# Patient Record
Sex: FEMALE | Race: WHITE | NOT HISPANIC OR LATINO | Employment: OTHER | RURAL
[De-identification: names, ages, dates, MRNs, and addresses within clinical notes are randomized per-mention and may not be internally consistent; named-entity substitution may affect disease eponyms.]

---

## 2017-03-21 ENCOUNTER — OFFICE VISIT (OUTPATIENT)
Dept: FAMILY MEDICINE CLINIC | Facility: CLINIC | Age: 77
End: 2017-03-21

## 2017-03-21 VITALS
TEMPERATURE: 97.9 F | WEIGHT: 185.2 LBS | DIASTOLIC BLOOD PRESSURE: 78 MMHG | BODY MASS INDEX: 31.62 KG/M2 | SYSTOLIC BLOOD PRESSURE: 130 MMHG | HEIGHT: 64 IN

## 2017-03-21 DIAGNOSIS — Z12.12 SCREENING FOR MALIGNANT NEOPLASM OF THE RECTUM: ICD-10-CM

## 2017-03-21 DIAGNOSIS — Z00.00 ANNUAL PHYSICAL EXAM: ICD-10-CM

## 2017-03-21 DIAGNOSIS — Z12.31 ENCOUNTER FOR SCREENING MAMMOGRAM FOR MALIGNANT NEOPLASM OF BREAST: ICD-10-CM

## 2017-03-21 DIAGNOSIS — Z12.4 SCREENING FOR MALIGNANT NEOPLASM OF CERVIX: ICD-10-CM

## 2017-03-21 DIAGNOSIS — R53.83 FATIGUE, UNSPECIFIED TYPE: ICD-10-CM

## 2017-03-21 DIAGNOSIS — E55.9 UNSPECIFIED VITAMIN D DEFICIENCY: ICD-10-CM

## 2017-03-21 DIAGNOSIS — E78.2 MIXED HYPERLIPIDEMIA: Primary | ICD-10-CM

## 2017-03-21 PROBLEM — K21.9 GERD (GASTROESOPHAGEAL REFLUX DISEASE): Status: ACTIVE | Noted: 2017-03-21

## 2017-03-21 PROBLEM — F41.9 ANXIETY: Status: ACTIVE | Noted: 2017-03-21

## 2017-03-21 PROBLEM — I10 ESSENTIAL HYPERTENSION: Status: ACTIVE | Noted: 2017-03-21

## 2017-03-21 LAB
BILIRUB BLD-MCNC: NEGATIVE MG/DL
CLARITY, POC: CLEAR
COLOR UR: YELLOW
EXPIRATION DATE 2: NORMAL
EXPIRATION DATE 3: NORMAL
EXPIRATION DATE: NORMAL
GASTROCULT GAST QL: NORMAL
GLUCOSE UR STRIP-MCNC: NEGATIVE MG/DL
HEMOCCULT SP2 STL QL: NORMAL
HEMOCCULT SP3 STL QL: NORMAL
KETONES UR QL: NEGATIVE
LEUKOCYTE EST, POC: ABNORMAL
Lab: NORMAL
NITRITE UR-MCNC: NEGATIVE MG/ML
PH UR: 7 [PH] (ref 5–8)
PROT UR STRIP-MCNC: NEGATIVE MG/DL
RBC # UR STRIP: NEGATIVE /UL
SP GR UR: 1.02 (ref 1–1.03)
UROBILINOGEN UR QL: NORMAL

## 2017-03-21 PROCEDURE — 81003 URINALYSIS AUTO W/O SCOPE: CPT | Performed by: FAMILY MEDICINE

## 2017-03-21 PROCEDURE — 82270 OCCULT BLOOD FECES: CPT | Performed by: FAMILY MEDICINE

## 2017-03-21 PROCEDURE — G0439 PPPS, SUBSEQ VISIT: HCPCS | Performed by: FAMILY MEDICINE

## 2017-03-21 RX ORDER — MULTIPLE VITAMINS W/ MINERALS TAB 9MG-400MCG
1 TAB ORAL DAILY
COMMUNITY

## 2017-03-21 RX ORDER — CITALOPRAM 20 MG/1
20 TABLET ORAL DAILY
COMMUNITY
Start: 2017-01-10 | End: 2017-03-22 | Stop reason: SDUPTHER

## 2017-03-21 RX ORDER — INDAPAMIDE 2.5 MG/1
2.5 TABLET, FILM COATED ORAL
COMMUNITY
Start: 2016-12-29 | End: 2017-03-22 | Stop reason: SDUPTHER

## 2017-03-21 RX ORDER — LISINOPRIL 10 MG/1
TABLET ORAL
COMMUNITY
Start: 2017-01-10 | End: 2017-03-22 | Stop reason: SDUPTHER

## 2017-03-21 RX ORDER — SIMVASTATIN 20 MG
20 TABLET ORAL NIGHTLY
COMMUNITY
End: 2017-03-22 | Stop reason: SDUPTHER

## 2017-03-21 RX ORDER — RANITIDINE 300 MG/1
TABLET ORAL
COMMUNITY
Start: 2016-12-29 | End: 2017-03-22 | Stop reason: SDUPTHER

## 2017-03-21 RX ORDER — ASPIRIN 81 MG/1
81 TABLET ORAL DAILY
COMMUNITY
End: 2018-09-25

## 2017-03-21 NOTE — PATIENT INSTRUCTIONS
Exercising to Stay Healthy  Exercising regularly is important. It has many health benefits, such as:  · Improving your overall fitness, flexibility, and endurance.  · Increasing your bone density.  · Helping with weight control.  · Decreasing your body fat.  · Increasing your muscle strength.  · Reducing stress and tension.  · Improving your overall health.  In order to become healthy and stay healthy, it is recommended that you do moderate-intensity and vigorous-intensity exercise. You can tell that you are exercising at a moderate intensity if you have a higher heart rate and faster breathing, but you are still able to hold a conversation. You can tell that you are exercising at a vigorous intensity if you are breathing much harder and faster and cannot hold a conversation while exercising.  HOW OFTEN SHOULD I EXERCISE?  Choose an activity that you enjoy and set realistic goals. Your health care provider can help you to make an activity plan that works for you. Exercise regularly as directed by your health care provider. This may include:   · Doing resistance training twice each week, such as:    Push-ups.    Sit-ups.    Lifting weights.    Using resistance bands.  · Doing a given intensity of exercise for a given amount of time. Choose from these options:    150 minutes of moderate-intensity exercise every week.    75 minutes of vigorous-intensity exercise every week.    A mix of moderate-intensity and vigorous-intensity exercise every week.  Children, pregnant women, people who are out of shape, people who are overweight, and older adults may need to consult a health care provider for individual recommendations. If you have any sort of medical condition, be sure to consult your health care provider before starting a new exercise program.   WHAT ARE SOME EXERCISE IDEAS?  Some moderate-intensity exercise ideas include:   · Walking at a rate of 1 mile in 15  minutes.  · Biking.  · Hiking.  · Golfing.  · Dancing.  Some vigorous-intensity exercise ideas include:   · Walking at a rate of at least 4.5 miles per hour.  · Jogging or running at a rate of 5 miles per hour.  · Biking at a rate of at least 10 miles per hour.  · Lap swimming.  · Roller-skating or in-line skating.  · Cross-country skiing.  · Vigorous competitive sports, such as football, basketball, and soccer.  · Jumping rope.  · Aerobic dancing.  WHAT ARE SOME EVERYDAY ACTIVITIES THAT CAN HELP ME TO GET EXERCISE?  · Yard work, such as:    Pushing a .    Raking and bagging leaves.  · Washing and waxing your car.  · Pushing a stroller.  · Shoveling snow.  · Gardening.  · Washing windows or floors.  HOW CAN I BE MORE ACTIVE IN MY DAY-TO-DAY ACTIVITIES?  · Use the stairs instead of the elevator.  · Take a walk during your lunch break.  · If you drive, park your car farther away from work or school.  · If you take public transportation, get off one stop early and walk the rest of the way.  · Make all of your phone calls while standing up and walking around.  · Get up, stretch, and walk around every 30 minutes throughout the day.  WHAT GUIDELINES SHOULD I FOLLOW WHILE EXERCISING?  · Do not exercise so much that you hurt yourself, feel dizzy, or get very short of breath.  · Consult your health care provider before starting a new exercise program.  · Wear comfortable clothes and shoes with good support.  · Drink plenty of water while you exercise to prevent dehydration or heat stroke. Body water is lost during exercise and must be replaced.  · Work out until you breathe faster and your heart beats faster.     This information is not intended to replace advice given to you by your health care provider. Make sure you discuss any questions you have with your health care provider.     Document Released: 01/20/2012 Document Revised: 01/08/2016 Document Reviewed: 05/21/2015  [employer] Patient Education  © [employer]    Medicare Wellness  Personal Prevention Plan of Service     Date of Office Visit:  2017  Encounter Provider:  Titi Cotton MD  Place of Service:  [facility]  Patient Name: Hailey Dickerson  :  1940    As part of the Medicare Wellness portion of your visit today, we are providing you with this personalized preventive plan of services (PPPS). This plan is based upon recommendations of the [place] Preventive Services Task Force (USPSTF) and the Advisory Committee on Immunization Practices (ACIP).    This lists the preventive care services that should be considered, and provides dates of when you are due. Items listed as completed are up-to-date and do not require any further intervention.    Health Maintenance   Topic Date Due   • TDAP/TD VACCINES (1 - Tdap) 1958   • PNEUMOCOCCAL VACCINES (65+ LOW/MEDIUM RISK) (1 of 2 - PCV13) 2004   • LIPID PANEL  2017   • ZOSTER VACCINE  2017   • MEDICARE ANNUAL WELLNESS  2017   • INFLUENZA VACCINE  Completed

## 2017-03-21 NOTE — PROGRESS NOTES
QUICK REFERENCE INFORMATION:  The ABCs of the Annual Wellness Visit    Subsequent Medicare Wellness Visit    HEALTH RISK ASSESSMENT    1940    Recent Hospitalizations:  No recent hospitalization(s)..        Current Medical Providers:  Patient Care Team:  Titi Cotton MD as PCP - General (Family Medicine)        Smoking Status:  History   Smoking Status   • Never Smoker   Smokeless Tobacco   • Never Used       Alcohol Consumption:  History   Alcohol Use No       Depression Screen:   PHQ-9 Depression Screening 3/21/2017   Little interest or pleasure in doing things 0   Feeling down, depressed, or hopeless 0   Trouble falling or staying asleep, or sleeping too much 0   Feeling tired or having little energy 0   Poor appetite or overeating 0   Feeling bad about yourself - or that you are a failure or have let yourself or your family down 0   Trouble concentrating on things, such as reading the newspaper or watching television 0   Moving or speaking so slowly that other people could have noticed. Or the opposite - being so fidgety or restless that you have been moving around a lot more than usual 0   Thoughts that you would be better off dead, or of hurting yourself in some way 0   PHQ-9 Total Score 0   If you checked off any problems, how difficult have these problems made it for you to do your work, take care of things at home, or get along with other people? Not difficult at all       Health Habits and Functional and Cognitive Screening:  Functional & Cognitive Status 3/21/2017   Do you have difficulty preparing food and eating? No   Do you have difficulty bathing yourself? No   Do you have difficulty getting dressed? No   Do you have difficulty using the toilet? No   Do you have difficulty moving around from place to place? No   In the past year have you fallen or experienced a near fall? No   Do you need help using the phone?  No   Are you deaf or do you have serious difficulty hearing?  No   Do you  "need help with transportation? No   Do you need help shopping? No   Do you need help preparing meals?  No   Do you need help with housework?  No   Do you need help with laundry? No   Do you need help taking your medications? No   Do you need help managing money? No   Do you have difficulty concentrating, remembering or making decisions? No   -- The Timed Up and Go (TUG) Test (CDC Version)                  - Testing directions adhered to:                                  1) Patients wears regular footwear and may use walking aid(s) if    needed.                                  2) Patient to sit back in standard arm chair and identify a line 10 feet    away from patient on floor.                                  3) Test time begins at \"Go\" and stops when patient reseated in arm    chair.                    - Instructions read aloud (and demonstrated as applicable) to patient:                                      When I say \"Go,\" I want you to:                                    1) Stand up from the chair.                                  2) Walk to the line on the floor (10 feet away) at your normal pace.                                  3) Turn.                                  4) Walk back to the chair at your normal pace.                                  5) Sit down again.                    - Result:                     - Observations during test:Slow tentative gait                Does the patient have evidence of cognitive impairment? No    Asprin use counseling:yes      Recent Lab Results:  CMP:     Lipid Panel:     LDL:     HbA1c:     Urine Microalbumin:     Visual Acuity:  No exam data present    Age-appropriate Screening Schedule:  Refer to the list below for future screening recommendations based on patient's age, sex and/or medical conditions. Orders for these recommended tests are listed in the plan section. The patient has been provided with a written plan.    Health Maintenance   Topic Date Due   • " TDAP/TD VACCINES (1 - Tdap) 06/17/1958   • PNEUMOCOCCAL VACCINES (65+ LOW/MEDIUM RISK) (1 of 2 - PCV13) 06/17/2004   • LIPID PANEL  03/21/2017   • ZOSTER VACCINE  03/21/2017   • INFLUENZA VACCINE  Completed        Subjective   History of Present Illness    Hailey Dickerson is a 76 y.o. female who presents for an Subsequent Wellness Visit.    The following portions of the patient's history were reviewed and updated as appropriate: allergies, current medications, past family history, past medical history, past surgical history and problem list.    No outpatient prescriptions prior to visit.     No facility-administered medications prior to visit.        Patient Active Problem List   Diagnosis   • Essential hypertension   • Anxiety   • GERD (gastroesophageal reflux disease)       Advanced Care Planning:  has an advanced directive - a copy HAS NOT been provided    Identification of Risk Factors:  Risk factors include: weight , unhealthy diet and cardiovascular risk.    Review of Systems   Constitutional: Negative.    HENT: Negative.    Eyes: Negative.    Respiratory: Negative.    Cardiovascular:        [facility] cardiologist regularly   Gastrointestinal:        Up-to-date on colonoscopy   Endocrine: Negative.    Genitourinary:        [facility] urologist regularly   Musculoskeletal: Negative.    Allergic/Immunologic: Negative.    Neurological: Negative.    Hematological: Negative.    Psychiatric/Behavioral: Negative.        Compared to one year ago, the patient feels her physical health is the same.  Compared to one year ago, the patient feels her mental health is the same.    Objective     Physical Exam   Constitutional: She is oriented to person, place, and time. She appears well-developed and well-nourished.   HENT:   Head: Normocephalic.   Right Ear: External ear normal.   Left Ear: External ear normal.   Mouth/Throat: Oropharynx is clear and moist.   Eyes: Conjunctivae and EOM are normal. Pupils are equal, round, and reactive to  "light.   Neck: Normal range of motion. No thyromegaly present.   Cardiovascular: Normal rate and regular rhythm.    No murmur heard.  Pulmonary/Chest: Effort normal and breath sounds normal. She has no wheezes. She has no rales.   Abdominal: Soft. Bowel sounds are normal.   No pulsatile masses   Genitourinary: Vagina normal and uterus normal.   Genitourinary Comments: Breasts no masses noted-pelvic atrophic vaginitis Pap smear taken bimanual normal with no adnexal masses   Musculoskeletal: Normal range of motion.   Lymphadenopathy:     She has no cervical adenopathy.   Neurological: She is alert and oriented to person, place, and time.   Skin: Skin is warm and dry.   Psychiatric: She has a normal mood and affect. Her behavior is normal. Judgment and thought content normal.   Vitals reviewed.      Vitals:    03/21/17 1020   BP: 130/78   Temp: 97.9 °F (36.6 °C)   Weight: 185 lb 3.2 oz (84 kg)   Height: 64\" (162.6 cm)   PainSc: 0-No pain       Body mass index is 31.79 kg/(m^2).  Discussed the patient's BMI with her. The BMI is above average; no BMI management plan is appropriate..    Assessment/Plan   Patient Self-Management and Personalized Health Advice  The patient has been provided with information about: diet, exercise, weight management, prevention of cardiac or vascular disease and the relationship between weight and GERD and preventive services including:   · Colorectal cancer screening, fecal occult blood test, Diabetes screening, see lab orders, Exercise counseling provided, Screening Pap smear and pelvic exam .    Visit Diagnoses:    ICD-10-CM ICD-9-CM   1. Mixed hyperlipidemia E78.2 272.2   2. Fatigue, unspecified type R53.83 780.79   3. Unspecified vitamin D deficiency E55.9 268.9   4. Screening for malignant neoplasm of the rectum Z12.12 V76.41   5. Encounter for screening mammogram for malignant neoplasm of breast Z12.31 V76.12       Orders Placed This Encounter   Procedures   • Mammo Screening Bilateral " With CAD     Standing Status:   Future     Standing Expiration Date:   3/21/2018     Order Specific Question:   Reason for Exam:     Answer:   screening   • TSH   • T4, free   • Comprehensive Metabolic Panel   • Lipid Panel   • Vitamin D 25 Hydroxy   • POC Occult Blood X 3, Stool       Outpatient Encounter Prescriptions as of 3/21/2017   Medication Sig Dispense Refill   • aspirin 81 MG EC tablet Take 81 mg by mouth Daily.     • citalopram (CeleXA) 20 MG tablet 20 mg Daily.     • Cranberry 125 MG tablet Take  by mouth Daily.     • indapamide (LOZOL) 2.5 MG tablet 2.5 mg. Take by mouth on Monday, Wednesday, and Friday     • lisinopril (PRINIVIL,ZESTRIL) 10 MG tablet      • Multiple Vitamins-Minerals (MULTIVITAMIN WITH MINERALS) tablet tablet Take 1 tablet by mouth Daily.     • raNITIdine (ZANTAC) 300 MG tablet      • simvastatin (ZOCOR) 20 MG tablet Take 20 mg by mouth Every Night.       No facility-administered encounter medications on file as of 3/21/2017.        Reviewed use of high risk medication in the elderly: yes  Reviewed for potential of harmful drug interactions in the elderly: yes    Follow Up:  Return in 6 months (on 9/21/2017).     An After Visit Summary and PPPS with all of these plans were given to the patient.       plan continue blood pressure cholesterol control and exercise pattern-brain health discussed

## 2017-03-22 LAB
25(OH)D3+25(OH)D2 SERPL-MCNC: 42.3 NG/ML (ref 30–100)
ALBUMIN SERPL-MCNC: 4.4 G/DL (ref 3.5–5)
ALBUMIN/GLOB SERPL: 1.6 G/DL (ref 1.1–2.5)
ALP SERPL-CCNC: 67 U/L (ref 24–120)
ALT SERPL-CCNC: 24 U/L (ref 0–54)
AST SERPL-CCNC: 24 U/L (ref 7–45)
BASOPHILS # BLD AUTO: 0.04 10*3/MM3 (ref 0–0.2)
BASOPHILS NFR BLD AUTO: 0.6 % (ref 0–2)
BILIRUB SERPL-MCNC: 0.5 MG/DL (ref 0.1–1)
BUN SERPL-MCNC: 19 MG/DL (ref 5–21)
BUN/CREAT SERPL: 20.4 (ref 7–25)
CALCIUM SERPL-MCNC: 9.8 MG/DL (ref 8.4–10.4)
CHLORIDE SERPL-SCNC: 101 MMOL/L (ref 98–110)
CHOLEST SERPL-MCNC: 189 MG/DL (ref 130–200)
CO2 SERPL-SCNC: 30 MMOL/L (ref 24–31)
CREAT SERPL-MCNC: 0.93 MG/DL (ref 0.5–1.4)
CYTOLOGIST CVX/VAG CYTO: NORMAL
CYTOLOGY CVX/VAG DOC THIN PREP: NORMAL
DX ICD CODE: NORMAL
EOSINOPHIL # BLD AUTO: 0.04 10*3/MM3 (ref 0–0.7)
EOSINOPHIL NFR BLD AUTO: 0.6 % (ref 0–4)
ERYTHROCYTE [DISTWIDTH] IN BLOOD BY AUTOMATED COUNT: 12.8 % (ref 12–15)
GLOBULIN SER CALC-MCNC: 2.8 GM/DL
GLUCOSE SERPL-MCNC: 91 MG/DL (ref 70–100)
HCT VFR BLD AUTO: 43.1 % (ref 37–47)
HDLC SERPL-MCNC: 56 MG/DL
HGB BLD-MCNC: 14.5 G/DL (ref 12–16)
HIV 1 & 2 AB SER-IMP: NORMAL
IMM GRANULOCYTES # BLD: 0.01 10*3/MM3 (ref 0–0.03)
IMM GRANULOCYTES NFR BLD: 0.1 % (ref 0–5)
LDLC SERPL CALC-MCNC: 103 MG/DL (ref 0–99)
LYMPHOCYTES # BLD AUTO: 2.14 10*3/MM3 (ref 0.72–4.86)
LYMPHOCYTES NFR BLD AUTO: 31.1 % (ref 15–45)
MCH RBC QN AUTO: 33 PG (ref 28–32)
MCHC RBC AUTO-ENTMCNC: 33.6 G/DL (ref 33–36)
MCV RBC AUTO: 98 FL (ref 82–98)
MONOCYTES # BLD AUTO: 0.65 10*3/MM3 (ref 0.19–1.3)
MONOCYTES NFR BLD AUTO: 9.4 % (ref 4–12)
NEUTROPHILS # BLD AUTO: 4.01 10*3/MM3 (ref 1.87–8.4)
NEUTROPHILS NFR BLD AUTO: 58.2 % (ref 39–78)
OTHER STN SPEC: NORMAL
PATH REPORT.FINAL DX SPEC: NORMAL
PLATELET # BLD AUTO: 304 10*3/MM3 (ref 130–400)
POTASSIUM SERPL-SCNC: 4.5 MMOL/L (ref 3.5–5.3)
PROT SERPL-MCNC: 7.2 G/DL (ref 6.3–8.7)
RBC # BLD AUTO: 4.4 10*6/MM3 (ref 4.2–5.4)
SODIUM SERPL-SCNC: 140 MMOL/L (ref 135–145)
STAT OF ADQ CVX/VAG CYTO-IMP: NORMAL
T4 FREE SERPL-MCNC: 1.12 NG/DL (ref 0.78–2.19)
TRIGL SERPL-MCNC: 152 MG/DL (ref 0–149)
TSH SERPL DL<=0.005 MIU/L-ACNC: 1.6 MIU/ML (ref 0.47–4.68)
VLDLC SERPL CALC-MCNC: 30.4 MG/DL
WBC # BLD AUTO: 6.89 10*3/MM3 (ref 4.8–10.8)

## 2017-03-22 RX ORDER — CITALOPRAM 20 MG/1
20 TABLET ORAL DAILY
Qty: 90 TABLET | Refills: 1 | Status: SHIPPED | OUTPATIENT
Start: 2017-03-22 | End: 2018-01-29 | Stop reason: SDUPTHER

## 2017-03-22 RX ORDER — LISINOPRIL 10 MG/1
10 TABLET ORAL DAILY
Qty: 90 TABLET | Refills: 1 | Status: SHIPPED | OUTPATIENT
Start: 2017-03-22 | End: 2018-03-14 | Stop reason: SDUPTHER

## 2017-03-22 RX ORDER — INDAPAMIDE 2.5 MG/1
2.5 TABLET, FILM COATED ORAL DAILY
Qty: 90 TABLET | Refills: 1 | Status: SHIPPED | OUTPATIENT
Start: 2017-03-22 | End: 2018-03-22 | Stop reason: SDUPTHER

## 2017-03-22 RX ORDER — SIMVASTATIN 20 MG
20 TABLET ORAL NIGHTLY
Qty: 90 TABLET | Refills: 1 | Status: SHIPPED | OUTPATIENT
Start: 2017-03-22 | End: 2017-10-06 | Stop reason: SDUPTHER

## 2017-03-22 RX ORDER — RANITIDINE 300 MG/1
300 TABLET ORAL NIGHTLY
Qty: 90 TABLET | Refills: 1 | Status: SHIPPED | OUTPATIENT
Start: 2017-03-22 | End: 2018-03-22 | Stop reason: SDUPTHER

## 2017-03-23 LAB
BACTERIA UR CULT: ABNORMAL
BACTERIA UR CULT: ABNORMAL
OTHER ANTIBIOTIC SUSC ISLT: ABNORMAL

## 2017-03-24 RX ORDER — CEFDINIR 300 MG/1
300 CAPSULE ORAL 2 TIMES DAILY
Qty: 14 CAPSULE | Refills: 0 | Status: SHIPPED | OUTPATIENT
Start: 2017-03-24 | End: 2017-07-25

## 2017-03-24 NOTE — TELEPHONE ENCOUNTER
----- Message from Titi Cotton MD sent at 3/24/2017  6:57 AM CDT -----  Lab good except urinary tract infection–Omnicef 300 twice a day 7 days–Tell her we are sending in a copy Dr. Wellington

## 2017-05-19 ENCOUNTER — OFFICE VISIT (OUTPATIENT)
Dept: FAMILY MEDICINE CLINIC | Facility: CLINIC | Age: 77
End: 2017-05-19

## 2017-05-19 VITALS
HEIGHT: 64 IN | HEART RATE: 68 BPM | DIASTOLIC BLOOD PRESSURE: 68 MMHG | BODY MASS INDEX: 32.61 KG/M2 | TEMPERATURE: 98.3 F | OXYGEN SATURATION: 98 % | WEIGHT: 191 LBS | SYSTOLIC BLOOD PRESSURE: 128 MMHG

## 2017-05-19 DIAGNOSIS — M54.2 NECK PAIN: Primary | ICD-10-CM

## 2017-05-19 PROCEDURE — 99213 OFFICE O/P EST LOW 20 MIN: CPT | Performed by: FAMILY MEDICINE

## 2017-05-19 PROCEDURE — 96372 THER/PROPH/DIAG INJ SC/IM: CPT | Performed by: FAMILY MEDICINE

## 2017-05-19 RX ORDER — PREDNISONE 20 MG/1
TABLET ORAL
Qty: 12 TABLET | Refills: 0 | Status: SHIPPED | OUTPATIENT
Start: 2017-05-19 | End: 2017-07-25

## 2017-05-19 RX ORDER — METHYLPREDNISOLONE ACETATE 40 MG/ML
40 INJECTION, SUSPENSION INTRA-ARTICULAR; INTRALESIONAL; INTRAMUSCULAR; SOFT TISSUE ONCE
Status: COMPLETED | OUTPATIENT
Start: 2017-05-19 | End: 2017-05-19

## 2017-05-19 RX ADMIN — METHYLPREDNISOLONE ACETATE 40 MG: 40 INJECTION, SUSPENSION INTRA-ARTICULAR; INTRALESIONAL; INTRAMUSCULAR; SOFT TISSUE at 12:53

## 2017-07-25 ENCOUNTER — OFFICE VISIT (OUTPATIENT)
Dept: FAMILY MEDICINE CLINIC | Facility: CLINIC | Age: 77
End: 2017-07-25

## 2017-07-25 VITALS
DIASTOLIC BLOOD PRESSURE: 70 MMHG | HEIGHT: 64 IN | TEMPERATURE: 98 F | SYSTOLIC BLOOD PRESSURE: 122 MMHG | OXYGEN SATURATION: 98 % | WEIGHT: 193 LBS | BODY MASS INDEX: 32.95 KG/M2 | HEART RATE: 79 BPM

## 2017-07-25 DIAGNOSIS — R52 PAIN: Primary | ICD-10-CM

## 2017-07-25 DIAGNOSIS — M25.562 LEFT KNEE PAIN, UNSPECIFIED CHRONICITY: ICD-10-CM

## 2017-07-25 DIAGNOSIS — M25.562 ACUTE PAIN OF LEFT KNEE: Primary | ICD-10-CM

## 2017-07-25 PROCEDURE — 99213 OFFICE O/P EST LOW 20 MIN: CPT | Performed by: FAMILY MEDICINE

## 2017-07-25 PROCEDURE — 96372 THER/PROPH/DIAG INJ SC/IM: CPT | Performed by: FAMILY MEDICINE

## 2017-07-25 RX ORDER — PREDNISONE 20 MG/1
TABLET ORAL
Qty: 15 TABLET | Refills: 0 | Status: SHIPPED | OUTPATIENT
Start: 2017-07-25 | End: 2017-12-12

## 2017-07-25 RX ORDER — METHYLPREDNISOLONE ACETATE 40 MG/ML
40 INJECTION, SUSPENSION INTRA-ARTICULAR; INTRALESIONAL; INTRAMUSCULAR; SOFT TISSUE ONCE
Status: COMPLETED | OUTPATIENT
Start: 2017-07-25 | End: 2017-07-25

## 2017-07-25 RX ADMIN — METHYLPREDNISOLONE ACETATE 40 MG: 40 INJECTION, SUSPENSION INTRA-ARTICULAR; INTRALESIONAL; INTRAMUSCULAR; SOFT TISSUE at 14:53

## 2017-07-25 NOTE — PROGRESS NOTES
Subjective   Hailey Dickerson is a 77 y.o. female.     Knee Pain    The incident occurred 3 to 5 days ago. The incident occurred at home. There was no injury mechanism. The pain is present in the left knee. The quality of the pain is described as aching. The pain is at a severity of 3/10. The pain is mild. The pain has been constant since onset. Associated symptoms include an inability to bear weight and a loss of motion. She reports no foreign bodies present. The symptoms are aggravated by weight bearing. She has tried nothing for the symptoms.       The following portions of the patient's history were reviewed and updated as appropriate: allergies, current medications, past family history, past medical history, past social history, past surgical history and problem list.    Review of Systems   Musculoskeletal:        Left knee pain-on ibuprofen-probable effusion       Objective   Physical Exam   Constitutional: She is oriented to person, place, and time.   Morbidly obese   Musculoskeletal:   Left knee-warm swollen probable effusion   Neurological: She is alert and oriented to person, place, and time.   Skin: Skin is warm.   Psychiatric: She has a normal mood and affect. Her behavior is normal.   Vitals reviewed.      Assessment/Plan   Hailey was seen today for knee pain.    Diagnoses and all orders for this visit:    Pain  -     methylPREDNISolone acetate (DEPO-medrol) injection 40 mg; Inject 1 mL into the shoulder, thigh, or buttocks 1 (One) Time.    Other orders  -     predniSONE (DELTASONE) 20 MG tablet; Take 2 pills on Wednesday and Thursday morning then 1 pill till medicine gone       Plan failed attempt at aspiration after alcohol prep-above and sent her for x-ray

## 2017-10-06 RX ORDER — SIMVASTATIN 20 MG
TABLET ORAL
Qty: 90 TABLET | Refills: 1 | Status: SHIPPED | OUTPATIENT
Start: 2017-10-06 | End: 2018-03-22 | Stop reason: SDUPTHER

## 2017-12-12 ENCOUNTER — OFFICE VISIT (OUTPATIENT)
Dept: FAMILY MEDICINE CLINIC | Facility: CLINIC | Age: 77
End: 2017-12-12

## 2017-12-12 VITALS
DIASTOLIC BLOOD PRESSURE: 70 MMHG | SYSTOLIC BLOOD PRESSURE: 118 MMHG | HEART RATE: 72 BPM | BODY MASS INDEX: 32.54 KG/M2 | OXYGEN SATURATION: 99 % | WEIGHT: 190.6 LBS | TEMPERATURE: 98 F | HEIGHT: 64 IN

## 2017-12-12 DIAGNOSIS — J06.9 ACUTE URI: Primary | ICD-10-CM

## 2017-12-12 PROCEDURE — 99213 OFFICE O/P EST LOW 20 MIN: CPT | Performed by: FAMILY MEDICINE

## 2017-12-12 PROCEDURE — 96372 THER/PROPH/DIAG INJ SC/IM: CPT | Performed by: FAMILY MEDICINE

## 2017-12-12 RX ORDER — MECLIZINE HYDROCHLORIDE 25 MG/1
25 TABLET ORAL 3 TIMES DAILY
COMMUNITY
Start: 2017-12-07 | End: 2018-03-22

## 2017-12-12 RX ORDER — IBANDRONATE SODIUM 150 MG/1
TABLET, FILM COATED ORAL
COMMUNITY
Start: 2017-09-15 | End: 2018-03-22

## 2017-12-12 RX ORDER — METHYLPREDNISOLONE ACETATE 40 MG/ML
20 INJECTION, SUSPENSION INTRA-ARTICULAR; INTRALESIONAL; INTRAMUSCULAR; SOFT TISSUE ONCE
Status: COMPLETED | OUTPATIENT
Start: 2017-12-12 | End: 2017-12-12

## 2017-12-12 RX ADMIN — METHYLPREDNISOLONE ACETATE 20 MG: 40 INJECTION, SUSPENSION INTRA-ARTICULAR; INTRALESIONAL; INTRAMUSCULAR; SOFT TISSUE at 13:49

## 2017-12-12 NOTE — PROGRESS NOTES
Subjective   Hailey Dickerson is a 77 y.o. female.     URI    This is a new problem. The current episode started in the past 7 days. The problem has been gradually improving. There has been no fever. Associated symptoms include rhinorrhea, sinus pain and sneezing. Pertinent negatives include no chest pain or headaches. Associated symptoms comments: Dizziness. She has tried antihistamine for the symptoms. The treatment provided mild relief.       The following portions of the patient's history were reviewed and updated as appropriate: allergies, current medications, past family history, past medical history, past social history, past surgical history and problem list.    Review of Systems   HENT: Positive for facial swelling, rhinorrhea, sinus pain and sneezing.    Cardiovascular: Negative for chest pain.   Neurological: Negative for headaches.       Objective   Physical Exam   Constitutional: She is oriented to person, place, and time. She appears well-developed and well-nourished.   HENT:   No serous otitis seen-nasopharynx benign-swollen turbinates-no significant cervical adenopathy   Eyes: Conjunctivae are normal.   Cardiovascular: Normal rate and regular rhythm.    Pulmonary/Chest: Effort normal and breath sounds normal.   Lymphadenopathy:     She has no cervical adenopathy.   Neurological: She is alert and oriented to person, place, and time.   Psychiatric: She has a normal mood and affect. Her behavior is normal.   Vitals reviewed.      Assessment/Plan   Hailey was seen today for ear drainage.    Diagnoses and all orders for this visit:    Acute URI  -     methylPREDNISolone acetate (DEPO-medrol) injection 20 mg; Inject 0.5 mL into the shoulder, thigh, or buttocks 1 (One) Time.           Plan-above plus continue meclizine

## 2018-01-29 RX ORDER — CITALOPRAM 20 MG/1
20 TABLET ORAL DAILY
Qty: 90 TABLET | Refills: 0 | Status: SHIPPED | OUTPATIENT
Start: 2018-01-29 | End: 2018-03-22 | Stop reason: SDUPTHER

## 2018-03-14 RX ORDER — LISINOPRIL 10 MG/1
10 TABLET ORAL DAILY
Qty: 90 TABLET | Refills: 0 | Status: SHIPPED | OUTPATIENT
Start: 2018-03-14 | End: 2018-05-14 | Stop reason: SDUPTHER

## 2018-03-22 ENCOUNTER — OFFICE VISIT (OUTPATIENT)
Dept: FAMILY MEDICINE CLINIC | Facility: CLINIC | Age: 78
End: 2018-03-22

## 2018-03-22 VITALS
TEMPERATURE: 98.6 F | SYSTOLIC BLOOD PRESSURE: 112 MMHG | DIASTOLIC BLOOD PRESSURE: 64 MMHG | HEIGHT: 64 IN | OXYGEN SATURATION: 98 % | HEART RATE: 72 BPM | WEIGHT: 196 LBS | BODY MASS INDEX: 33.46 KG/M2

## 2018-03-22 DIAGNOSIS — E78.2 MIXED HYPERLIPIDEMIA: ICD-10-CM

## 2018-03-22 DIAGNOSIS — Z12.12 SCREENING FOR MALIGNANT NEOPLASM OF THE RECTUM: ICD-10-CM

## 2018-03-22 DIAGNOSIS — E55.9 VITAMIN D DEFICIENCY: ICD-10-CM

## 2018-03-22 DIAGNOSIS — N39.0 URINARY TRACT INFECTION WITHOUT HEMATURIA, SITE UNSPECIFIED: ICD-10-CM

## 2018-03-22 DIAGNOSIS — Z12.31 ENCOUNTER FOR SCREENING MAMMOGRAM FOR MALIGNANT NEOPLASM OF BREAST: ICD-10-CM

## 2018-03-22 DIAGNOSIS — R53.83 FATIGUE, UNSPECIFIED TYPE: ICD-10-CM

## 2018-03-22 DIAGNOSIS — Z00.00 PHYSICAL EXAM, ANNUAL: Primary | ICD-10-CM

## 2018-03-22 LAB
25(OH)D3+25(OH)D2 SERPL-MCNC: 37.2 NG/ML (ref 30–100)
ALBUMIN SERPL-MCNC: 4.4 G/DL (ref 3.5–5)
ALBUMIN/GLOB SERPL: 1.5 G/DL (ref 1.1–2.5)
ALP SERPL-CCNC: 55 U/L (ref 24–120)
ALT SERPL-CCNC: 29 U/L (ref 0–54)
AST SERPL-CCNC: 22 U/L (ref 7–45)
BASOPHILS # BLD AUTO: 0.05 10*3/MM3 (ref 0–0.2)
BASOPHILS NFR BLD AUTO: 0.8 % (ref 0–2)
BILIRUB BLD-MCNC: NEGATIVE MG/DL
BILIRUB SERPL-MCNC: 0.4 MG/DL (ref 0.1–1)
BUN SERPL-MCNC: 21 MG/DL (ref 5–21)
BUN/CREAT SERPL: 23.1 (ref 7–25)
CALCIUM SERPL-MCNC: 10 MG/DL (ref 8.4–10.4)
CHLORIDE SERPL-SCNC: 103 MMOL/L (ref 98–110)
CHOLEST SERPL-MCNC: 179 MG/DL (ref 130–200)
CLARITY, POC: ABNORMAL
CO2 SERPL-SCNC: 26 MMOL/L (ref 24–31)
COLOR UR: YELLOW
CREAT SERPL-MCNC: 0.91 MG/DL (ref 0.5–1.4)
EOSINOPHIL # BLD AUTO: 0.09 10*3/MM3 (ref 0–0.7)
EOSINOPHIL NFR BLD AUTO: 1.5 % (ref 0–4)
ERYTHROCYTE [DISTWIDTH] IN BLOOD BY AUTOMATED COUNT: 11.7 % (ref 12–15)
GFR SERPLBLD CREATININE-BSD FMLA CKD-EPI: 60 ML/MIN/1.73
GFR SERPLBLD CREATININE-BSD FMLA CKD-EPI: 72 ML/MIN/1.73
GLOBULIN SER CALC-MCNC: 2.9 GM/DL
GLUCOSE SERPL-MCNC: 94 MG/DL (ref 70–100)
GLUCOSE UR STRIP-MCNC: NEGATIVE MG/DL
HCT VFR BLD AUTO: 43.1 % (ref 37–47)
HDLC SERPL-MCNC: 51 MG/DL
HGB BLD-MCNC: 14.1 G/DL (ref 12–16)
IMM GRANULOCYTES # BLD: 0.02 10*3/MM3 (ref 0–0.03)
IMM GRANULOCYTES NFR BLD: 0.3 % (ref 0–5)
KETONES UR QL: NEGATIVE
LDLC SERPL CALC-MCNC: 96 MG/DL (ref 0–99)
LEUKOCYTE EST, POC: ABNORMAL
LYMPHOCYTES # BLD AUTO: 2.01 10*3/MM3 (ref 0.72–4.86)
LYMPHOCYTES NFR BLD AUTO: 33.6 % (ref 15–45)
MCH RBC QN AUTO: 32.3 PG (ref 28–32)
MCHC RBC AUTO-ENTMCNC: 32.7 G/DL (ref 33–36)
MCV RBC AUTO: 98.9 FL (ref 82–98)
MONOCYTES # BLD AUTO: 0.62 10*3/MM3 (ref 0.19–1.3)
MONOCYTES NFR BLD AUTO: 10.4 % (ref 4–12)
NEUTROPHILS # BLD AUTO: 3.19 10*3/MM3 (ref 1.87–8.4)
NEUTROPHILS NFR BLD AUTO: 53.4 % (ref 39–78)
NITRITE UR-MCNC: POSITIVE MG/ML
NRBC BLD AUTO-RTO: 0 /100 WBC (ref 0–0)
PH UR: 5.5 [PH] (ref 5–8)
PLATELET # BLD AUTO: 272 10*3/MM3 (ref 130–400)
POTASSIUM SERPL-SCNC: 4.8 MMOL/L (ref 3.5–5.3)
PROT SERPL-MCNC: 7.3 G/DL (ref 6.3–8.7)
PROT UR STRIP-MCNC: NEGATIVE MG/DL
RBC # BLD AUTO: 4.36 10*6/MM3 (ref 4.2–5.4)
RBC # UR STRIP: NEGATIVE /UL
SODIUM SERPL-SCNC: 141 MMOL/L (ref 135–145)
SP GR UR: 1.01 (ref 1–1.03)
T4 FREE SERPL-MCNC: 1.28 NG/DL (ref 0.78–2.19)
TRIGL SERPL-MCNC: 160 MG/DL (ref 0–149)
TSH SERPL DL<=0.005 MIU/L-ACNC: 2.33 MIU/ML (ref 0.47–4.68)
UROBILINOGEN UR QL: NORMAL
VLDLC SERPL CALC-MCNC: 32 MG/DL
WBC # BLD AUTO: 5.98 10*3/MM3 (ref 4.8–10.8)

## 2018-03-22 PROCEDURE — 99213 OFFICE O/P EST LOW 20 MIN: CPT | Performed by: FAMILY MEDICINE

## 2018-03-22 PROCEDURE — 81003 URINALYSIS AUTO W/O SCOPE: CPT | Performed by: FAMILY MEDICINE

## 2018-03-22 PROCEDURE — G0439 PPPS, SUBSEQ VISIT: HCPCS | Performed by: FAMILY MEDICINE

## 2018-03-22 RX ORDER — ALENDRONATE SODIUM 70 MG/1
70 TABLET ORAL
Qty: 12 TABLET | Refills: 3 | Status: SHIPPED | OUTPATIENT
Start: 2018-03-22 | End: 2022-08-19 | Stop reason: SDUPTHER

## 2018-03-22 RX ORDER — ALENDRONATE SODIUM 70 MG/1
70 TABLET ORAL
COMMUNITY
End: 2018-03-22 | Stop reason: SDUPTHER

## 2018-03-22 RX ORDER — CITALOPRAM 20 MG/1
20 TABLET ORAL DAILY
Qty: 90 TABLET | Refills: 3 | Status: SHIPPED | OUTPATIENT
Start: 2018-03-22 | End: 2019-04-15 | Stop reason: SDUPTHER

## 2018-03-22 RX ORDER — RANITIDINE 300 MG/1
300 TABLET ORAL NIGHTLY
Qty: 90 TABLET | Refills: 3 | Status: SHIPPED | OUTPATIENT
Start: 2018-03-22 | End: 2019-04-29 | Stop reason: SDUPTHER

## 2018-03-22 RX ORDER — SIMVASTATIN 20 MG
20 TABLET ORAL EVERY EVENING
Qty: 90 TABLET | Refills: 3 | Status: SHIPPED | OUTPATIENT
Start: 2018-03-22 | End: 2019-06-23 | Stop reason: SDUPTHER

## 2018-03-22 RX ORDER — INDAPAMIDE 2.5 MG/1
TABLET, FILM COATED ORAL
Qty: 12 TABLET | Refills: 3 | Status: SHIPPED | OUTPATIENT
Start: 2018-03-22 | End: 2019-08-07 | Stop reason: SDUPTHER

## 2018-03-22 NOTE — PATIENT INSTRUCTIONS
Exercising to Stay Healthy  Exercising regularly is important. It has many health benefits, such as:  · Improving your overall fitness, flexibility, and endurance.  · Increasing your bone density.  · Helping with weight control.  · Decreasing your body fat.  · Increasing your muscle strength.  · Reducing stress and tension.  · Improving your overall health.  In order to become healthy and stay healthy, it is recommended that you do moderate-intensity and vigorous-intensity exercise. You can tell that you are exercising at a moderate intensity if you have a higher heart rate and faster breathing, but you are still able to hold a conversation. You can tell that you are exercising at a vigorous intensity if you are breathing much harder and faster and cannot hold a conversation while exercising.  How often should I exercise?  Choose an activity that you enjoy and set realistic goals. Your health care provider can help you to make an activity plan that works for you. Exercise regularly as directed by your health care provider. This may include:  · Doing resistance training twice each week, such as:  ¨ Push-ups.  ¨ Sit-ups.  ¨ Lifting weights.  ¨ Using resistance bands.  · Doing a given intensity of exercise for a given amount of time. Choose from these options:  ¨ 150 minutes of moderate-intensity exercise every week.  ¨ 75 minutes of vigorous-intensity exercise every week.  ¨ A mix of moderate-intensity and vigorous-intensity exercise every week.  Children, pregnant women, people who are out of shape, people who are overweight, and older adults may need to consult a health care provider for individual recommendations. If you have any sort of medical condition, be sure to consult your health care provider before starting a new exercise program.  What are some exercise ideas?  Some moderate-intensity exercise ideas include:  · Walking at a rate of 1 mile in 15  minutes.  · Biking.  · Hiking.  · Golfing.  · Dancing.  Some vigorous-intensity exercise ideas include:  · Walking at a rate of at least 4.5 miles per hour.  · Jogging or running at a rate of 5 miles per hour.  · Biking at a rate of at least 10 miles per hour.  · Lap swimming.  · Roller-skating or in-line skating.  · Cross-country skiing.  · Vigorous competitive sports, such as football, basketball, and soccer.  · Jumping rope.  · Aerobic dancing.  What are some everyday activities that can help me to get exercise?  · Yard work, such as:  ¨ Pushing a .  ¨ Raking and bagging leaves.  · Washing and waxing your car.  · Pushing a stroller.  · Shoveling snow.  · Gardening.  · Washing windows or floors.  How can I be more active in my day-to-day activities?  · Use the stairs instead of the elevator.  · Take a walk during your lunch break.  · If you drive, park your car farther away from work or school.  · If you take public transportation, get off one stop early and walk the rest of the way.  · Make all of your phone calls while standing up and walking around.  · Get up, stretch, and walk around every 30 minutes throughout the day.  What guidelines should I follow while exercising?  · Do not exercise so much that you hurt yourself, feel dizzy, or get very short of breath.  · Consult your health care provider before starting a new exercise program.  · Wear comfortable clothes and shoes with good support.  · Drink plenty of water while you exercise to prevent dehydration or heat stroke. Body water is lost during exercise and must be replaced.  · Work out until you breathe faster and your heart beats faster.  This information is not intended to replace advice given to you by your health care provider. Make sure you discuss any questions you have with your health care provider.  Document Released: 01/20/2012 Document Revised: 05/25/2017 Document Reviewed: 05/21/2015  [employer] Interactive Patient Education © 2017  [employer]    Fall Prevention in the Home  Falls can cause injuries and can affect people from all age groups. There are many simple things that you can do to make your home safe and to help prevent falls.  What can I do on the outside of my home?  · Regularly repair the edges of walkways and driveways and fix any cracks.  · Remove high doorway thresholds.  · Trim any shrubbery on the main path into your home.  · Use bright outdoor lighting.  · Clear walkways of debris and clutter, including tools and rocks.  · Regularly check that handrails are securely fastened and in good repair. Both sides of any steps should have handrails.  · Install guardrails along the edges of any raised decks or porches.  · Have leaves, snow, and ice cleared regularly.  · Use sand or salt on walkways during winter months.  · In the garage, clean up any spills right away, including grease or oil spills.  What can I do in the bathroom?  · Use night lights.  · Install grab bars by the toilet and in the tub and shower. Do not use towel bars as grab bars.  · Use non-skid mats or decals on the floor of the tub or shower.  · If you need to sit down while you are in the shower, use a plastic, non-slip stool.  · Keep the floor dry. Immediately clean up any water that spills on the floor.  · Remove soap buildup in the tub or shower on a regular basis.  · Attach bath mats securely with double-sided non-slip rug tape.  · Remove throw rugs and other tripping hazards from the floor.  What can I do in the bedroom?  · Use night lights.  · Make sure that a bedside light is easy to reach.  · Do not use oversized bedding that drapes onto the floor.  · Have a firm chair that has side arms to use for getting dressed.  · Remove throw rugs and other tripping hazards from the floor.  What can I do in the kitchen?  · Clean up any spills right away.  · Avoid walking on wet floors.  · Place frequently used items in easy-to-reach places.  · If you need to reach  for something above you, use a sturdy step stool that has a grab bar.  · Keep electrical cables out of the way.  · Do not use floor polish or wax that makes floors slippery. If you have to use wax, make sure that it is non-skid floor wax.  · Remove throw rugs and other tripping hazards from the floor.  What can I do in the stairways?  · Do not leave any items on the stairs.  · Make sure that there are handrails on both sides of the stairs. Fix handrails that are broken or loose. Make sure that handrails are as long as the stairways.  · Check any carpeting to make sure that it is firmly attached to the stairs. Fix any carpet that is loose or worn.  · Avoid having throw rugs at the top or bottom of stairways, or secure the rugs with carpet tape to prevent them from moving.  · Make sure that you have a light switch at the top of the stairs and the bottom of the stairs. If you do not have them, have them installed.  What are some other fall prevention tips?  · Wear closed-toe shoes that fit well and support your feet. Wear shoes that have rubber soles or low heels.  · When you use a stepladder, make sure that it is completely opened and that the sides are firmly locked. Have someone hold the ladder while you are using it. Do not climb a closed stepladder.  · Add color or contrast paint or tape to grab bars and handrails in your home. Place contrasting color strips on the first and last steps.  · Use mobility aids as needed, such as canes, walkers, scooters, and crutches.  · Turn on lights if it is dark. Replace any light bulbs that burn out.  · Set up furniture so that there are clear paths. Keep the furniture in the same spot.  · Fix any uneven floor surfaces.  · Choose a carpet design that does not hide the edge of steps of a stairway.  · Be aware of any and all pets.  · Review your medicines with your healthcare provider. Some medicines can cause dizziness or changes in blood pressure, which increase your risk of  falling.  Talk with your health care provider about other ways that you can decrease your risk of falls. This may include working with a physical therapist or  to improve your strength, balance, and endurance.  This information is not intended to replace advice given to you by your health care provider. Make sure you discuss any questions you have with your health care provider.  Document Released: 2003 Document Revised: 2017 Document Reviewed: 2016  [employer] Interactive Patient Education © 2017 [employer]    Medicare Wellness  Personal Prevention Plan of Service     Date of Office Visit:  2018  Encounter Provider:  Titi Cotton MD  Place of Service:  [facility]  Patient Name: Hailey Dickerson  :  1940    As part of the Medicare Wellness portion of your visit today, we are providing you with this personalized preventive plan of services (PPPS). This plan is based upon recommendations of the [place] Preventive Services Task Force (USPSTF) and the Advisory Committee on Immunization Practices (ACIP).    This lists the preventive care services that should be considered, and provides dates of when you are due. Items listed as completed are up-to-date and do not require any further intervention.    Health Maintenance   Topic Date Due   • MEDICARE ANNUAL WELLNESS  2018   • LIPID PANEL  2018   • TDAP/TD VACCINES (2 - Td) 2022   • INFLUENZA VACCINE  Completed   • PNEUMOCOCCAL VACCINES (65+ LOW/MEDIUM RISK)  Completed   • ZOSTER VACCINE  Completed       No orders of the defined types were placed in this encounter.      Return in 6 months (on 2018).

## 2018-03-22 NOTE — PROGRESS NOTES
QUICK REFERENCE INFORMATION:  The ABCs of the Annual Wellness Visit    Subsequent Medicare Wellness Visit    HEALTH RISK ASSESSMENT    1940    Recent Hospitalizations:  No hospitalization(s) within the last year..        Current Medical Providers:  Patient Care Team:  Titi Cotton MD as PCP - General (Family Medicine)        Smoking Status:  History   Smoking Status   • Never Smoker   Smokeless Tobacco   • Never Used       Alcohol Consumption:  History   Alcohol Use No       Depression Screen:   PHQ-2/PHQ-9 Depression Screening 3/22/2018   Little interest or pleasure in doing things 0   Feeling down, depressed, or hopeless 0   Trouble falling or staying asleep, or sleeping too much -   Feeling tired or having little energy -   Poor appetite or overeating -   Feeling bad about yourself - or that you are a failure or have let yourself or your family down -   Trouble concentrating on things, such as reading the newspaper or watching television -   Moving or speaking so slowly that other people could have noticed. Or the opposite - being so fidgety or restless that you have been moving around a lot more than usual -   Thoughts that you would be better off dead, or of hurting yourself in some way -   Total Score 0   If you checked off any problems, how difficult have these problems made it for you to do your work, take care of things at home, or get along with other people? -       Health Habits and Functional and Cognitive Screening:  Functional & Cognitive Status 3/22/2018   Do you have difficulty preparing food and eating? No   Do you have difficulty bathing yourself, getting dressed or grooming yourself? No   Do you have difficulty using the toilet? No   Do you have difficulty moving around from place to place? No   Do you have trouble with steps or getting out of a bed or a chair? No   In the past year have you fallen or experienced a near fall? No   Current Diet Well Balanced Diet   Dental Exam Not  "up to date   Eye Exam Not up to date   Exercise (times per week) 0 times per week   Current Exercise Activities Include None   Do you need help using the phone?  No   Are you deaf or do you have serious difficulty hearing?  No   Do you need help with transportation? No   Do you need help shopping? No   Do you need help preparing meals?  No   Do you need help with housework?  No   Do you need help with laundry? No   Do you need help taking your medications? No   Do you need help managing money? No   Do you ever drive or ride in a car without wearing a seat belt? No   Have you felt unusual stress, anger or loneliness in the last month? No   Who do you live with? Spouse   If you need help, do you have trouble finding someone available to you? No   Have you been bothered in the last four weeks by sexual problems? No   Do you have difficulty concentrating, remembering or making decisions? -     -- The Timed Up and Go (TUG) Test (CDC Version)                  - Testing directions adhered to:                                  1) Patients wears regular footwear and may use walking aid(s) if    needed.                                  2) Patient to sit back in standard arm chair and identify a line 10 feet    away from patient on floor.                                  3) Test time begins at \"Go\" and stops when patient reseated in arm    chair.                    - Instructions read aloud (and demonstrated as applicable) to patient:                                      When I say \"Go,\" I want you to:                                    1) Stand up from the chair.                                  2) Walk to the line on the floor (10 feet away) at your normal pace.                                  3) Turn.                                  4) Walk back to the chair at your normal pace.                                  5) Sit down again.                    - Result: 3                    - Observations during test:Normal " gait            Does the patient have evidence of cognitive impairment? No    Aspirin use counseling: Taking ASA appropriately as indicated      Recent Lab Results:  CMP:  Lab Results   Component Value Date    GLU 91 03/21/2017    BUN 19 03/21/2017    CREATININE 0.93 03/21/2017    EGFRIFNONA 58 (L) 03/21/2017    EGFRIFAFRI 71 03/21/2017    BCR 20.4 03/21/2017     03/21/2017    K 4.5 03/21/2017    CO2 30.0 03/21/2017    CALCIUM 9.8 03/21/2017    PROTENTOTREF 7.2 03/21/2017    ALBUMIN 4.40 03/21/2017    LABGLOBREF 2.8 03/21/2017    LABIL2 1.6 03/21/2017    BILITOT 0.5 03/21/2017    ALKPHOS 67 03/21/2017    AST 24 03/21/2017    ALT 24 03/21/2017     Lipid Panel:  Lab Results   Component Value Date    TRIG 152 (H) 03/21/2017    HDL 56 03/21/2017    VLDL 30.4 03/21/2017     HbA1c:       Visual Acuity:  Right eye 20/20 and Left eye 20/30 w/o glasses.    Age-appropriate Screening Schedule:  Refer to the list below for future screening recommendations based on patient's age, sex and/or medical conditions. Orders for these recommended tests are listed in the plan section. The patient has been provided with a written plan.    Health Maintenance   Topic Date Due   • LIPID PANEL  03/21/2018   • TDAP/TD VACCINES (2 - Td) 12/18/2022   • COLONOSCOPY  12/04/2023   • INFLUENZA VACCINE  Completed   • PNEUMOCOCCAL VACCINES (65+ LOW/MEDIUM RISK)  Completed   • ZOSTER VACCINE  Completed        Subjective   History of Present Illness    Hailey Dickerson is a 77 y.o. female who presents for an Subsequent Wellness Visit.    The following portions of the patient's history were reviewed and updated as appropriate: allergies, current medications, past family history, past medical history, past social history, past surgical history and problem list.    Outpatient Medications Prior to Visit   Medication Sig Dispense Refill   • aspirin 81 MG EC tablet Take 81 mg by mouth Daily.     • citalopram (CeleXA) 20 MG tablet Take 1 tablet by mouth  Daily. 90 tablet 0   • Cranberry 125 MG tablet Take  by mouth Daily.     • indapamide (LOZOL) 2.5 MG tablet Take 1 tablet by mouth Daily. Take by mouth on Monday, Wednesday, and Friday 90 tablet 1   • lisinopril (PRINIVIL,ZESTRIL) 10 MG tablet Take 1 tablet by mouth Daily. 90 tablet 0   • Multiple Vitamins-Minerals (MULTIVITAMIN WITH MINERALS) tablet tablet Take 1 tablet by mouth Daily.     • raNITIdine (ZANTAC) 300 MG tablet Take 1 tablet by mouth Every Night. 90 tablet 1   • simvastatin (ZOCOR) 20 MG tablet TAKE ONE TABLET BY MOUTH IN THE EVENING 90 tablet 1   • ibandronate (BONIVA) 150 MG tablet      • meclizine (ANTIVERT) 25 MG tablet Take 25 mg by mouth 3 (Three) Times a Day.       No facility-administered medications prior to visit.        Patient Active Problem List   Diagnosis   • Essential hypertension   • Anxiety   • GERD (gastroesophageal reflux disease)       Advance Care Planning:  has NO advance directive - information provided to the patient today    Identification of Risk Factors:  Risk factors include: weight , unhealthy diet, cardiovascular risk and increased fall risk.    Review of Systems   Cardiovascular:        Sees cardiologist regularly   Genitourinary: Negative for dysuria.   Musculoskeletal:        List disease going out-seeing [facility]   All other systems reviewed and are negative.      Compared to one year ago, the patient feels her physical health is the same.  Compared to one year ago, the patient feels her mental health is the same.    Objective     Physical Exam   Constitutional: She is oriented to person, place, and time.   Obesity   HENT:   Right Ear: External ear normal.   Left Ear: External ear normal.   Mouth/Throat: Oropharynx is clear and moist.   Eyes: EOM are normal. Pupils are equal, round, and reactive to light.   Neck: No thyromegaly present.   Good carotid pulses-no bruits   Cardiovascular: Normal rate and regular rhythm.    Pulmonary/Chest: Effort normal and  "breath sounds normal.   Abdominal: Soft. Bowel sounds are normal.   Genitourinary: Vagina normal and uterus normal.   Genitourinary Comments: Pelvic-  Pap smear not taken --uterus normal size  , no adnexal masses   Musculoskeletal: She exhibits no edema.   Lymphadenopathy:     She has no cervical adenopathy.   Neurological: She is alert and oriented to person, place, and time.   Skin: Skin is warm and dry. Capillary refill takes less than 2 seconds.   Psychiatric: She has a normal mood and affect. Her behavior is normal. Judgment and thought content normal.   Nursing note and vitals reviewed.      Vitals:    03/22/18 0849   BP: 112/64   BP Location: Left arm   Patient Position: Sitting   Cuff Size: Adult   Pulse: 72   Temp: 98.6 °F (37 °C)   TempSrc: Oral   SpO2: 98%   Weight: 88.9 kg (196 lb)   Height: 162.6 cm (64.02\")   PainSc: 0-No pain       Body mass index is 33.63 kg/m².  Discussed the patient's BMI with her. BMI is above normal parameters. Follow-up plan includes:  educational material.    Assessment/Plan   Patient Self-Management and Personalized Health Advice  The patient has been provided with information about: diet, exercise, weight management and fall prevention and preventive services including:   · Advance directive, Colorectal cancer screening, fecal occult blood test, Fall Risk plan of care done, Screening mammography, referral placed.    Visit Diagnoses:    ICD-10-CM ICD-9-CM   1. Physical exam, annual Z00.00 V70.0   2. Mixed hyperlipidemia E78.2 272.2   3. Fatigue, unspecified type R53.83 780.79   4. Vitamin D deficiency E55.9 268.9       No orders of the defined types were placed in this encounter.      Outpatient Encounter Prescriptions as of 3/22/2018   Medication Sig Dispense Refill   • alendronate (FOSAMAX) 70 MG tablet Take 70 mg by mouth Every 7 (Seven) Days.     • aspirin 81 MG EC tablet Take 81 mg by mouth Daily.     • citalopram (CeleXA) 20 MG tablet Take 1 tablet by mouth Daily. 90 " tablet 0   • Cranberry 125 MG tablet Take  by mouth Daily.     • indapamide (LOZOL) 2.5 MG tablet Take 1 tablet by mouth Daily. Take by mouth on Monday, Wednesday, and Friday 90 tablet 1   • lisinopril (PRINIVIL,ZESTRIL) 10 MG tablet Take 1 tablet by mouth Daily. 90 tablet 0   • Multiple Vitamins-Minerals (MULTIVITAMIN WITH MINERALS) tablet tablet Take 1 tablet by mouth Daily.     • raNITIdine (ZANTAC) 300 MG tablet Take 1 tablet by mouth Every Night. 90 tablet 1   • simvastatin (ZOCOR) 20 MG tablet TAKE ONE TABLET BY MOUTH IN THE EVENING 90 tablet 1   • [DISCONTINUED] ibandronate (BONIVA) 150 MG tablet      • [DISCONTINUED] meclizine (ANTIVERT) 25 MG tablet Take 25 mg by mouth 3 (Three) Times a Day.       No facility-administered encounter medications on file as of 3/22/2018.        Reviewed use of high risk medication in the elderly: yes  Reviewed for potential of harmful drug interactions in the elderly: yes    Follow Up:  Return in 6 months (on 9/22/2018).     An After Visit Summary and PPPS with all of these plans were given to the patient.       plan above-keep seeing specialist

## 2018-03-27 ENCOUNTER — PROCEDURE VISIT (OUTPATIENT)
Dept: FAMILY MEDICINE CLINIC | Facility: CLINIC | Age: 78
End: 2018-03-27

## 2018-03-27 VITALS
OXYGEN SATURATION: 98 % | HEART RATE: 73 BPM | HEIGHT: 64 IN | BODY MASS INDEX: 33.46 KG/M2 | TEMPERATURE: 98.1 F | SYSTOLIC BLOOD PRESSURE: 118 MMHG | DIASTOLIC BLOOD PRESSURE: 70 MMHG | WEIGHT: 196 LBS

## 2018-03-27 DIAGNOSIS — L98.9 SKIN LESION: Primary | ICD-10-CM

## 2018-03-27 PROCEDURE — 17000 DESTRUCT PREMALG LESION: CPT | Performed by: FAMILY MEDICINE

## 2018-03-27 NOTE — PROGRESS NOTES
Procedure   Destruction of Lesion  Date/Time: 3/27/2018 10:56 AM  Performed by: KURTIS DOZIER  Authorized by: KURTIS DOZIER   Preparation: Patient was prepped and draped in the usual sterile fashion.  Local anesthesia used: yes    Anesthesia:  Local anesthesia used: yes  Local Anesthetic: lidocaine 1% without epinephrine  Anesthetic total: 2 mL    Sedation:  Patient sedated: no  Patient tolerance: Patient tolerated the procedure well with no immediate complications  Comments: Patient had a 1and 1/2 Cm skin lesion left forehead with recent change.  After local anesthesia it was removed by sharp dissection and curettement.  It was submitted for pathologic examination.  Hot cautery was used to cauterize the base and wound care instructions were given    Lot number         [id number] and NDC number [id number].

## 2018-03-28 LAB
BACTERIA UR CULT: ABNORMAL
BACTERIA UR CULT: ABNORMAL
OTHER ANTIBIOTIC SUSC ISLT: ABNORMAL

## 2018-04-02 LAB
DX ICD CODE: NORMAL
DX ICD CODE: NORMAL
PATH REPORT.FINAL DX SPEC: NORMAL
PATH REPORT.GROSS SPEC: NORMAL
PATH REPORT.SITE OF ORIGIN SPEC: NORMAL
PATHOLOGIST NAME: NORMAL
PAYMENT PROCEDURE: NORMAL

## 2018-04-03 DIAGNOSIS — Z12.31 ENCOUNTER FOR SCREENING MAMMOGRAM FOR MALIGNANT NEOPLASM OF BREAST: ICD-10-CM

## 2018-04-22 ENCOUNTER — RESULTS ENCOUNTER (OUTPATIENT)
Dept: FAMILY MEDICINE CLINIC | Facility: CLINIC | Age: 78
End: 2018-04-22

## 2018-04-22 DIAGNOSIS — Z12.12 SCREENING FOR MALIGNANT NEOPLASM OF THE RECTUM: ICD-10-CM

## 2018-05-14 RX ORDER — LISINOPRIL 10 MG/1
TABLET ORAL
Qty: 90 TABLET | Refills: 3 | Status: SHIPPED | OUTPATIENT
Start: 2018-05-14 | End: 2019-07-30 | Stop reason: SDUPTHER

## 2018-05-29 RX ORDER — INDAPAMIDE 2.5 MG/1
TABLET, FILM COATED ORAL
Qty: 30 TABLET | Refills: 5 | Status: SHIPPED | OUTPATIENT
Start: 2018-05-29 | End: 2018-09-25

## 2018-09-25 ENCOUNTER — OFFICE VISIT (OUTPATIENT)
Dept: FAMILY MEDICINE CLINIC | Facility: CLINIC | Age: 78
End: 2018-09-25

## 2018-09-25 VITALS
OXYGEN SATURATION: 98 % | DIASTOLIC BLOOD PRESSURE: 74 MMHG | HEIGHT: 64 IN | TEMPERATURE: 97.8 F | WEIGHT: 196.6 LBS | BODY MASS INDEX: 33.57 KG/M2 | SYSTOLIC BLOOD PRESSURE: 138 MMHG | HEART RATE: 86 BPM

## 2018-09-25 DIAGNOSIS — N39.0 URINARY TRACT INFECTION WITH HEMATURIA, SITE UNSPECIFIED: ICD-10-CM

## 2018-09-25 DIAGNOSIS — R31.9 URINARY TRACT INFECTION WITH HEMATURIA, SITE UNSPECIFIED: ICD-10-CM

## 2018-09-25 DIAGNOSIS — E78.2 MIXED HYPERLIPIDEMIA: Primary | ICD-10-CM

## 2018-09-25 DIAGNOSIS — Z23 NEED FOR INFLUENZA VACCINATION: ICD-10-CM

## 2018-09-25 LAB
ALBUMIN SERPL-MCNC: 4.3 G/DL (ref 3.5–5)
ALBUMIN/GLOB SERPL: 1.7 G/DL (ref 1.1–2.5)
ALP SERPL-CCNC: 49 U/L (ref 24–120)
ALT SERPL-CCNC: 22 U/L (ref 0–54)
AST SERPL-CCNC: 25 U/L (ref 7–45)
BILIRUB BLD-MCNC: NEGATIVE MG/DL
BILIRUB SERPL-MCNC: 0.5 MG/DL (ref 0.1–1)
BUN SERPL-MCNC: 17 MG/DL (ref 5–21)
BUN/CREAT SERPL: 17.2 (ref 7–25)
CALCIUM SERPL-MCNC: 9.5 MG/DL (ref 8.4–10.4)
CHLORIDE SERPL-SCNC: 100 MMOL/L (ref 98–110)
CHOLEST SERPL-MCNC: 173 MG/DL (ref 130–200)
CLARITY, POC: ABNORMAL
CO2 SERPL-SCNC: 25 MMOL/L (ref 24–31)
COLOR UR: YELLOW
CREAT SERPL-MCNC: 0.99 MG/DL (ref 0.5–1.4)
GLOBULIN SER CALC-MCNC: 2.6 GM/DL
GLUCOSE SERPL-MCNC: 95 MG/DL (ref 70–100)
GLUCOSE UR STRIP-MCNC: NEGATIVE MG/DL
HDLC SERPL-MCNC: 41 MG/DL
KETONES UR QL: NEGATIVE
LDLC SERPL CALC-MCNC: 75 MG/DL (ref 0–99)
LEUKOCYTE EST, POC: ABNORMAL
NITRITE UR-MCNC: POSITIVE MG/ML
PH UR: 6 [PH] (ref 5–8)
POTASSIUM SERPL-SCNC: 4.3 MMOL/L (ref 3.5–5.3)
PROT SERPL-MCNC: 6.9 G/DL (ref 6.3–8.7)
PROT UR STRIP-MCNC: ABNORMAL MG/DL
RBC # UR STRIP: ABNORMAL /UL
SODIUM SERPL-SCNC: 137 MMOL/L (ref 135–145)
SP GR UR: 1.01 (ref 1–1.03)
TRIGL SERPL-MCNC: 286 MG/DL (ref 0–149)
UROBILINOGEN UR QL: NORMAL
VLDLC SERPL CALC-MCNC: 57.2 MG/DL

## 2018-09-25 PROCEDURE — 81003 URINALYSIS AUTO W/O SCOPE: CPT | Performed by: FAMILY MEDICINE

## 2018-09-25 PROCEDURE — 90662 IIV NO PRSV INCREASED AG IM: CPT | Performed by: FAMILY MEDICINE

## 2018-09-25 PROCEDURE — 99214 OFFICE O/P EST MOD 30 MIN: CPT | Performed by: FAMILY MEDICINE

## 2018-09-25 PROCEDURE — G0008 ADMIN INFLUENZA VIRUS VAC: HCPCS | Performed by: FAMILY MEDICINE

## 2018-09-25 RX ORDER — CIPROFLOXACIN 500 MG/1
500 TABLET, FILM COATED ORAL EVERY 12 HOURS SCHEDULED
Qty: 14 TABLET | Refills: 0 | Status: SHIPPED | OUTPATIENT
Start: 2018-09-25 | End: 2019-03-26

## 2018-09-25 NOTE — PROGRESS NOTES
Subjective   Hailey Dickerson is a 78 y.o. female.     Hyperlipidemia   This is a chronic problem. The current episode started more than 1 year ago. The problem is controlled. There are no known factors aggravating her hyperlipidemia. Pertinent negatives include no chest pain. Current antihyperlipidemic treatment includes diet change and statins. The current treatment provides moderate improvement of lipids. Compliance problems include adherence to diet and adherence to exercise.  Risk factors for coronary artery disease include hypertension, dyslipidemia, obesity, a sedentary lifestyle and post-menopausal.       The following portions of the patient's history were reviewed and updated as appropriate: allergies, current medications, past family history, past medical history, past social history, past surgical history and problem list.    Review of Systems   Cardiovascular: Negative for chest pain and leg swelling.   Genitourinary: Positive for dysuria.   Musculoskeletal: Positive for arthralgias.       Objective   Physical Exam   Constitutional: She is oriented to person, place, and time.   Obesity   HENT:   Mouth/Throat: Oropharynx is clear and moist.   Cardiovascular: Normal rate and regular rhythm.    Pulmonary/Chest: Effort normal and breath sounds normal.   Abdominal: Soft. There is no tenderness.   Musculoskeletal: She exhibits no edema.   Neurological: She is alert and oriented to person, place, and time.   Skin: Skin is warm and dry.   Psychiatric: She has a normal mood and affect. Her behavior is normal. Judgment and thought content normal.   Nursing note and vitals reviewed.      Assessment/Plan   Hailey was seen today for follow-up.    Diagnoses and all orders for this visit:    Mixed hyperlipidemia  -     Comprehensive Metabolic Panel  -     Lipid Panel    Need for influenza vaccination  -     Fluzone High Dose =>65Years       Plan above

## 2018-09-29 LAB
BACTERIA UR CULT: ABNORMAL
BACTERIA UR CULT: ABNORMAL
OTHER ANTIBIOTIC SUSC ISLT: ABNORMAL

## 2018-10-01 DIAGNOSIS — N39.0 URINARY TRACT INFECTION WITHOUT HEMATURIA, SITE UNSPECIFIED: Primary | ICD-10-CM

## 2018-10-15 ENCOUNTER — OFFICE VISIT (OUTPATIENT)
Dept: FAMILY MEDICINE CLINIC | Facility: CLINIC | Age: 78
End: 2018-10-15

## 2018-10-15 VITALS
WEIGHT: 193.4 LBS | DIASTOLIC BLOOD PRESSURE: 82 MMHG | TEMPERATURE: 98 F | HEIGHT: 64 IN | HEART RATE: 75 BPM | OXYGEN SATURATION: 98 % | BODY MASS INDEX: 33.02 KG/M2 | SYSTOLIC BLOOD PRESSURE: 145 MMHG

## 2018-10-15 DIAGNOSIS — R07.9 CHEST PAIN, UNSPECIFIED TYPE: Primary | ICD-10-CM

## 2018-10-15 PROCEDURE — 93000 ELECTROCARDIOGRAM COMPLETE: CPT | Performed by: FAMILY MEDICINE

## 2018-10-15 PROCEDURE — 99214 OFFICE O/P EST MOD 30 MIN: CPT | Performed by: FAMILY MEDICINE

## 2018-10-15 RX ORDER — BUTALBITAL, ACETAMINOPHEN AND CAFFEINE 50; 325; 40 MG/1; MG/1; MG/1
TABLET ORAL
Qty: 10 TABLET | Refills: 2 | Status: SHIPPED | OUTPATIENT
Start: 2018-10-15 | End: 2020-05-05 | Stop reason: SDUPTHER

## 2018-10-15 NOTE — PROGRESS NOTES
Subjective   Hailey Dickerson is a 78 y.o. female.     Heartburn   She complains of chest pain and nausea. This is a chronic problem. The current episode started more than 1 year ago. The problem occurs frequently. The symptoms are aggravated by certain foods. There are no known risk factors. She has tried an antacid and a histamine-2 antagonist for the symptoms.       The following portions of the patient's history were reviewed and updated as appropriate: allergies, current medications, past family history, past medical history, past social history, past surgical history and problem list.    Review of Systems   HENT: Positive for sinus pain and sinus pressure.    Respiratory: Negative for shortness of breath.    Cardiovascular: Positive for chest pain.   Gastrointestinal: Positive for nausea.   Neurological: Positive for headaches.        Classic muscle contraction headache       Objective   Physical Exam   Constitutional: She is oriented to person, place, and time.   HENT:   Right Ear: External ear normal.   Left Ear: External ear normal.   Mouth/Throat: Oropharynx is clear and moist.   Cardiovascular: Normal rate and regular rhythm.    Pulmonary/Chest: Effort normal and breath sounds normal.   Abdominal: Soft. There is no tenderness.   Neurological: She is alert and oriented to person, place, and time.   Psychiatric: She has a normal mood and affect. Her behavior is normal. Judgment and thought content normal.   Nursing note and vitals reviewed.      Assessment/Plan   Hailey was seen today for headache.    Diagnoses and all orders for this visit:    Chest pain, unspecified type  -     ECG 12 Lead  -     CBC & Differential  -     Comprehensive Metabolic Panel  -     Troponin I    Other orders  -     butalbital-acetaminophen-caffeine (FIORICET, ESGIC) -40 MG per tablet; One every 6 hours when necessary headache       Plan-above

## 2018-10-16 LAB
ALBUMIN SERPL-MCNC: 4.6 G/DL (ref 3.5–5)
ALBUMIN/GLOB SERPL: 1.5 G/DL (ref 1.1–2.5)
ALP SERPL-CCNC: 50 U/L (ref 24–120)
ALT SERPL-CCNC: 18 U/L (ref 0–54)
AST SERPL-CCNC: 20 U/L (ref 7–45)
BASOPHILS # BLD AUTO: 0.08 10*3/MM3 (ref 0–0.2)
BASOPHILS NFR BLD AUTO: 1 % (ref 0–2)
BILIRUB SERPL-MCNC: 0.3 MG/DL (ref 0.1–1)
BUN SERPL-MCNC: 29 MG/DL (ref 5–21)
BUN/CREAT SERPL: 27.1 (ref 7–25)
CALCIUM SERPL-MCNC: 9.9 MG/DL (ref 8.4–10.4)
CHLORIDE SERPL-SCNC: 98 MMOL/L (ref 98–110)
CO2 SERPL-SCNC: 28 MMOL/L (ref 24–31)
CREAT SERPL-MCNC: 1.07 MG/DL (ref 0.5–1.4)
EOSINOPHIL # BLD AUTO: 0.06 10*3/MM3 (ref 0–0.7)
EOSINOPHIL NFR BLD AUTO: 0.7 % (ref 0–4)
ERYTHROCYTE [DISTWIDTH] IN BLOOD BY AUTOMATED COUNT: 11.8 % (ref 12–15)
GLOBULIN SER CALC-MCNC: 3.1 GM/DL
GLUCOSE SERPL-MCNC: 97 MG/DL (ref 70–100)
HCT VFR BLD AUTO: 43.8 % (ref 37–47)
HGB BLD-MCNC: 14.5 G/DL (ref 12–16)
IMM GRANULOCYTES # BLD: 0.03 10*3/MM3 (ref 0–0.03)
IMM GRANULOCYTES NFR BLD: 0.4 % (ref 0–5)
LYMPHOCYTES # BLD AUTO: 3.39 10*3/MM3 (ref 0.72–4.86)
LYMPHOCYTES NFR BLD AUTO: 41 % (ref 15–45)
MCH RBC QN AUTO: 32.1 PG (ref 28–32)
MCHC RBC AUTO-ENTMCNC: 33.1 G/DL (ref 33–36)
MCV RBC AUTO: 96.9 FL (ref 82–98)
MONOCYTES # BLD AUTO: 0.68 10*3/MM3 (ref 0.19–1.3)
MONOCYTES NFR BLD AUTO: 8.2 % (ref 4–12)
NEUTROPHILS # BLD AUTO: 4.03 10*3/MM3 (ref 1.87–8.4)
NEUTROPHILS NFR BLD AUTO: 48.7 % (ref 39–78)
NRBC BLD AUTO-RTO: 0 /100 WBC (ref 0–0)
PLATELET # BLD AUTO: 298 10*3/MM3 (ref 130–400)
POTASSIUM SERPL-SCNC: 4.3 MMOL/L (ref 3.5–5.3)
PROT SERPL-MCNC: 7.7 G/DL (ref 6.3–8.7)
RBC # BLD AUTO: 4.52 10*6/MM3 (ref 4.2–5.4)
SODIUM SERPL-SCNC: 138 MMOL/L (ref 135–145)
TROPONIN I SERPL-MCNC: <0.01 NG/ML (ref 0–0.04)
WBC # BLD AUTO: 8.27 10*3/MM3 (ref 4.8–10.8)

## 2019-03-26 ENCOUNTER — OFFICE VISIT (OUTPATIENT)
Dept: FAMILY MEDICINE CLINIC | Facility: CLINIC | Age: 79
End: 2019-03-26

## 2019-03-26 VITALS
BODY MASS INDEX: 33.32 KG/M2 | HEART RATE: 77 BPM | OXYGEN SATURATION: 97 % | HEIGHT: 64 IN | SYSTOLIC BLOOD PRESSURE: 122 MMHG | WEIGHT: 195.2 LBS | TEMPERATURE: 98 F | DIASTOLIC BLOOD PRESSURE: 88 MMHG

## 2019-03-26 DIAGNOSIS — Z12.12 SCREENING FOR COLORECTAL CANCER: ICD-10-CM

## 2019-03-26 DIAGNOSIS — I10 HYPERTENSION, UNSPECIFIED TYPE: ICD-10-CM

## 2019-03-26 DIAGNOSIS — Z00.00 ANNUAL PHYSICAL EXAM: ICD-10-CM

## 2019-03-26 DIAGNOSIS — E55.9 VITAMIN D DEFICIENCY: ICD-10-CM

## 2019-03-26 DIAGNOSIS — Z12.39 SCREENING FOR MALIGNANT NEOPLASM OF BREAST: ICD-10-CM

## 2019-03-26 DIAGNOSIS — Z12.11 SCREENING FOR COLORECTAL CANCER: ICD-10-CM

## 2019-03-26 DIAGNOSIS — E78.2 MIXED HYPERLIPIDEMIA: ICD-10-CM

## 2019-03-26 DIAGNOSIS — R53.83 FATIGUE, UNSPECIFIED TYPE: Primary | ICD-10-CM

## 2019-03-26 DIAGNOSIS — Z12.4 ROUTINE CERVICAL SMEAR: ICD-10-CM

## 2019-03-26 LAB
BILIRUB BLD-MCNC: NEGATIVE MG/DL
CLARITY, POC: CLEAR
COLOR UR: YELLOW
GLUCOSE UR STRIP-MCNC: NEGATIVE MG/DL
KETONES UR QL: NEGATIVE
LEUKOCYTE EST, POC: ABNORMAL
NITRITE UR-MCNC: NEGATIVE MG/ML
PH UR: 6 [PH] (ref 5–8)
PROT UR STRIP-MCNC: ABNORMAL MG/DL
RBC # UR STRIP: NEGATIVE /UL
SP GR UR: 1.01 (ref 1–1.03)
UROBILINOGEN UR QL: NORMAL

## 2019-03-26 PROCEDURE — 81003 URINALYSIS AUTO W/O SCOPE: CPT | Performed by: FAMILY MEDICINE

## 2019-03-26 PROCEDURE — G0439 PPPS, SUBSEQ VISIT: HCPCS | Performed by: FAMILY MEDICINE

## 2019-03-26 RX ORDER — ESTRADIOL 0.1 MG/G
CREAM VAGINAL
Status: ON HOLD | COMMUNITY
End: 2019-11-07

## 2019-03-26 NOTE — PATIENT INSTRUCTIONS
Exercising to Stay Healthy  Exercising regularly is important. It has many health benefits, such as:  · Improving your overall fitness, flexibility, and endurance.  · Increasing your bone density.  · Helping with weight control.  · Decreasing your body fat.  · Increasing your muscle strength.  · Reducing stress and tension.  · Improving your overall health.    In order to become healthy and stay healthy, it is recommended that you do moderate-intensity and vigorous-intensity exercise. You can tell that you are exercising at a moderate intensity if you have a higher heart rate and faster breathing, but you are still able to hold a conversation. You can tell that you are exercising at a vigorous intensity if you are breathing much harder and faster and cannot hold a conversation while exercising.  How often should I exercise?  Choose an activity that you enjoy and set realistic goals. Your health care provider can help you to make an activity plan that works for you. Exercise regularly as directed by your health care provider. This may include:  · Doing resistance training twice each week, such as:  ? Push-ups.  ? Sit-ups.  ? Lifting weights.  ? Using resistance bands.  · Doing a given intensity of exercise for a given amount of time. Choose from these options:  ? 150 minutes of moderate-intensity exercise every week.  ? 75 minutes of vigorous-intensity exercise every week.  ? A mix of moderate-intensity and vigorous-intensity exercise every week.    Children, pregnant women, people who are out of shape, people who are overweight, and older adults may need to consult a health care provider for individual recommendations. If you have any sort of medical condition, be sure to consult your health care provider before starting a new exercise program.  What are some exercise ideas?  Some moderate-intensity exercise ideas include:  · Walking at a rate of 1 mile in 15  minutes.  · Biking.  · Hiking.  · Golfing.  · Dancing.    Some vigorous-intensity exercise ideas include:  · Walking at a rate of at least 4.5 miles per hour.  · Jogging or running at a rate of 5 miles per hour.  · Biking at a rate of at least 10 miles per hour.  · Lap swimming.  · Roller-skating or in-line skating.  · Cross-country skiing.  · Vigorous competitive sports, such as football, basketball, and soccer.  · Jumping rope.  · Aerobic dancing.    What are some everyday activities that can help me to get exercise?  · Yard work, such as:  ? Pushing a .  ? Raking and bagging leaves.  · Washing and waxing your car.  · Pushing a stroller.  · Shoveling snow.  · Gardening.  · Washing windows or floors.  How can I be more active in my day-to-day activities?  · Use the stairs instead of the elevator.  · Take a walk during your lunch break.  · If you drive, park your car farther away from work or school.  · If you take public transportation, get off one stop early and walk the rest of the way.  · Make all of your phone calls while standing up and walking around.  · Get up, stretch, and walk around every 30 minutes throughout the day.  What guidelines should I follow while exercising?  · Do not exercise so much that you hurt yourself, feel dizzy, or get very short of breath.  · Consult your health care provider before starting a new exercise program.  · Wear comfortable clothes and shoes with good support.  · Drink plenty of water while you exercise to prevent dehydration or heat stroke. Body water is lost during exercise and must be replaced.  · Work out until you breathe faster and your heart beats faster.  This information is not intended to replace advice given to you by your health care provider. Make sure you discuss any questions you have with your health care provider.  Document Released: 01/20/2012 Document Revised: 05/25/2017 Document Reviewed: 05/21/2015  [employer] Interactive Patient Education © 2018  [employer]      Fall Prevention in the Home, Adult  Falls can cause injuries and can affect people from all age groups. There are many simple things that you can do to make your home safe and to help prevent falls. Ask for help when making these changes, if needed.  What actions can I take to prevent falls?  General instructions  · Use good lighting in all rooms. Replace any light bulbs that burn out.  · Turn on lights if it is dark. Use night-lights.  · Place frequently used items in easy-to-reach places. Lower the shelves around your home if necessary.  · Set up furniture so that there are clear paths around it. Avoid moving your furniture around.  · Remove throw rugs and other tripping hazards from the floor.  · Avoid walking on wet floors.  · Fix any uneven floor surfaces.  · Add color or contrast paint or tape to grab bars and handrails in your home. Place contrasting color strips on the first and last steps of stairways.  · When you use a stepladder, make sure that it is completely opened and that the sides are firmly locked. Have someone hold the ladder while you are using it. Do not climb a closed stepladder.  · Be aware of any and all pets.  What can I do in the bathroom?  · Keep the floor dry. Immediately clean up any water that spills onto the floor.  · Remove soap buildup in the tub or shower on a regular basis.  · Use non-skid mats or decals on the floor of the tub or shower.  · Attach bath mats securely with double-sided, non-slip rug tape.  · If you need to sit down while you are in the shower, use a plastic, non-slip stool.  · Install grab bars by the toilet and in the tub and shower. Do not use towel bars as grab bars.  What can I do in the bedroom?  · Make sure that a bedside light is easy to reach.  · Do not use oversized bedding that drapes onto the floor.  · Have a firm chair that has side arms to use for getting dressed.  What can I do in the kitchen?  · Clean up any spills right away.  · If  you need to reach for something above you, use a sturdy step stool that has a grab bar.  · Keep electrical cables out of the way.  · Do not use floor polish or wax that makes floors slippery. If you must use wax, make sure that it is non-skid floor wax.  What can I do in the stairways?  · Do not leave any items on the stairs.  · Make sure that you have a light switch at the top of the stairs and the bottom of the stairs. Have them installed if you do not have them.  · Make sure that there are handrails on both sides of the stairs. Fix handrails that are broken or loose. Make sure that handrails are as long as the stairways.  · Install non-slip stair treads on all stairs in your home.  · Avoid having throw rugs at the top or bottom of stairways, or secure the rugs with carpet tape to prevent them from moving.  · Choose a carpet design that does not hide the edge of steps on the stairway.  · Check any carpeting to make sure that it is firmly attached to the stairs. Fix any carpet that is loose or worn.  What can I do on the outside of my home?  · Use bright outdoor lighting.  · Regularly repair the edges of walkways and driveways and fix any cracks.  · Remove high doorway thresholds.  · Trim any shrubbery on the main path into your home.  · Regularly check that handrails are securely fastened and in good repair. Both sides of any steps should have handrails.  · Install guardrails along the edges of any raised decks or porches.  · Clear walkways of debris and clutter, including tools and rocks.  · Have leaves, snow, and ice cleared regularly.  · Use sand or salt on walkways during winter months.  · In the garage, clean up any spills right away, including grease or oil spills.  What other actions can I take?  · Wear closed-toe shoes that fit well and support your feet. Wear shoes that have rubber soles or low heels.  · Use mobility aids as needed, such as canes, walkers, scooters, and crutches.  · Review your medicines  with your health care provider. Some medicines can cause dizziness or changes in blood pressure, which increase your risk of falling.  Talk with your health care provider about other ways that you can decrease your risk of falls. This may include working with a physical therapist or  to improve your strength, balance, and endurance.  Where to find more information  · Centers for Disease Control and Prevention, [place]: https://www.cdc.gov  · National [place] on Aging: https://is9fgtm.merari.nih.gov  Contact a health care provider if:  · You are afraid of falling at home.  · You feel weak, drowsy, or dizzy at home.  · You fall at home.  Summary  · There are many simple things that you can do to make your home safe and to help prevent falls.  · Ways to make your home safe include removing tripping hazards and installing grab bars in the bathroom.  · Ask for help when making these changes in your home.  This information is not intended to replace advice given to you by your health care provider. Make sure you discuss any questions you have with your health care provider.  Document Released: 2003 Document Revised: 2018 Document Reviewed: 2018  "[employer] Interactive Patient Education © 2019 [employer] Inc.    Medicare Wellness  Personal Prevention Plan of Service     Date of Office Visit:  2019  Encounter Provider:  Titi Cottno MD  Place of Service:  [facility]  Patient Name: Hailey Dickerson  :  1940    As part of the Medicare Wellness portion of your visit today, we are providing you with this personalized preventive plan of services (PPPS). This plan is based upon recommendations of the [place] Preventive Services Task Force (USPSTF) and the Advisory Committee on Immunization Practices (ACIP).    This lists the preventive care services that should be considered, and provides dates of when you are due. Items listed as completed are  up-to-date and do not require any further intervention.    Health Maintenance   Topic Date Due   • MEDICARE ANNUAL WELLNESS  03/26/2020   • LIPID PANEL  03/26/2020   • TDAP/TD VACCINES (2 - Td) 12/18/2022   • COLONOSCOPY  12/04/2023   • INFLUENZA VACCINE  Completed   • PNEUMOCOCCAL VACCINES (65+ LOW/MEDIUM RISK)  Completed   • ZOSTER VACCINE  Discontinued       No orders of the defined types were placed in this encounter.      Return in 6 months (on 9/26/2019).

## 2019-03-26 NOTE — PROGRESS NOTES
Subsequent Medicare Wellness Visit   The ABC's of the Annual Wellness Visit    Chief Complaint   Patient presents with   • Medicare Wellness-subsequent     Fasting, With pap       HPI:  Hailey Dickerson, -1940, is a 78 y.o. female who presents for a Subsequent Medicare Wellness Visit.    Recent Hospitalizations:  No hospitalization(s) within the last year..    Current Medical Providers:  Patient Care Team:  Titi Cotton MD as PCP - General (Family Medicine)    Health Habits and Functional and Cognitive Screening and Depression Screening:  Functional & Cognitive Status 3/26/2019   Do you have difficulty preparing food and eating? No   Do you have difficulty bathing yourself, getting dressed or grooming yourself? No   Do you have difficulty using the toilet? No   Do you have difficulty moving around from place to place? No   Do you have trouble with steps or getting out of a bed or a chair? No   In the past year have you fallen or experienced a near fall? No   Current Diet Well Balanced Diet   Dental Exam Up to date   Eye Exam Up to date   Exercise (times per week) 0 times per week   Current Exercise Activities Include Housecleaning   Do you need help using the phone?  No   Are you deaf or do you have serious difficulty hearing?  No   Do you need help with transportation? No   Do you need help shopping? No   Do you need help preparing meals?  No   Do you need help with housework?  No   Do you need help with laundry? No   Do you need help taking your medications? No   Do you need help managing money? No   Do you ever drive or ride in a car without wearing a seat belt? No   Have you felt unusual stress, anger or loneliness in the last month? No   Who do you live with? Spouse   If you need help, do you have trouble finding someone available to you? No   Have you been bothered in the last four weeks by sexual problems? No   Do you have difficulty concentrating, remembering or making decisions? No        Compared to one year ago, the patient feels her physical health is the same and her mental health is the same.    Depression Screen:  PHQ-2/PHQ-9 Depression Screening 3/26/2019   Little interest or pleasure in doing things 0   Feeling down, depressed, or hopeless 0   Trouble falling or staying asleep, or sleeping too much -   Feeling tired or having little energy -   Poor appetite or overeating -   Feeling bad about yourself - or that you are a failure or have let yourself or your family down -   Trouble concentrating on things, such as reading the newspaper or watching television -   Moving or speaking so slowly that other people could have noticed. Or the opposite - being so fidgety or restless that you have been moving around a lot more than usual -   Thoughts that you would be better off dead, or of hurting yourself in some way -   Total Score 0   If you checked off any problems, how difficult have these problems made it for you to do your work, take care of things at home, or get along with other people? -         Past Medical/Family/Social History:  The following portions of the patient's history were reviewed and updated as appropriate: allergies, current medications, past family history, past medical history, past social history, past surgical history and problem list.    Allergies   Allergen Reactions   • Ciprocinonide [Fluocinolone] Rash   • Sulfa Antibiotics Rash         Current Outpatient Medications:   •  alendronate (FOSAMAX) 70 MG tablet, Take 1 tablet by mouth Every 7 (Seven) Days., Disp: 12 tablet, Rfl: 3  •  butalbital-acetaminophen-caffeine (FIORICET, ESGIC) -40 MG per tablet, One every 6 hours when necessary headache, Disp: 10 tablet, Rfl: 2  •  citalopram (CeleXA) 20 MG tablet, Take 1 tablet by mouth Daily., Disp: 90 tablet, Rfl: 3  •  Cranberry 125 MG tablet, Take  by mouth Daily., Disp: , Rfl:   •  estradiol (ESTRACE) 0.1 MG/GM vaginal cream, estradiol 0.01% (0.1 mg/gram) vaginal  cream  apply fingertip worth of cream to the urethra to nights per week at bedtime, Disp: , Rfl:   •  indapamide (LOZOL) 2.5 MG tablet, Take by mouth on Monday, Wednesday, and Friday, Disp: 12 tablet, Rfl: 3  •  lisinopril (PRINIVIL,ZESTRIL) 10 MG tablet, TAKE 1 TABLET BY MOUTH ONCE DAILY, Disp: 90 tablet, Rfl: 3  •  Multiple Vitamins-Minerals (MULTIVITAMIN WITH MINERALS) tablet tablet, Take 1 tablet by mouth Daily., Disp: , Rfl:   •  raNITIdine (ZANTAC) 300 MG tablet, Take 1 tablet by mouth Every Night., Disp: 90 tablet, Rfl: 3  •  simvastatin (ZOCOR) 20 MG tablet, Take 1 tablet by mouth Every Evening., Disp: 90 tablet, Rfl: 3    Aspirin use counseling: Does not need ASA (and currently is not on it)    Current medication list contains no high risk medications.  No harmful drug interactions have been identified.     Family History   Problem Relation Age of Onset   • Heart attack Mother    • Cancer Father         Lung   • No Known Problems Maternal Grandmother    • No Known Problems Maternal Grandfather    • No Known Problems Paternal Grandmother    • No Known Problems Paternal Grandfather        Social History     Tobacco Use   • Smoking status: Never Smoker   • Smokeless tobacco: Never Used   Substance Use Topics   • Alcohol use: No       Past Surgical History:   Procedure Laterality Date   • ANKLE SURGERY     • CHOLECYSTECTOMY         Patient Active Problem List   Diagnosis   • Essential hypertension   • Anxiety   • GERD (gastroesophageal reflux disease)       Review of Systems   Cardiovascular: Negative for chest pain and leg swelling.        Referred to cardiologist   Genitourinary: Negative for difficulty urinating.   Neurological:        Information given about memory preservation   All other systems reviewed and are negative.      Objective     Vitals:    03/26/19 0837   BP: 122/88   BP Location: Left arm   Patient Position: Sitting   Cuff Size: Large Adult   Pulse: 77   Temp: 98 °F (36.7 °C)   SpO2: 97%  "  Weight: 88.5 kg (195 lb 3.2 oz)   Height: 162.6 cm (64\")   PainSc: 0-No pain       Patient's Body mass index is 33.51 kg/m². BMI is above normal parameters. Recommendations include: educational material and exercise counseling.      No exam data present    The patient has no evidence of cognitve impairment.     Physical Exam   Constitutional: She is oriented to person, place, and time.   Obesity   HENT:   Right Ear: External ear normal.   Left Ear: External ear normal.   Mouth/Throat: Oropharynx is clear and moist.   Eyes: EOM are normal. Pupils are equal, round, and reactive to light.   Neck: No thyromegaly present.   Good carotid pulses   Cardiovascular: Normal rate and regular rhythm.   Pulmonary/Chest: Effort normal and breath sounds normal.   Breasts no masses noted   Abdominal: Soft. Bowel sounds are normal.   Genitourinary: Vagina normal and uterus normal.   Genitourinary Comments: Cervix present Pap smear taken uterus felt to be normal size no adnexal masses   Musculoskeletal: She exhibits no edema.   Lymphadenopathy:     She has no cervical adenopathy.   Neurological: She is alert and oriented to person, place, and time.   Skin: Skin is warm and dry. Capillary refill takes less than 2 seconds.   Psychiatric: She has a normal mood and affect. Her behavior is normal. Judgment and thought content normal.   Nursing note and vitals reviewed.      Recent Lab Results:  Lab Results   Component Value Date    GLU 97 10/15/2018     Lab Results   Component Value Date    TRIG 286 (H) 09/25/2018    HDL 41 (L) 09/25/2018    VLDL 57.2 09/25/2018       Assessment/Plan   Age-appropriate Screening Schedule:  Refer to the list below for future screening recommendations based on patient's age, sex and/or medical conditions.      Health Maintenance   Topic Date Due   • LIPID PANEL  03/26/2020   • TDAP/TD VACCINES (2 - Td) 12/18/2022   • COLONOSCOPY  12/04/2023   • INFLUENZA VACCINE  Completed   • PNEUMOCOCCAL VACCINES (65+ " LOW/MEDIUM RISK)  Completed   • ZOSTER VACCINE  Discontinued       Medicare Risks and Personalized Health Plan:  Colon Cancer screening is due;  Cologuard Ordered  Mammogram Screening is due; Screening Mammogram ordered  Obesity/Overweight condition;  Diet information included in the after visit summary (AVS) and Exercise prescription included in the after visit summary (AVS)  Inactivity/Poor Exercise Habits-patient reported; Inactivity; Follow Exercise Information (additional patient information in the After Visit Summary)      CMS-Preventive Services Quick Reference  Medicare Preventive Services Addressed:  Annual Wellness Visit (AWV)  Cardiovascular Disease Screening Tests (may do this order every 5 years in beneficiaries without signs or symptoms of cardiovascular disease)  Colorectal Cancer Screening, Cologuard Test   Screening Mammography   Screening Pap Tests  Screening Pelvic Examinations (Includes a clinical breast examination)    Advance Care Planning:  Patient does not have an advance directive - information provided to the patient today    Diagnoses and all orders for this visit:    1. Fatigue, unspecified type (Primary)  -     TSH  -     T4, free  -     CBC & Differential    2. Mixed hyperlipidemia  -     Comprehensive Metabolic Panel  -     Lipid Panel  -     Ambulatory Referral to Cardiology    3. Vitamin D deficiency  -     Vitamin D 25 Hydroxy    4. Hypertension, unspecified type  -     POC Urinalysis Dipstick, Multipro  -     Ambulatory Referral to Cardiology    5. Screening for colorectal cancer  -     Cologuard - Stool, Per Rectum; Future    6. Screening for malignant neoplasm of breast  -     Mammo Screening Bilateral With CAD; Future    7. Annual physical exam        An After Visit Summary and PPPS with all of these plans were given to the patient.      Follow Up:  Return in 6 months (on 9/26/2019).            Plan-above-encouraged exercise stationary bike information given about memory  preservation

## 2019-03-27 LAB
25(OH)D3+25(OH)D2 SERPL-MCNC: 37.4 NG/ML (ref 30–100)
ALBUMIN SERPL-MCNC: 4.4 G/DL (ref 3.5–5.2)
ALBUMIN/GLOB SERPL: 1.9 G/DL
ALP SERPL-CCNC: 48 U/L (ref 39–117)
ALT SERPL-CCNC: 14 U/L (ref 1–33)
AST SERPL-CCNC: 19 U/L (ref 1–32)
BASOPHILS # BLD AUTO: 0.05 10*3/MM3 (ref 0–0.2)
BASOPHILS NFR BLD AUTO: 0.8 % (ref 0–1.5)
BILIRUB SERPL-MCNC: 0.3 MG/DL (ref 0.2–1.2)
BUN SERPL-MCNC: 17 MG/DL (ref 8–23)
BUN/CREAT SERPL: 17.9 (ref 7–25)
CALCIUM SERPL-MCNC: 9.3 MG/DL (ref 8.6–10.5)
CHLORIDE SERPL-SCNC: 98 MMOL/L (ref 98–107)
CHOLEST SERPL-MCNC: 173 MG/DL (ref 0–200)
CO2 SERPL-SCNC: 24.5 MMOL/L (ref 22–29)
CREAT SERPL-MCNC: 0.95 MG/DL (ref 0.57–1)
CYTOLOGIST CVX/VAG CYTO: NORMAL
CYTOLOGY CVX/VAG DOC THIN PREP: NORMAL
DX ICD CODE: NORMAL
EOSINOPHIL # BLD AUTO: 0.07 10*3/MM3 (ref 0–0.4)
EOSINOPHIL NFR BLD AUTO: 1.2 % (ref 0.3–6.2)
ERYTHROCYTE [DISTWIDTH] IN BLOOD BY AUTOMATED COUNT: 11.9 % (ref 12.3–15.4)
GLOBULIN SER CALC-MCNC: 2.3 GM/DL
GLUCOSE SERPL-MCNC: 96 MG/DL (ref 65–99)
HCT VFR BLD AUTO: 44.1 % (ref 34–46.6)
HDLC SERPL-MCNC: 43 MG/DL (ref 40–60)
HGB BLD-MCNC: 14 G/DL (ref 12–15.9)
HIV 1 & 2 AB SER-IMP: NORMAL
IMM GRANULOCYTES # BLD AUTO: 0.02 10*3/MM3 (ref 0–0.05)
IMM GRANULOCYTES NFR BLD AUTO: 0.3 % (ref 0–0.5)
LDLC SERPL CALC-MCNC: 86 MG/DL (ref 0–100)
LYMPHOCYTES # BLD AUTO: 1.89 10*3/MM3 (ref 0.7–3.1)
LYMPHOCYTES NFR BLD AUTO: 31.4 % (ref 19.6–45.3)
MCH RBC QN AUTO: 32.2 PG (ref 26.6–33)
MCHC RBC AUTO-ENTMCNC: 31.7 G/DL (ref 31.5–35.7)
MCV RBC AUTO: 101.4 FL (ref 79–97)
MONOCYTES # BLD AUTO: 0.73 10*3/MM3 (ref 0.1–0.9)
MONOCYTES NFR BLD AUTO: 12.1 % (ref 5–12)
NEUTROPHILS # BLD AUTO: 3.25 10*3/MM3 (ref 1.4–7)
NEUTROPHILS NFR BLD AUTO: 54.2 % (ref 42.7–76)
NRBC BLD AUTO-RTO: 0 /100 WBC (ref 0–0)
OTHER STN SPEC: NORMAL
PATH REPORT.FINAL DX SPEC: NORMAL
PLATELET # BLD AUTO: 256 10*3/MM3 (ref 140–450)
POTASSIUM SERPL-SCNC: 3.8 MMOL/L (ref 3.5–5.2)
PROT SERPL-MCNC: 6.7 G/DL (ref 6–8.5)
RBC # BLD AUTO: 4.35 10*6/MM3 (ref 3.77–5.28)
SODIUM SERPL-SCNC: 138 MMOL/L (ref 136–145)
STAT OF ADQ CVX/VAG CYTO-IMP: NORMAL
T4 FREE SERPL-MCNC: 1.03 NG/DL (ref 0.93–1.7)
TRIGL SERPL-MCNC: 221 MG/DL (ref 0–150)
TSH SERPL DL<=0.005 MIU/L-ACNC: 2.73 MIU/ML (ref 0.27–4.2)
VLDLC SERPL CALC-MCNC: 44.2 MG/DL (ref 5–40)
WBC # BLD AUTO: 6.01 10*3/MM3 (ref 3.4–10.8)

## 2019-04-15 RX ORDER — CITALOPRAM 20 MG/1
TABLET ORAL
Qty: 90 TABLET | Refills: 3 | Status: ON HOLD | OUTPATIENT
Start: 2019-04-15 | End: 2019-11-07

## 2019-04-29 RX ORDER — RANITIDINE 300 MG/1
TABLET ORAL
Qty: 90 TABLET | Refills: 3 | Status: ON HOLD | OUTPATIENT
Start: 2019-04-29 | End: 2019-11-07

## 2019-06-24 RX ORDER — SIMVASTATIN 20 MG
TABLET ORAL
Qty: 90 TABLET | Refills: 2 | Status: ON HOLD | OUTPATIENT
Start: 2019-06-24 | End: 2019-11-07

## 2019-07-30 RX ORDER — LISINOPRIL 10 MG/1
TABLET ORAL
Qty: 90 TABLET | Refills: 2 | Status: ON HOLD | OUTPATIENT
Start: 2019-07-30 | End: 2019-11-07

## 2019-08-07 RX ORDER — INDAPAMIDE 2.5 MG/1
TABLET, FILM COATED ORAL
Qty: 12 TABLET | Refills: 2 | Status: SHIPPED | OUTPATIENT
Start: 2019-08-07 | End: 2019-11-03 | Stop reason: SDUPTHER

## 2019-09-26 ENCOUNTER — OFFICE VISIT (OUTPATIENT)
Dept: FAMILY MEDICINE CLINIC | Facility: CLINIC | Age: 79
End: 2019-09-26

## 2019-09-26 VITALS
SYSTOLIC BLOOD PRESSURE: 122 MMHG | TEMPERATURE: 97.5 F | OXYGEN SATURATION: 98 % | WEIGHT: 195.2 LBS | HEIGHT: 64 IN | BODY MASS INDEX: 33.32 KG/M2 | DIASTOLIC BLOOD PRESSURE: 80 MMHG | HEART RATE: 73 BPM

## 2019-09-26 DIAGNOSIS — E78.2 MIXED HYPERLIPIDEMIA: Primary | ICD-10-CM

## 2019-09-26 DIAGNOSIS — Z23 NEED FOR INFLUENZA VACCINATION: ICD-10-CM

## 2019-09-26 PROCEDURE — 90653 IIV ADJUVANT VACCINE IM: CPT | Performed by: FAMILY MEDICINE

## 2019-09-26 PROCEDURE — 99213 OFFICE O/P EST LOW 20 MIN: CPT | Performed by: FAMILY MEDICINE

## 2019-09-26 PROCEDURE — G0008 ADMIN INFLUENZA VIRUS VAC: HCPCS | Performed by: FAMILY MEDICINE

## 2019-09-26 NOTE — PROGRESS NOTES
Subjective   Hailey Dickerson is a 79 y.o. female.     79-year-old with hyperlipidemia hypertension DJD getting ready for knee replacement      Hyperlipidemia   This is a chronic problem. The current episode started more than 1 year ago. The problem is controlled. Exacerbating diseases include obesity. There are no known factors aggravating her hyperlipidemia. Pertinent negatives include no chest pain or myalgias. Current antihyperlipidemic treatment includes diet change and statins. The current treatment provides moderate improvement of lipids. Compliance problems include adherence to diet and adherence to exercise.  Risk factors for coronary artery disease include dyslipidemia, hypertension, obesity, post-menopausal and a sedentary lifestyle.       The following portions of the patient's history were reviewed and updated as appropriate: allergies, current medications, past family history, past medical history, past social history, past surgical history and problem list.    Review of Systems   Cardiovascular: Negative for chest pain and leg swelling.   Gastrointestinal:        Occasional diarrhea when she eats-take Imodium   Genitourinary: Positive for difficulty urinating.   Musculoskeletal: Positive for arthralgias. Negative for myalgias.        Knee replacement surgery coming up       Objective   Physical Exam   Constitutional: She is oriented to person, place, and time.   Obesity   Cardiovascular: Normal rate and regular rhythm.   Pulmonary/Chest: Effort normal and breath sounds normal.   Musculoskeletal: She exhibits no edema.   Neurological: She is alert and oriented to person, place, and time.   Psychiatric: She has a normal mood and affect. Her behavior is normal. Judgment and thought content normal.   Nursing note and vitals reviewed.      Assessment/Plan   Patient Active Problem List   Diagnosis   • Essential hypertension   • Anxiety   • GERD (gastroesophageal reflux disease)     Hailey was seen today for  follow-up.    Diagnoses and all orders for this visit:    Mixed hyperlipidemia  -     Comprehensive Metabolic Panel  -     Lipid Panel    Need for influenza vaccination  -     Fluad Quad >65 years       Return if symptoms worsen or fail to improve, for Annual physical.         Plan-above    Electronically signed by Titi Cotton MD 09/26/2019

## 2019-09-27 LAB
ALBUMIN SERPL-MCNC: 4.6 G/DL (ref 3.5–5.2)
ALBUMIN/GLOB SERPL: 1.9 G/DL
ALP SERPL-CCNC: 52 U/L (ref 39–117)
ALT SERPL-CCNC: 12 U/L (ref 1–33)
AST SERPL-CCNC: 16 U/L (ref 1–32)
BILIRUB SERPL-MCNC: 0.4 MG/DL (ref 0.2–1.2)
BUN SERPL-MCNC: 18 MG/DL (ref 8–23)
BUN/CREAT SERPL: 17.8 (ref 7–25)
CALCIUM SERPL-MCNC: 9.2 MG/DL (ref 8.6–10.5)
CHLORIDE SERPL-SCNC: 101 MMOL/L (ref 98–107)
CHOLEST SERPL-MCNC: 195 MG/DL (ref 0–200)
CO2 SERPL-SCNC: 26.4 MMOL/L (ref 22–29)
CREAT SERPL-MCNC: 1.01 MG/DL (ref 0.57–1)
GLOBULIN SER CALC-MCNC: 2.4 GM/DL
GLUCOSE SERPL-MCNC: 109 MG/DL (ref 65–99)
HDLC SERPL-MCNC: 50 MG/DL (ref 40–60)
LDLC SERPL CALC-MCNC: 105 MG/DL (ref 0–100)
POTASSIUM SERPL-SCNC: 4.4 MMOL/L (ref 3.5–5.2)
PROT SERPL-MCNC: 7 G/DL (ref 6–8.5)
SODIUM SERPL-SCNC: 140 MMOL/L (ref 136–145)
TRIGL SERPL-MCNC: 198 MG/DL (ref 0–150)
VLDLC SERPL CALC-MCNC: 39.6 MG/DL

## 2019-10-29 ENCOUNTER — HOSPITAL ENCOUNTER (OUTPATIENT)
Dept: GENERAL RADIOLOGY | Facility: HOSPITAL | Age: 79
Discharge: HOME | End: 2019-10-29
Admitting: ORTHOPAEDIC SURGERY

## 2019-10-29 ENCOUNTER — APPOINTMENT (OUTPATIENT)
Dept: PREADMISSION TESTING | Facility: HOSPITAL | Age: 79
End: 2019-10-29

## 2019-10-29 VITALS
OXYGEN SATURATION: 97 % | BODY MASS INDEX: 34.45 KG/M2 | HEART RATE: 66 BPM | WEIGHT: 194.45 LBS | RESPIRATION RATE: 18 BRPM | DIASTOLIC BLOOD PRESSURE: 59 MMHG | SYSTOLIC BLOOD PRESSURE: 134 MMHG | HEIGHT: 63 IN

## 2019-10-29 LAB
ALBUMIN SERPL-MCNC: 4.5 G/DL (ref 3.5–5.2)
ALBUMIN/GLOB SERPL: 1.6 G/DL
ALP SERPL-CCNC: 46 U/L (ref 39–117)
ALT SERPL W P-5'-P-CCNC: 11 U/L (ref 1–33)
ANION GAP SERPL CALCULATED.3IONS-SCNC: 14 MMOL/L (ref 5–15)
AST SERPL-CCNC: 18 U/L (ref 1–32)
BACTERIA UR QL AUTO: ABNORMAL /HPF
BILIRUB SERPL-MCNC: 0.3 MG/DL (ref 0.2–1.2)
BILIRUB UR QL STRIP: NEGATIVE
BUN BLD-MCNC: 26 MG/DL (ref 8–23)
BUN/CREAT SERPL: 26.5 (ref 7–25)
CALCIUM SPEC-SCNC: 9.3 MG/DL (ref 8.6–10.5)
CHLORIDE SERPL-SCNC: 101 MMOL/L (ref 98–107)
CLARITY UR: CLEAR
CO2 SERPL-SCNC: 27 MMOL/L (ref 22–29)
COLOR UR: YELLOW
CREAT BLD-MCNC: 0.98 MG/DL (ref 0.57–1)
DEPRECATED RDW RBC AUTO: 38.6 FL (ref 37–54)
ERYTHROCYTE [DISTWIDTH] IN BLOOD BY AUTOMATED COUNT: 11.5 % (ref 12.3–15.4)
GFR SERPL CREATININE-BSD FRML MDRD: 55 ML/MIN/1.73
GLOBULIN UR ELPH-MCNC: 2.8 GM/DL
GLUCOSE BLD-MCNC: 100 MG/DL (ref 65–99)
GLUCOSE UR STRIP-MCNC: NEGATIVE MG/DL
HCT VFR BLD AUTO: 41.6 % (ref 34–46.6)
HGB BLD-MCNC: 14.4 G/DL (ref 12–15.9)
HGB UR QL STRIP.AUTO: NEGATIVE
HYALINE CASTS UR QL AUTO: ABNORMAL /LPF
INR PPP: 0.86 (ref 0.91–1.09)
KETONES UR QL STRIP: NEGATIVE
LEUKOCYTE ESTERASE UR QL STRIP.AUTO: ABNORMAL
MCH RBC QN AUTO: 32.1 PG (ref 26.6–33)
MCHC RBC AUTO-ENTMCNC: 34.6 G/DL (ref 31.5–35.7)
MCV RBC AUTO: 92.9 FL (ref 79–97)
NITRITE UR QL STRIP: NEGATIVE
PH UR STRIP.AUTO: 5.5 [PH] (ref 5–8)
PLATELET # BLD AUTO: 243 10*3/MM3 (ref 140–450)
PMV BLD AUTO: 10 FL (ref 6–12)
POTASSIUM BLD-SCNC: 4.1 MMOL/L (ref 3.5–5.2)
PROT SERPL-MCNC: 7.3 G/DL (ref 6–8.5)
PROT UR QL STRIP: NEGATIVE
PROTHROMBIN TIME: 12 SECONDS (ref 11.9–14.6)
RBC # BLD AUTO: 4.48 10*6/MM3 (ref 3.77–5.28)
RBC # UR: ABNORMAL /HPF
REF LAB TEST METHOD: ABNORMAL
SODIUM BLD-SCNC: 142 MMOL/L (ref 136–145)
SP GR UR STRIP: 1.02 (ref 1–1.03)
SQUAMOUS #/AREA URNS HPF: ABNORMAL /HPF
UROBILINOGEN UR QL STRIP: ABNORMAL
WBC NRBC COR # BLD: 5.48 10*3/MM3 (ref 3.4–10.8)
WBC UR QL AUTO: ABNORMAL /HPF

## 2019-10-29 PROCEDURE — 87086 URINE CULTURE/COLONY COUNT: CPT | Performed by: ORTHOPAEDIC SURGERY

## 2019-10-29 PROCEDURE — 93005 ELECTROCARDIOGRAM TRACING: CPT

## 2019-10-29 PROCEDURE — 81001 URINALYSIS AUTO W/SCOPE: CPT | Performed by: ORTHOPAEDIC SURGERY

## 2019-10-29 PROCEDURE — 85027 COMPLETE CBC AUTOMATED: CPT | Performed by: ORTHOPAEDIC SURGERY

## 2019-10-29 PROCEDURE — 36415 COLL VENOUS BLD VENIPUNCTURE: CPT

## 2019-10-29 PROCEDURE — 85610 PROTHROMBIN TIME: CPT | Performed by: ORTHOPAEDIC SURGERY

## 2019-10-29 PROCEDURE — 80053 COMPREHEN METABOLIC PANEL: CPT | Performed by: ORTHOPAEDIC SURGERY

## 2019-10-29 PROCEDURE — 93010 ELECTROCARDIOGRAM REPORT: CPT | Performed by: INTERNAL MEDICINE

## 2019-10-29 PROCEDURE — 71046 X-RAY EXAM CHEST 2 VIEWS: CPT

## 2019-10-29 ASSESSMENT — KOOS JR
KOOS JR SCORE: 47.487
KOOS JR SCORE: 16

## 2019-10-30 LAB — BACTERIA SPEC AEROBE CULT: NORMAL

## 2019-11-04 RX ORDER — INDAPAMIDE 2.5 MG/1
TABLET, FILM COATED ORAL
Qty: 36 TABLET | Refills: 1 | Status: ON HOLD | OUTPATIENT
Start: 2019-11-04 | End: 2019-11-07

## 2019-11-06 ENCOUNTER — ANESTHESIA EVENT (OUTPATIENT)
Dept: PERIOP | Facility: HOSPITAL | Age: 79
End: 2019-11-06

## 2019-11-06 ENCOUNTER — ANESTHESIA (OUTPATIENT)
Dept: PERIOP | Facility: HOSPITAL | Age: 79
End: 2019-11-06

## 2019-11-06 ENCOUNTER — HOSPITAL ENCOUNTER (INPATIENT)
Facility: HOSPITAL | Age: 79
LOS: 4 days | Discharge: HOME HEALTH | End: 2019-11-10
Attending: ORTHOPAEDIC SURGERY | Admitting: ORTHOPAEDIC SURGERY

## 2019-11-06 DIAGNOSIS — Z74.09 IMPAIRED MOBILITY AND ADLS: ICD-10-CM

## 2019-11-06 DIAGNOSIS — Z78.9 IMPAIRED MOBILITY AND ADLS: ICD-10-CM

## 2019-11-06 DIAGNOSIS — Z74.09 IMPAIRED MOBILITY: ICD-10-CM

## 2019-11-06 DIAGNOSIS — M17.12 PRIMARY OSTEOARTHRITIS OF LEFT KNEE: Primary | ICD-10-CM

## 2019-11-06 LAB
ABO GROUP BLD: NORMAL
BLD GP AB SCN SERPL QL: NEGATIVE
RH BLD: NEGATIVE
T&S EXPIRATION DATE: NORMAL

## 2019-11-06 PROCEDURE — 25010000002 ONDANSETRON PER 1 MG: Performed by: ANESTHESIOLOGY

## 2019-11-06 PROCEDURE — 25010000002 VANCOMYCIN 1 G RECONSTITUTED SOLUTION 1 EACH VIAL: Performed by: ORTHOPAEDIC SURGERY

## 2019-11-06 PROCEDURE — 86901 BLOOD TYPING SEROLOGIC RH(D): CPT | Performed by: ORTHOPAEDIC SURGERY

## 2019-11-06 PROCEDURE — 25010000002 ONDANSETRON PER 1 MG: Performed by: ORTHOPAEDIC SURGERY

## 2019-11-06 PROCEDURE — 25010000002 HYDROMORPHONE PER 4 MG: Performed by: NURSE ANESTHETIST, CERTIFIED REGISTERED

## 2019-11-06 PROCEDURE — 0SRD0J9 REPLACEMENT OF LEFT KNEE JOINT WITH SYNTHETIC SUBSTITUTE, CEMENTED, OPEN APPROACH: ICD-10-PCS | Performed by: ORTHOPAEDIC SURGERY

## 2019-11-06 PROCEDURE — 25010000002 MORPHINE (PF) 10 MG/ML SOLUTION: Performed by: ORTHOPAEDIC SURGERY

## 2019-11-06 PROCEDURE — 25010000002 DEXAMETHASONE PER 1 MG: Performed by: NURSE ANESTHETIST, CERTIFIED REGISTERED

## 2019-11-06 PROCEDURE — 25010000002 FENTANYL CITRATE (PF) 100 MCG/2ML SOLUTION: Performed by: ANESTHESIOLOGY

## 2019-11-06 PROCEDURE — C1776 JOINT DEVICE (IMPLANTABLE): HCPCS | Performed by: ORTHOPAEDIC SURGERY

## 2019-11-06 PROCEDURE — 86850 RBC ANTIBODY SCREEN: CPT | Performed by: ORTHOPAEDIC SURGERY

## 2019-11-06 PROCEDURE — 25010000002 ROPIVACAINE PER 1 MG: Performed by: NURSE ANESTHETIST, CERTIFIED REGISTERED

## 2019-11-06 PROCEDURE — 25010000002 FENTANYL CITRATE (PF) 250 MCG/5ML SOLUTION: Performed by: NURSE ANESTHETIST, CERTIFIED REGISTERED

## 2019-11-06 PROCEDURE — 25010000002 ONDANSETRON PER 1 MG: Performed by: NURSE ANESTHETIST, CERTIFIED REGISTERED

## 2019-11-06 PROCEDURE — 25010000002 CEFAZOLIN PER 500 MG: Performed by: ORTHOPAEDIC SURGERY

## 2019-11-06 PROCEDURE — 86900 BLOOD TYPING SEROLOGIC ABO: CPT | Performed by: ORTHOPAEDIC SURGERY

## 2019-11-06 PROCEDURE — 25010000002 PROPOFOL 10 MG/ML EMULSION: Performed by: NURSE ANESTHETIST, CERTIFIED REGISTERED

## 2019-11-06 PROCEDURE — C1713 ANCHOR/SCREW BN/BN,TIS/BN: HCPCS | Performed by: ORTHOPAEDIC SURGERY

## 2019-11-06 PROCEDURE — 25010000002 MORPHINE PER 10 MG: Performed by: ANESTHESIOLOGY

## 2019-11-06 PROCEDURE — 25010000002 METOCLOPRAMIDE PER 10 MG: Performed by: ANESTHESIOLOGY

## 2019-11-06 DEVICE — CMT BONE SIMPLEX/P FULL DOSE 10/PK: Type: IMPLANTABLE DEVICE | Status: FUNCTIONAL

## 2019-11-06 DEVICE — INSRT TIB/KN TRIATHLON PS X3 NMBR3 9MM: Type: IMPLANTABLE DEVICE | Status: FUNCTIONAL

## 2019-11-06 DEVICE — IMPLANTABLE DEVICE: Type: IMPLANTABLE DEVICE | Status: FUNCTIONAL

## 2019-11-06 DEVICE — COMP FEM TRIATH PS CMT NO4 LT: Type: IMPLANTABLE DEVICE | Status: FUNCTIONAL

## 2019-11-06 DEVICE — TOTL KN HI DEMAND STRYKER: Type: IMPLANTABLE DEVICE | Status: FUNCTIONAL

## 2019-11-06 DEVICE — BASEPLT TIB TRIATH TS NO3: Type: IMPLANTABLE DEVICE | Status: FUNCTIONAL

## 2019-11-06 RX ORDER — FENTANYL CITRATE 50 UG/ML
25 INJECTION, SOLUTION INTRAMUSCULAR; INTRAVENOUS AS NEEDED
Status: DISCONTINUED | OUTPATIENT
Start: 2019-11-06 | End: 2019-11-06 | Stop reason: HOSPADM

## 2019-11-06 RX ORDER — LIDOCAINE HYDROCHLORIDE 40 MG/ML
SOLUTION TOPICAL AS NEEDED
Status: DISCONTINUED | OUTPATIENT
Start: 2019-11-06 | End: 2019-11-06 | Stop reason: SURG

## 2019-11-06 RX ORDER — FENTANYL CITRATE 50 UG/ML
INJECTION, SOLUTION INTRAMUSCULAR; INTRAVENOUS AS NEEDED
Status: DISCONTINUED | OUTPATIENT
Start: 2019-11-06 | End: 2019-11-06 | Stop reason: SURG

## 2019-11-06 RX ORDER — NALOXONE HCL 0.4 MG/ML
0.04 VIAL (ML) INJECTION AS NEEDED
Status: DISCONTINUED | OUTPATIENT
Start: 2019-11-06 | End: 2019-11-06 | Stop reason: HOSPADM

## 2019-11-06 RX ORDER — ONDANSETRON 2 MG/ML
4 INJECTION INTRAMUSCULAR; INTRAVENOUS EVERY 6 HOURS PRN
Status: DISCONTINUED | OUTPATIENT
Start: 2019-11-06 | End: 2019-11-10 | Stop reason: HOSPADM

## 2019-11-06 RX ORDER — SODIUM CHLORIDE 0.9 % (FLUSH) 0.9 %
3 SYRINGE (ML) INJECTION EVERY 12 HOURS SCHEDULED
Status: DISCONTINUED | OUTPATIENT
Start: 2019-11-06 | End: 2019-11-06 | Stop reason: HOSPADM

## 2019-11-06 RX ORDER — FLUMAZENIL 0.1 MG/ML
0.2 INJECTION INTRAVENOUS AS NEEDED
Status: DISCONTINUED | OUTPATIENT
Start: 2019-11-06 | End: 2019-11-06 | Stop reason: HOSPADM

## 2019-11-06 RX ORDER — MAGNESIUM HYDROXIDE 1200 MG/15ML
LIQUID ORAL AS NEEDED
Status: DISCONTINUED | OUTPATIENT
Start: 2019-11-06 | End: 2019-11-06 | Stop reason: HOSPADM

## 2019-11-06 RX ORDER — ACETAMINOPHEN 500 MG
1000 TABLET ORAL ONCE
Status: COMPLETED | OUTPATIENT
Start: 2019-11-06 | End: 2019-11-06

## 2019-11-06 RX ORDER — METOCLOPRAMIDE HYDROCHLORIDE 5 MG/ML
5 INJECTION INTRAMUSCULAR; INTRAVENOUS
Status: DISCONTINUED | OUTPATIENT
Start: 2019-11-06 | End: 2019-11-06 | Stop reason: HOSPADM

## 2019-11-06 RX ORDER — LABETALOL HYDROCHLORIDE 5 MG/ML
5 INJECTION, SOLUTION INTRAVENOUS
Status: DISCONTINUED | OUTPATIENT
Start: 2019-11-06 | End: 2019-11-06 | Stop reason: HOSPADM

## 2019-11-06 RX ORDER — HYDROCODONE BITARTRATE AND ACETAMINOPHEN 7.5; 325 MG/1; MG/1
1 TABLET ORAL EVERY 4 HOURS PRN
Status: DISCONTINUED | OUTPATIENT
Start: 2019-11-06 | End: 2019-11-10 | Stop reason: HOSPADM

## 2019-11-06 RX ORDER — ROCURONIUM BROMIDE 10 MG/ML
INJECTION, SOLUTION INTRAVENOUS AS NEEDED
Status: DISCONTINUED | OUTPATIENT
Start: 2019-11-06 | End: 2019-11-06 | Stop reason: SURG

## 2019-11-06 RX ORDER — IPRATROPIUM BROMIDE AND ALBUTEROL SULFATE 2.5; .5 MG/3ML; MG/3ML
3 SOLUTION RESPIRATORY (INHALATION) ONCE AS NEEDED
Status: DISCONTINUED | OUTPATIENT
Start: 2019-11-06 | End: 2019-11-06 | Stop reason: HOSPADM

## 2019-11-06 RX ORDER — SODIUM CHLORIDE 0.9 % (FLUSH) 0.9 %
3-10 SYRINGE (ML) INJECTION AS NEEDED
Status: DISCONTINUED | OUTPATIENT
Start: 2019-11-06 | End: 2019-11-06 | Stop reason: HOSPADM

## 2019-11-06 RX ORDER — LISINOPRIL 10 MG/1
10 TABLET ORAL DAILY
Status: DISCONTINUED | OUTPATIENT
Start: 2019-11-06 | End: 2019-11-10 | Stop reason: HOSPADM

## 2019-11-06 RX ORDER — FAMOTIDINE 20 MG/1
20 TABLET, FILM COATED ORAL DAILY
Status: DISCONTINUED | OUTPATIENT
Start: 2019-11-06 | End: 2019-11-10 | Stop reason: HOSPADM

## 2019-11-06 RX ORDER — NALOXONE HCL 0.4 MG/ML
0.1 VIAL (ML) INJECTION
Status: DISCONTINUED | OUTPATIENT
Start: 2019-11-06 | End: 2019-11-10 | Stop reason: HOSPADM

## 2019-11-06 RX ORDER — METOCLOPRAMIDE HYDROCHLORIDE 5 MG/ML
5 INJECTION INTRAMUSCULAR; INTRAVENOUS ONCE AS NEEDED
Status: COMPLETED | OUTPATIENT
Start: 2019-11-06 | End: 2019-11-06

## 2019-11-06 RX ORDER — ONDANSETRON 2 MG/ML
4 INJECTION INTRAMUSCULAR; INTRAVENOUS AS NEEDED
Status: DISCONTINUED | OUTPATIENT
Start: 2019-11-06 | End: 2019-11-06 | Stop reason: HOSPADM

## 2019-11-06 RX ORDER — CEFAZOLIN SODIUM 2 G/50ML
2 SOLUTION INTRAVENOUS EVERY 8 HOURS
Status: DISCONTINUED | OUTPATIENT
Start: 2019-11-06 | End: 2019-11-06

## 2019-11-06 RX ORDER — MORPHINE SULFATE 1 MG/ML
INJECTION INTRAVENOUS CONTINUOUS
Status: DISCONTINUED | OUTPATIENT
Start: 2019-11-06 | End: 2019-11-07

## 2019-11-06 RX ORDER — PROPOFOL 10 MG/ML
VIAL (ML) INTRAVENOUS AS NEEDED
Status: DISCONTINUED | OUTPATIENT
Start: 2019-11-06 | End: 2019-11-06 | Stop reason: SURG

## 2019-11-06 RX ORDER — BUPIVACAINE HCL/0.9 % NACL/PF 0.1 %
2 PLASTIC BAG, INJECTION (ML) EPIDURAL EVERY 8 HOURS
Status: COMPLETED | OUTPATIENT
Start: 2019-11-06 | End: 2019-11-07

## 2019-11-06 RX ORDER — ROPIVACAINE HYDROCHLORIDE 5 MG/ML
INJECTION, SOLUTION EPIDURAL; INFILTRATION; PERINEURAL
Status: COMPLETED | OUTPATIENT
Start: 2019-11-06 | End: 2019-11-06

## 2019-11-06 RX ORDER — BUPIVACAINE HCL/0.9 % NACL/PF 0.1 %
2 PLASTIC BAG, INJECTION (ML) EPIDURAL ONCE
Status: COMPLETED | OUTPATIENT
Start: 2019-11-06 | End: 2019-11-06

## 2019-11-06 RX ORDER — LIDOCAINE HYDROCHLORIDE 20 MG/ML
INJECTION, SOLUTION INFILTRATION; PERINEURAL AS NEEDED
Status: DISCONTINUED | OUTPATIENT
Start: 2019-11-06 | End: 2019-11-06 | Stop reason: SURG

## 2019-11-06 RX ORDER — SODIUM CHLORIDE, SODIUM LACTATE, POTASSIUM CHLORIDE, CALCIUM CHLORIDE 600; 310; 30; 20 MG/100ML; MG/100ML; MG/100ML; MG/100ML
100 INJECTION, SOLUTION INTRAVENOUS CONTINUOUS PRN
Status: DISCONTINUED | OUTPATIENT
Start: 2019-11-06 | End: 2019-11-06 | Stop reason: HOSPADM

## 2019-11-06 RX ORDER — FENTANYL CITRATE 50 UG/ML
100 INJECTION, SOLUTION INTRAMUSCULAR; INTRAVENOUS ONCE
Status: COMPLETED | OUTPATIENT
Start: 2019-11-06 | End: 2019-11-06

## 2019-11-06 RX ORDER — IBUPROFEN 600 MG/1
600 TABLET ORAL ONCE AS NEEDED
Status: COMPLETED | OUTPATIENT
Start: 2019-11-06 | End: 2019-11-06

## 2019-11-06 RX ORDER — HYDROMORPHONE HCL 110MG/55ML
PATIENT CONTROLLED ANALGESIA SYRINGE INTRAVENOUS AS NEEDED
Status: DISCONTINUED | OUTPATIENT
Start: 2019-11-06 | End: 2019-11-06 | Stop reason: SURG

## 2019-11-06 RX ORDER — ASPIRIN 325 MG
325 TABLET, DELAYED RELEASE (ENTERIC COATED) ORAL DAILY
Status: DISCONTINUED | OUTPATIENT
Start: 2019-11-07 | End: 2019-11-10 | Stop reason: HOSPADM

## 2019-11-06 RX ORDER — SODIUM CHLORIDE, SODIUM LACTATE, POTASSIUM CHLORIDE, CALCIUM CHLORIDE 600; 310; 30; 20 MG/100ML; MG/100ML; MG/100ML; MG/100ML
50 INJECTION, SOLUTION INTRAVENOUS CONTINUOUS PRN
Status: DISCONTINUED | OUTPATIENT
Start: 2019-11-06 | End: 2019-11-07

## 2019-11-06 RX ORDER — MORPHINE SULFATE 2 MG/ML
2 INJECTION, SOLUTION INTRAMUSCULAR; INTRAVENOUS
Status: DISCONTINUED | OUTPATIENT
Start: 2019-11-06 | End: 2019-11-06 | Stop reason: HOSPADM

## 2019-11-06 RX ORDER — SODIUM CHLORIDE, SODIUM LACTATE, POTASSIUM CHLORIDE, CALCIUM CHLORIDE 600; 310; 30; 20 MG/100ML; MG/100ML; MG/100ML; MG/100ML
100 INJECTION, SOLUTION INTRAVENOUS CONTINUOUS
Status: DISCONTINUED | OUTPATIENT
Start: 2019-11-06 | End: 2019-11-06

## 2019-11-06 RX ORDER — ONDANSETRON 2 MG/ML
INJECTION INTRAMUSCULAR; INTRAVENOUS AS NEEDED
Status: DISCONTINUED | OUTPATIENT
Start: 2019-11-06 | End: 2019-11-06 | Stop reason: SURG

## 2019-11-06 RX ORDER — HYDRALAZINE HYDROCHLORIDE 20 MG/ML
5 INJECTION INTRAMUSCULAR; INTRAVENOUS
Status: DISCONTINUED | OUTPATIENT
Start: 2019-11-06 | End: 2019-11-06 | Stop reason: HOSPADM

## 2019-11-06 RX ORDER — DEXAMETHASONE SODIUM PHOSPHATE 4 MG/ML
INJECTION, SOLUTION INTRA-ARTICULAR; INTRALESIONAL; INTRAMUSCULAR; INTRAVENOUS; SOFT TISSUE AS NEEDED
Status: DISCONTINUED | OUTPATIENT
Start: 2019-11-06 | End: 2019-11-06 | Stop reason: SURG

## 2019-11-06 RX ORDER — ONDANSETRON 4 MG/1
4 TABLET, FILM COATED ORAL EVERY 6 HOURS PRN
Status: DISCONTINUED | OUTPATIENT
Start: 2019-11-06 | End: 2019-11-10 | Stop reason: HOSPADM

## 2019-11-06 RX ADMIN — ACETAMINOPHEN 1000 MG: 500 TABLET, FILM COATED ORAL at 10:15

## 2019-11-06 RX ADMIN — METOCLOPRAMIDE 5 MG: 5 INJECTION, SOLUTION INTRAMUSCULAR; INTRAVENOUS at 14:13

## 2019-11-06 RX ADMIN — ONDANSETRON HYDROCHLORIDE 4 MG: 2 SOLUTION INTRAMUSCULAR; INTRAVENOUS at 14:00

## 2019-11-06 RX ADMIN — SODIUM CHLORIDE, POTASSIUM CHLORIDE, SODIUM LACTATE AND CALCIUM CHLORIDE 100 ML/HR: 600; 310; 30; 20 INJECTION, SOLUTION INTRAVENOUS at 09:23

## 2019-11-06 RX ADMIN — LIDOCAINE HYDROCHLORIDE 100 MG: 20 INJECTION, SOLUTION INFILTRATION; PERINEURAL at 10:54

## 2019-11-06 RX ADMIN — ROPIVACAINE HYDROCHLORIDE 20 ML: 5 INJECTION, SOLUTION EPIDURAL; INFILTRATION; PERINEURAL at 10:25

## 2019-11-06 RX ADMIN — LIDOCAINE HYDROCHLORIDE 1 EACH: 40 SOLUTION TOPICAL at 10:55

## 2019-11-06 RX ADMIN — FENTANYL CITRATE 100 MCG: 50 INJECTION, SOLUTION INTRAMUSCULAR; INTRAVENOUS at 10:17

## 2019-11-06 RX ADMIN — FENTANYL CITRATE 100 MCG: 50 INJECTION INTRAMUSCULAR; INTRAVENOUS at 11:39

## 2019-11-06 RX ADMIN — FENTANYL CITRATE 50 MCG: 50 INJECTION INTRAMUSCULAR; INTRAVENOUS at 11:33

## 2019-11-06 RX ADMIN — ONDANSETRON HYDROCHLORIDE 4 MG: 2 SOLUTION INTRAMUSCULAR; INTRAVENOUS at 12:50

## 2019-11-06 RX ADMIN — SODIUM CHLORIDE, POTASSIUM CHLORIDE, SODIUM LACTATE AND CALCIUM CHLORIDE: 600; 310; 30; 20 INJECTION, SOLUTION INTRAVENOUS at 12:50

## 2019-11-06 RX ADMIN — FAMOTIDINE 20 MG: 20 TABLET, FILM COATED ORAL at 15:42

## 2019-11-06 RX ADMIN — ONDANSETRON HYDROCHLORIDE 4 MG: 2 SOLUTION INTRAMUSCULAR; INTRAVENOUS at 20:00

## 2019-11-06 RX ADMIN — HYDROMORPHONE HYDROCHLORIDE 0.5 MG: 2 INJECTION INTRAMUSCULAR; INTRAVENOUS; SUBCUTANEOUS at 11:48

## 2019-11-06 RX ADMIN — ROCURONIUM BROMIDE 30 MG: 10 INJECTION INTRAVENOUS at 10:54

## 2019-11-06 RX ADMIN — Medication 2 G: at 10:58

## 2019-11-06 RX ADMIN — LISINOPRIL 10 MG: 10 TABLET ORAL at 15:42

## 2019-11-06 RX ADMIN — HYDROMORPHONE HYDROCHLORIDE 0.5 MG: 2 INJECTION INTRAMUSCULAR; INTRAVENOUS; SUBCUTANEOUS at 13:07

## 2019-11-06 RX ADMIN — FENTANYL CITRATE 100 MCG: 50 INJECTION INTRAMUSCULAR; INTRAVENOUS at 10:54

## 2019-11-06 RX ADMIN — METOCLOPRAMIDE 5 MG: 5 INJECTION, SOLUTION INTRAMUSCULAR; INTRAVENOUS at 10:16

## 2019-11-06 RX ADMIN — PROPOFOL 150 MG: 10 INJECTION, EMULSION INTRAVENOUS at 10:54

## 2019-11-06 RX ADMIN — DEXAMETHASONE SODIUM PHOSPHATE 4 MG: 4 INJECTION, SOLUTION INTRAMUSCULAR; INTRAVENOUS at 11:05

## 2019-11-06 RX ADMIN — MORPHINE SULFATE: 10 INJECTION INTRAVENOUS at 15:49

## 2019-11-06 RX ADMIN — IBUPROFEN 600 MG: 600 TABLET, FILM COATED ORAL at 13:53

## 2019-11-06 RX ADMIN — MORPHINE SULFATE 2 MG: 2 INJECTION, SOLUTION INTRAMUSCULAR; INTRAVENOUS at 14:02

## 2019-11-06 RX ADMIN — SODIUM CHLORIDE 2 G: 9 INJECTION, SOLUTION INTRAVENOUS at 18:58

## 2019-11-06 NOTE — OP NOTE
[facility]  OP NOTE    Patient Name: Hailey Dickerson    MRN:  [id number]    SURGEON: Rashaad Capps MD     Date of Procedure: 11/6/2019     PREOPERATIVE DIAGNOSIS:  Primary Osteoarthritis of the Left Knee    POSTOPERATIVE DIAGNOSIS:  Primary Osteoarthritis of the Left Knee    PROCEDURE:  Left total knee Arthroplasty     COMPLICATIONS: None    INDICATIONS/FINDINGS:  Hailey Dickerson is a 79 y.o. female that has developed significant primary osteoarthritis of the left knee with chronic pain.  It was felt that a total knee replacement was indicated.  The patient understands the risk of bleeding, infections and anesthetic problems.  The patient asked me to proceed with the procedure.  At surgery, the EnGeneIC triTandemn knee system was used, utilizing a #4 left posterior stabilized femoral component with a 3 tibial baseplate with 3 x 9 mm polyethylene posterior stabilized meniscal bearing, with a 9 x 9 mm polyethylene patellar button with cement on all components.      DESCRIPTION OF PROCEDURE:  After an adequate level of general anesthesia, the patient's left lower extremity was prepped and draped in the usual fashion.  The extremity was then exsanguinated with an Esmarch bandage and the tourniquet was inflated to 300 mmHg.  A midline incision was then made, followed by a medial parapatellar arthrotomy incision.  The patella was everted and the knee was flexed up.  Adequate releases were carried out.  Significant osteophytes were removed with a rongeur.  Openings were then created in the distal femur and proximal tibia with a drill.  The distal femoral resection guide was placed and pinned into position and a 10 mm thick x 5 degree valgus distal femoral resection was carried out.  The distal femur was incised, and the multi angle distal femoral resection guide was placed and these cuts were then made.  The proximal tibial resection guide was placed and the cut was made for adequate extension and flexion gap  balance.  Bone in the posterior compartment was removed with an osteotome and Kocher.  The box cut was made in the distal femur with the guide and saw in the usual fashion.  A trial tibial baseplate was then pinned into position and a trial femoral component was placed after plugging the opening in the femur with cancellous bone.  A trial meniscal bearing was placed.  The knee was placed in full extension.  The undersurface of the patella was then resected, and the drill guide was utilized in the usual fashion.  A trial patellar component was placed and the patient was taken through a range of motion and the components were chosen and opened.  Everything was removed except for the tibial baseplate and the metaphyseal punch cut was made and then enlarged after removing the trial tibial baseplate.  The final cleanup of the posterior compartment was then carried out with cautery.  The wound was then thoroughly irrigated and sectioned dry.  The knee was flexed up.  Regular viscosity cement was then pressurized into the tibial metaphysis, and the tibial implant was then impacted into position and any excess cement was then removed.  The femoral component was then cemented and impacted into position.  A trial meniscal bearing was placed.  The knee was placed in full extension.  The patella was then cemented and held with a patellar clamp.  After the final cement cleanup and after the cement had hardened, the trial meniscal bearing was removed and the tourniquet was deflated and removed.  Any bleeders were then controlled with cautery.  The knee was then again thoroughly irrigated with saline.  The definitive meniscal bearing was then placed and the knee was taken through a range of motion, and the balance appeared to be excellent.  Closure was then accomplished over a deep and superficial Hemovac drain using #1 Vicryl on the deep layers in an interrupted fashion, followed on the skin.  A dressing was applied.  The patient  tolerated the procedure well and was transferred to [facility] in stable condition.    Rashaad Capps MD    11/6/2019  1:25 PM

## 2019-11-06 NOTE — ANESTHESIA PREPROCEDURE EVALUATION
Anesthesia Evaluation     Patient summary reviewed   no history of anesthetic complications:  NPO Solid Status: > 8 hours  NPO Liquid Status: > 8 hours           Airway   Mallampati: III  TM distance: >3 FB  Neck ROM: full  Dental          Pulmonary - normal exam    breath sounds clear to auscultation  (-) asthma, recent URI, sleep apnea, not a smoker  Cardiovascular - normal exam  Exercise tolerance: good (4-7 METS)    ECG reviewed  Rhythm: regular  Rate: normal    (+) hypertension,   (-) pacemaker, past MI, angina, cardiac stents, CABG      Neuro/Psych  (-) seizures, TIA, CVA  GI/Hepatic/Renal/Endo    (+) obesity,  GERD,    (-) liver disease, no renal disease, diabetes, no thyroid disorder    Musculoskeletal     Abdominal    Substance History      OB/GYN          Other                        Anesthesia Plan    ASA 2     general with block     intravenous induction     Anesthetic plan, all risks, benefits, and alternatives have been provided, discussed and informed consent has been obtained with: patient.

## 2019-11-06 NOTE — ANESTHESIA PROCEDURE NOTES
Airway  Urgency: elective    Date/Time: 11/6/2019 10:56 AM  Airway not difficult    General Information and Staff    Patient location during procedure: OR  CRNA: Brendon Banerjee CRNA    Indications and Patient Condition  Indications for airway management: airway protection    Preoxygenated: yes  MILS maintained throughout  Mask difficulty assessment: 1 - vent by mask    Final Airway Details  Final airway type: endotracheal airway      Successful airway: ETT  Cuffed: yes   Successful intubation technique: direct laryngoscopy  Facilitating devices/methods: intubating stylet  Endotracheal tube insertion site: oral  Blade: Fazal  Blade size: 4  ETT size (mm): 7.5  Cormack-Lehane Classification: grade I - full view of glottis  Placement verified by: chest auscultation and capnometry   Cuff volume (mL): 8  Measured from: lips  ETT/EBT  to lips (cm): 21  Number of attempts at approach: 1  Assessment: lips, teeth, and gum same as pre-op and atraumatic intubation

## 2019-11-06 NOTE — ANESTHESIA PROCEDURE NOTES
Peripheral Block    Pre-sedation assessment completed: 11/6/2019 10:12 AM    Patient reassessed immediately prior to procedure    Patient location during procedure: holding area  Start time: 11/6/2019 10:19 AM  Stop time: 11/6/2019 10:25 AM  Reason for block: procedure for pain, at surgeon's request, post-op pain management and Requested by Dr. Capps  Performed by  CRNA: Hunter Chinchilla CRNA  Preanesthetic Checklist  Completed: patient identified, site marked, surgical consent, pre-op evaluation, timeout performed, IV checked, risks and benefits discussed and monitors and equipment checked  Prep:  Pt Position: supine  Prep: ChloraPrep  Patient monitoring: blood pressure monitoring, continuous pulse oximetry and EKG  Procedure  Sedation:yes  Performed under: MAC  Guidance:ultrasound guided and femoral artery identified in adductor canal and local anesthetic seen surrounding artery  ULTRASOUND INTERPRETATION. Using ultrasound guidance a 20 G gauge needle was placed in close proximity to the nerve, at which point, under ultrasound guidance anesthetic was injected in the area of the nerve and spread of the anesthesia was seen on ultrasound in close proximity thereto.  There were no abnormalities seen on ultrasound; a digital image was taken; and the patient tolerated the procedure with no complications. Images:still images obtained (picture printed and placed in patients chart)    Laterality:left  Block Type:adductor canal block  Injection Technique:single-shot  Needle Type:echogenic  Needle Gauge:20 G  Resistance on Injection: none    Medications Used: ropivacaine (NAROPIN) injection 0.5 %, 20 mL  Med admintered at 11/6/2019 10:25 AM      Post Assessment  Injection Assessment: negative aspiration for heme, no paresthesia on injection and incremental injection  Patient Tolerance:comfortable throughout block  Complications:no

## 2019-11-06 NOTE — ANESTHESIA POSTPROCEDURE EVALUATION
Patient: Hailey KUMAR Stone    Procedure Summary     Date:  11/06/19 Room / Location:  [facility] OR  /  [facility] OR    Anesthesia Start:  1050 Anesthesia Stop:  1333    Procedure:  TOTAL KNEE REPLACEMENT (Left Knee) Diagnosis:  (M17.12)    Surgeon:  Rashaad Capps MD Provider:  Brendon Banerjee CRNA    Anesthesia Type:  general with block ASA Status:  2          Anesthesia Type: general with block  Last vitals  BP   132/75 (11/06/19 1430)   Temp   98.1 °F (36.7 °C) (11/06/19 1430)   Pulse   83 (11/06/19 1430)   Resp   14 (11/06/19 1430)     SpO2   99 % (11/06/19 1430)     Post Anesthesia Care and Evaluation    Patient location during evaluation: PACU  Level of consciousness: awake  Pain management: adequate  Airway patency: patent  Anesthetic complications: No anesthetic complications  PONV Status: none  Cardiovascular status: acceptable  Respiratory status: acceptable  Hydration status: acceptable

## 2019-11-07 LAB
HCT VFR BLD AUTO: 27.9 % (ref 34–46.6)
HGB BLD-MCNC: 9.5 G/DL (ref 12–15.9)

## 2019-11-07 PROCEDURE — 25010000002 ONDANSETRON PER 1 MG: Performed by: ORTHOPAEDIC SURGERY

## 2019-11-07 PROCEDURE — 97530 THERAPEUTIC ACTIVITIES: CPT | Performed by: PHYSICAL THERAPIST

## 2019-11-07 PROCEDURE — 25010000002 CEFAZOLIN PER 500 MG: Performed by: ORTHOPAEDIC SURGERY

## 2019-11-07 PROCEDURE — 97162 PT EVAL MOD COMPLEX 30 MIN: CPT | Performed by: PHYSICAL THERAPIST

## 2019-11-07 PROCEDURE — 85018 HEMOGLOBIN: CPT | Performed by: ORTHOPAEDIC SURGERY

## 2019-11-07 PROCEDURE — 97116 GAIT TRAINING THERAPY: CPT

## 2019-11-07 PROCEDURE — 85014 HEMATOCRIT: CPT | Performed by: ORTHOPAEDIC SURGERY

## 2019-11-07 PROCEDURE — 97166 OT EVAL MOD COMPLEX 45 MIN: CPT

## 2019-11-07 PROCEDURE — 97110 THERAPEUTIC EXERCISES: CPT

## 2019-11-07 RX ORDER — SIMVASTATIN 20 MG
20 TABLET ORAL NIGHTLY
COMMUNITY
End: 2020-03-30

## 2019-11-07 RX ORDER — RANITIDINE 150 MG/1
150 TABLET ORAL NIGHTLY
COMMUNITY
End: 2019-11-12 | Stop reason: RX

## 2019-11-07 RX ORDER — LISINOPRIL 10 MG/1
10 TABLET ORAL DAILY
COMMUNITY
End: 2020-05-11

## 2019-11-07 RX ORDER — CITALOPRAM 20 MG/1
20 TABLET ORAL DAILY
COMMUNITY
End: 2020-04-20

## 2019-11-07 RX ORDER — INDAPAMIDE 2.5 MG/1
2.5 TABLET, FILM COATED ORAL EVERY MORNING
COMMUNITY
End: 2020-04-27

## 2019-11-07 RX ADMIN — HYDROCODONE BITARTRATE AND ACETAMINOPHEN 1 TABLET: 7.5; 325 TABLET ORAL at 01:12

## 2019-11-07 RX ADMIN — HYDROCODONE BITARTRATE AND ACETAMINOPHEN 1 TABLET: 7.5; 325 TABLET ORAL at 14:58

## 2019-11-07 RX ADMIN — HYDROCODONE BITARTRATE AND ACETAMINOPHEN 1 TABLET: 7.5; 325 TABLET ORAL at 20:02

## 2019-11-07 RX ADMIN — HYDROCODONE BITARTRATE AND ACETAMINOPHEN 1 TABLET: 7.5; 325 TABLET ORAL at 09:56

## 2019-11-07 RX ADMIN — SODIUM CHLORIDE 2 G: 9 INJECTION, SOLUTION INTRAVENOUS at 02:08

## 2019-11-07 RX ADMIN — ONDANSETRON HYDROCHLORIDE 4 MG: 2 SOLUTION INTRAMUSCULAR; INTRAVENOUS at 08:59

## 2019-11-07 RX ADMIN — ASPIRIN 325 MG: 325 TABLET, COATED ORAL at 09:57

## 2019-11-07 RX ADMIN — FAMOTIDINE 20 MG: 20 TABLET, FILM COATED ORAL at 08:59

## 2019-11-07 NOTE — PLAN OF CARE
Problem: Patient Care Overview  Goal: Plan of Care Review  Outcome: Ongoing (interventions implemented as appropriate)   11/07/19 0626   Coping/Psychosocial   Plan of Care Reviewed With patient;daughter   Plan of Care Review   Progress improving   OTHER   Outcome Summary PT is A&O X4, c/o moderate pain throughout night, PCA utalized with good effect. pt hypotensive at 0400 vitals after PO pain med used. asymptomatic. Heels offloaded. safety maintained will continue to monitor.      Goal: Interprofessional Rounds/Family Conf  Outcome: Ongoing (interventions implemented as appropriate)      Problem: Fall Risk (Adult)  Goal: Identify Related Risk Factors and Signs and Symptoms  Outcome: Outcome(s) achieved Date Met: 11/07/19    Goal: Absence of Fall  Outcome: Ongoing (interventions implemented as appropriate)

## 2019-11-07 NOTE — THERAPY EVALUATION
Acute Care - Occupational Therapy Treatment Note  [facility]     Patient Name: Hailey Dickerson  : 1940  MRN: [id number]  Today's Date: 2019  Onset of Illness/Injury or Date of Surgery: 19  Date of Referral to OT: 19  Referring Physician: MD Jefry    Admit Date: 2019       ICD-10-CM ICD-9-CM   1. Impaired mobility and ADLs Z74. 799.89   2. Impaired mobility Z74. 799.89     Patient Active Problem List   Diagnosis   • Essential hypertension   • Anxiety   • GERD (gastroesophageal reflux disease)   • Primary osteoarthritis of left knee   • Osteoarthritis of left knee     Past Medical History:   Diagnosis Date   • Anxiety    • Arthritis    • GERD (gastroesophageal reflux disease)    • Hypertension      Past Surgical History:   Procedure Laterality Date   • ANKLE SURGERY     • CHOLECYSTECTOMY     • DILATATION AND CURETTAGE     • TOTAL KNEE ARTHROPLASTY Left 2019    Procedure: TOTAL KNEE REPLACEMENT;  Surgeon: Rashaad Capps MD;  Location: [facility];  Service: Orthopedics       Therapy Treatment    Rehabilitation Treatment Summary     Row Name 19 1442             Treatment Time/Intention    Discipline  physical therapy assistant  -      Document Type  therapy note (daily note)  -2      Subjective Information  complains of;pain;nausea/vomiting;dizziness  -3      Existing Precautions/Restrictions  fall  -3      Recorded by [] Catarina Teixeira, PTA 19 1449  [AH2] Catarina Teixeira, PTA 19 1507  [AH3] Catarina Teixeira, PTA 19 1539      Row Name 19 1442             Bed Mobility Assessment/Treatment    Bed Mobility Assessment/Treatment  supine-sit;sit-supine  -      Supine-Sit [place] (Bed Mobility)  verbal cues;supervision  -      Sit-Supine [place] (Bed Mobility)  contact guard;minimum assist (75% patient effort);verbal cues with LLE  -      Recorded by [] Catarina Teixeira, PTA 19 1539      Row Name 19 1442              Sit-Stand Transfer    Sit-Stand [place] (Transfers)  minimum assist (75% patient effort);verbal cues  -AH      Recorded by [] Catarina Teixeira, PTA 11/07/19 1539      Row Name 11/07/19 1442             Stand-Sit Transfer    Stand-Sit [place] (Transfers)  contact guard;verbal cues  -AH      Recorded by [] Catarina Teixeira, PTA 11/07/19 1539      Row Name 11/07/19 1442             Gait/Stairs Assessment/Training    [place] Level (Gait)  contact guard;verbal cues  -AH      Assistive Device (Gait)  walker, front-wheeled  -      Distance in Feet (Gait)  10  -AH      Left Sided Gait Deviations  heel strike decreased no active dorsiflexion  -      Comment (Gait/Stairs)  constant cues for rwx placement, assist for rwx placement  -      Recorded by [] Catarina Teixeira, PTA 11/07/19 1539      Row Name 11/07/19 1442             General ROM    GENERAL ROM COMMENTS  AROM L knee 14-80  -      Recorded by [] Catarina Teixeira, PTA 11/07/19 1539      Row Name 11/07/19 1442             Motor Skills Assessment/Interventions    Additional Documentation  Therapeutic Exercise (Group)  -      Recorded by [] Catarina Teixeira, PTA 11/07/19 1539      Row Name 11/07/19 1442             Therapeutic Exercise    Therapeutic Exercise  supine, lower extremities  -      Additional Documentation  Therapeutic Exercise (Row)  -      Recorded by [] Catarina Teixeira, PTA 11/07/19 1539      Row Name 11/07/19 1442             Lower Extremity Supine Therapeutic Exercise    Performed, Supine Lower Extremity (Therapeutic Exercise)  ankle pumps;SAQ (short arc quad) over bolster;SLR (straight leg raise);quadriceps sets;heel slides  -      Exercise Type, Supine Lower Extremity (Therapeutic Exercise)  AROM (active range of motion)  -      Sets/Reps Detail, Supine Lower Extremity (Therapeutic Exercise)  10  -AH      Comment, Supine Lower Extremity (Therapeutic Exercise)  BLE, no active dorsiflex on LEFT  -      Recorded by  [] Catarina Teixeira, PTA 11/07/19 1539      Row Name 11/07/19 1442             Positioning and Restraints    Pre-Treatment Position  in bed  -      Post Treatment Position  bed  -      In Bed  fowlers;call light within reach;encouraged to call for assist;with family/caregiver  -      Recorded by [] Catarina Teixeira, PTA 11/07/19 1539      Row Name 11/07/19 1442             Pain Scale: Numbers Pre/Post-Treatment    Pain Scale: Numbers, Pretreatment  6/10  -      Pain Scale: Numbers, Post-Treatment  7/10  -      Pain Location - Side  Left  -AH      Pain Location  knee  -AH      Pain Intervention(s)  Medication (See MAR);Repositioned;Ambulation/increased activity  -      Recorded by [] Catarina Teixeira, PTA 11/07/19 1539      Row Name                Wound 11/06/19 1139 Left knee Incision    Wound - Properties Group Date first assessed: 11/06/19 [] Time first assessed: 1139 [] Side: Left [] Location: knee [] Primary Wound Type: Incision [] Recorded by:  [] Marii Álvarez RN 11/06/19 1139      User Key  (r) = Recorded By, (t) = Taken By, (c) = Cosigned By    Initials Name Effective Dates Discipline     Catarina Teixeira, PTA 08/02/16 -  PT     Marii Álvarez RN 06/07/17 -  Nurse        Wound 11/06/19 1139 Left knee Incision (Active)   Dressing Appearance dry;intact 11/7/2019 10:26 AM   Closure Sutures 11/7/2019 10:26 AM   Base dressing in place, unable to visualize 11/7/2019  8:59 AM   Drainage Amount small 11/7/2019 10:26 AM   Dressing Care, Wound dressing changed;gauze;low-adherent 11/7/2019 10:26 AM     Rehab Goal Summary     Row Name 11/07/19 0800 11/07/19 0750          Bed Mobility Goal 1 (PT)    Activity/Assistive Device (Bed Mobility Goal 1, PT)  --  sit to supine;supine to sit  -SB     [place] Level/Cues Needed (Bed Mobility Goal 1, PT)  --  conditional independence  -SB     Time Frame (Bed Mobility Goal 1, PT)  --  10 days  -SB     Progress/Outcomes (Bed Mobility Goal 1, PT)   --  goal ongoing  -SB        Transfer Goal 1 (PT)    Activity/Assistive Device (Transfer Goal 1, PT)  --  sit-to-stand/stand-to-sit;bed-to-chair/chair-to-bed;walker, rolling  -SB     [place] Level/Cues Needed (Transfer Goal 1, PT)  --  supervision required  -SB     Time Frame (Transfer Goal 1, PT)  --  10 days  -SB     Progress/Outcome (Transfer Goal 1, PT)  --  goal ongoing  -SB        Gait Training Goal 1 (PT)    Activity/Assistive Device (Gait Training Goal 1, PT)  --  gait (walking locomotion);improve balance and speed;increase endurance/gait distance;assistive device use;decrease fall risk;increase energy conservation;walker, rolling  -SB     [place] Level (Gait Training Goal 1, PT)  --  supervision required  -SB     Distance (Gait Goal 1, PT)  --  150  -SB     Time Frame (Gait Training Goal 1, PT)  --  10 days  -SB     Progress/Outcome (Gait Training Goal 1, PT)  --  goal ongoing  -SB        ROM Goal 1 (PT)    ROM Goal 1 (PT)  --  L knee AROM 15-90 degs  -SB     Time Frame (ROM Goal 1, PT)  --  long term goal (LTG)  -SB     Progress/Outcome (ROM Goal 1, PT)  --  goal ongoing  -SB        Patient Education Goal (PT)    Activity (Patient Education Goal, PT)  --  Pt will be independent with knee HEP  -SB     [place]/Cues/Accuracy (Memory Goal 2, PT)  --  independent  -SB     Time Frame (Patient Education Goal, PT)  --  10 days  -SB     Progress/Outcome (Patient Education Goal, PT)  --  goal ongoing  -SB        Occupational Therapy Goals    Transfer Goal Selection (OT)  transfer, OT goal 1  -MW (r) AB (t) MW (c)  --     Bathing Goal Selection (OT)  bathing, OT goal 1  -MW (r) AB (t) MW (c)  --     Dressing Goal Selection (OT)  dressing, OT goal 1  -MW (r) AB (t) MW (c)  --        Transfer Goal 1 (OT)    Activity/Assistive Device (Transfer Goal 1, OT)  transfers, all;sit-to-stand/stand-to-sit;bed-to-chair/chair-to-bed;toilet;shower chair;walk-in shower  -MW (r) AB (t) MW (c)  --     [place]  Level/Cues Needed (Transfer Goal 1, OT)  conditional independence  -MW (r) AB (t) MW (c)  --     Time Frame (Transfer Goal 1, OT)  long term goal (LTG);by discharge  -MW (r) AB (t) MW (c)  --     Progress/Outcome (Transfer Goal 1, OT)  goal ongoing  -MW (r) AB (t) MW (c)  --        Bathing Goal 1 (OT)    Activity/Assistive Device (Bathing Goal 1, OT)  bathing skills, all;upper body bathing;lower body bathing;long-handled sponge;reacher;shower chair  -MW (r) AB (t) MW (c)  --     [place] Level/Cues Needed (Bathing Goal 1, OT)  minimum assist (75% or more patient effort);verbal cues required  -MW (r) AB (t) MW (c)  --     Time Frame (Bathing Goal 1, OT)  long term goal (LTG)  -MW (r) AB (t) MW (c)  --     Progress/Outcomes (Bathing Goal 1, OT)  goal ongoing  -MW (r) AB (t) MW (c)  --        Dressing Goal 1 (OT)    Activity/Assistive Device (Dressing Goal 1, OT)  lower body dressing;reacher;sock-aid  -MW (r) AB (t) MW (c)  --     [place]/Cues Needed (Dressing Goal 1, OT)  minimum assist (75% or more patient effort);verbal cues required  -MW (r) AB (t) MW (c)  --     Time Frame (Dressing Goal 1, OT)  long term goal (LTG);by discharge  -MW (r) AB (t) MW (c)  --     Progress/Outcome (Dressing Goal 1, OT)  goal ongoing  -MW (r) AB (t) MW (c)  --       User Key  (r) = Recorded By, (t) = Taken By, (c) = Cosigned By    Initials Name Provider Type Discipline    Flora Mehta, OTR/L Occupational Therapist OT    Evelyn Santos, PT DPT Physical Therapist PT    Rosa Shipley, OT Student OT Student OT        Occupational Therapy Education     Title: PT OT SLP Therapies (Done)     Topic: Occupational Therapy (Done)     Point: ADL training (Done)     Description: Instruct learner(s) on proper safety adaptation and remediation techniques during self care or transfers.   Instruct in proper use of assistive devices.    Learning Progress Summary           Patient Fredis, SUSANNE VU by AB at 11/7/2019  9:27 AM     Comment:  OT POC, safety awareness, risks/benefits, ADL, txfrs, D/C plans                   Point: Precautions (Done)     Description: Instruct learner(s) on prescribed precautions during self-care and functional transfers.    Learning Progress Summary           Patient Acceptance, E, VU by AB at 11/7/2019  9:27 AM    Comment:  OT POC, safety awareness, risks/benefits, ADL, txfrs, D/C plans                   Point: Body mechanics (Done)     Description: Instruct learner(s) on proper positioning and spine alignment during self-care, functional mobility activities and/or exercises.    Learning Progress Summary           Patient Acceptance, E, VU by AB at 11/7/2019  9:27 AM    Comment:  OT POC, safety awareness, risks/benefits, ADL, txfrs, D/C plans                               User Key     Initials Effective Dates Name Provider Type Discipline    AB 07/25/19 -  Rosa Palmer, OT Student OT Student OT                OT Recommendation and Plan  Outcome Summary/Treatment Plan (OT)  Anticipated Equipment Needs at Discharge (OT): reacher, dressing equipment  Anticipated Discharge Disposition (OT): home with assist, home with home health, home with OP services  Planned Therapy Interventions (OT Eval): activity tolerance training, adaptive equipment training, BADL retraining, functional balance retraining, occupation/activity based interventions, ROM/therapeutic exercise, transfer/mobility retraining, patient/caregiver education/training  Therapy Frequency (OT Eval): 5 times/wk  Plan of Care Review  Plan of Care Reviewed With: patient  Plan of Care Reviewed With: patient  Outcome Summary: OT eval complete. Pt fowlers, A&Ox4 upon entry, and agreeable to therapy upon entry. Pt completed bed mobility to come to EOB with SBA for sup>sit and Adryan for scooting to edge. Pt completed LBD at EOB wiht MaxA for donning socks. Pt completed sit<>stand with Adryan and VCs. Pt was able to take about 5 steps to chair with CGA and VCs for  RW placement. Pt required x2 attempts d/t nausea. Pt demo's WFL strength and AROM. Pt requires increased assistance with ADLs, txfrs, and fxnl mobility from her baseline. Skilled OT warranted to address these deficits to increase ADL (I) and safety awareness to facillitate return to PLOF. Recommend D/C to home w/assist and HH/OP services.  Outcome Measures     Row Name 11/07/19 0900             How much help from another is currently needed...    Putting on and taking off regular lower body clothing?  2  -MW (r) AB (t) MW (c)      Bathing (including washing, rinsing, and drying)  2  -MW (r) AB (t) MW (c)      Toileting (which includes using toilet bed pan or urinal)  3  -MW (r) AB (t) MW (c)      Putting on and taking off regular upper body clothing  4  -MW (r) AB (t) MW (c)      Taking care of personal grooming (such as brushing teeth)  4  -MW (r) AB (t) MW (c)      Eating meals  4  -MW (r) AB (t) MW (c)      AM-PAC 6 Clicks Score (OT)  19  -MW (r) AB (t)         Functional Assessment    Outcome Measure Options  AM-PAC 6 Clicks Daily Activity (OT)  -MW (r) AB (t) MW (c)        User Key  (r) = Recorded By, (t) = Taken By, (c) = Cosigned By    Initials Name Provider Type    MW Flora Dean, OTR/L Occupational Therapist    AB Rosa Palmer, OT Student OT Student           Time Calculation:   Time Calculation- OT     Row Name 11/07/19 1539 11/07/19 0926          Time Calculation- OT    OT Start Time  --  0802 +10 chart review  -MW (r) AB (t) MW (c)     OT Stop Time  --  0855  -MW (r) AB (t) MW (c)     OT Time Calculation (min)  --  53 min  -MW (r) AB (t)     OT Received On  --  11/07/19  -MW (r) AB (t) MW (c)     OT Goal Re-Cert Due Date  --  11/17/19  -MW (r) AB (t) MW (c)        Timed Charges    47129 - Gait Training Minutes   23  -AH  --       User Key  (r) = Recorded By, (t) = Taken By, (c) = Cosigned By    Initials Name Provider Type     Catarina Teixeira, CLYDE Physical Therapy Assistant    Flora Mehta  K, OTR/L Occupational Therapist    AB Rosa Palmer, OT Student OT Student        Therapy Charges for Today     Code Description Service Date Service Provider Modifiers Qty    79086820008 HC OT EVAL MOD COMPLEXITY 4 11/7/2019 Rosa Palmer, OT Student GO 1               JAVED Chakraborty  11/7/2019

## 2019-11-07 NOTE — PLAN OF CARE
Problem: Patient Care Overview  Goal: Plan of Care Review  Outcome: Ongoing (interventions implemented as appropriate)   11/06/19 2429   Coping/Psychosocial   Plan of Care Reviewed With patient   Plan of Care Review   Progress improving   OTHER   Outcome Summary A&Ox4. C/o pain. Morphine PCA in use. Pt verbalizes correct use. Dressing CDI. HV. Wesley cath in place. 2L O2 nasal cannula. VSS. Safety maintained. Will continue to monitor.      Goal: Individualization and Mutuality  Outcome: Ongoing (interventions implemented as appropriate)    Goal: Discharge Needs Assessment  Outcome: Ongoing (interventions implemented as appropriate)    Goal: Interprofessional Rounds/Family Conf  Outcome: Ongoing (interventions implemented as appropriate)      Problem: Fall Risk (Adult)  Goal: Identify Related Risk Factors and Signs and Symptoms  Outcome: Ongoing (interventions implemented as appropriate)    Goal: Absence of Fall  Outcome: Ongoing (interventions implemented as appropriate)

## 2019-11-07 NOTE — PLAN OF CARE
Problem: Patient Care Overview  Goal: Plan of Care Review  Outcome: Ongoing (interventions implemented as appropriate)   11/07/19 1206   Coping/Psychosocial   Plan of Care Reviewed With patient   Plan of Care Review   Progress no change   OTHER   Outcome Summary PT eval completed. Pt alert and oriented x4. Pt performed bed mob with CGA and needed min A to scoot EOB. Pt performed sit to stand t/f with min A and VC to push from bed. Pt with c/o nausea and vomitting, and reqd to sit back onto bed. Nsg notified. Pt performed t/f and ambulated 8 feet with CGA and RW, and reqd max VC and tactile cues for use and placement of AD, and safety precautions. Pt displayed step to pattern, dec step length and dec heel strike with LLE. Pt left up in chair after session. Pt demonstrated dec strength and ROM in LLE, displaying 17-50 degs in L knee flexion/ext and also displayed significant dec in DF strength in LLE approx 1+/5. Pt reports that she did not have this weakness prior. Nsg notified. Pt will benefit from skilled PT to improve functional mobility, gait and balance. Recommend d/c home with HH and assist pending progress.

## 2019-11-07 NOTE — PROGRESS NOTES
Discharge Planning Assessment  [facility]     Patient Name: Hailey Dickerson  MRN: [id number]  Today's Date: 11/7/2019    Admit Date: 11/6/2019    Discharge Needs Assessment     Row Name 11/07/19 1056       Living Environment    Lives With  spouse    Name(s) of Who Lives With Patient  BETH DICKERSON- SPOUSE    Current Living Arrangements  home/apartment/condo    Primary Care Provided by  self    Provides Primary Care For  no one, unable/limited ability to care for self    Family Caregiver if Needed  spouse    Family Caregiver Names  BETH DICKERSON, SPOUSE    Quality of Family Relationships  helpful;involved;supportive    Able to Return to Prior Arrangements  yes       Resource/Environmental Concerns    Resource/Environmental Concerns  none    Transportation Concerns  car, none       Transition Planning    Patient/Family Anticipates Transition to  home with help/services    Patient/Family Anticipated Services at Transition  home health care    Transportation Anticipated  family or friend will provide       Discharge Needs Assessment    Readmission Within the Last 30 Days  no previous admission in last 30 days    Concerns to be Addressed  no discharge needs identified    Equipment Currently Used at Home  walker, rolling;commode    Anticipated Changes Related to Illness  none    Equipment Needed After Discharge  none    Outpatient/Agency/Support Group Needs  homecare agency    Offered/Gave Vendor List  yes    Patient's Choice of Community Agency(s)  [facility] PREFERRED.     Discharge Coordination/Progress  PLAN FOR HOME WITH [facility] . DME IN HOME. SPOUSE TO ASSIST. HAS RX AND PCP [facility]. WILL FOLLOW FOR REFERRAL AT TIME OF DC.         Discharge Plan    No documentation.       Destination      No service coordination in this encounter.      Durable Medical Equipment      No service coordination in this encounter.      Dialysis/Infusion      No service coordination in this encounter.      Home Medical  Care      No service coordination in this encounter.      Therapy      No service coordination in this encounter.      Community Resources      No service coordination in this encounter.          Demographic Summary    No documentation.       Functional Status    No documentation.       Psychosocial    No documentation.       Abuse/Neglect    No documentation.       Legal    No documentation.       Substance Abuse    No documentation.       Patient Forms    No documentation.           Sindi Rojas RN

## 2019-11-07 NOTE — THERAPY TREATMENT NOTE
Acute Care - Physical Therapy Treatment Note  [facility]     Patient Name: Hailey Dickerson  : 1940  MRN: [id number]  Today's Date: 2019  Onset of Illness/Injury or Date of Surgery: (P) 19     Referring Physician: MD Capps (P)    Admit Date: 2019    Visit Dx:    ICD-10-CM ICD-9-CM   1. Impaired mobility and ADLs Z74. 799.89   2. Impaired mobility Z74.09 799.89     Patient Active Problem List   Diagnosis   • Essential hypertension   • Anxiety   • GERD (gastroesophageal reflux disease)   • Primary osteoarthritis of left knee   • Osteoarthritis of left knee       Therapy Treatment    Rehabilitation Treatment Summary     Row Name 19 1442             Treatment Time/Intention    Discipline  physical therapy assistant  -      Document Type  therapy note (daily note)  -2      Subjective Information  complains of;pain;nausea/vomiting;dizziness  -3      Existing Precautions/Restrictions  fall  -3      Recorded by [] Catarina Teixeira, PTA 19 1449  [AH2] Catarina Teixeira, [facility] 19 1507  [AH3] Catarina Teixeira, [facility] 19 1539      Row Name 19 1442             Bed Mobility Assessment/Treatment    Bed Mobility Assessment/Treatment  supine-sit;sit-supine  -      Supine-Sit [place] (Bed Mobility)  verbal cues;supervision  -      Sit-Supine [place] (Bed Mobility)  contact guard;minimum assist (75% patient effort);verbal cues with LLE  -AH      Recorded by [] Catarina Teixeira, PTA 19 1539      Row Name 19 1442             Sit-Stand Transfer    Sit-Stand [place] (Transfers)  minimum assist (75% patient effort);verbal cues  -AH      Recorded by [] Catarina Teixeira, PTA 19 1539      Row Name 19 1442             Stand-Sit Transfer    Stand-Sit [place] (Transfers)  contact guard;verbal cues  -AH      Recorded by [] Catarina Teixeira, [facility] 19 1539      Row Name 19 1442             Gait/Stairs Assessment/Training     [place] Level (Gait)  contact guard;verbal cues  -      Assistive Device (Gait)  walker, front-wheeled  -      Distance in Feet (Gait)  10  -AH      Left Sided Gait Deviations  heel strike decreased no active dorsiflexion  -      Comment (Gait/Stairs)  constant cues for rwx placement, assist for rwx placement  -      Recorded by [] Catarina Teixeira, PTA 11/07/19 1539      Row Name 11/07/19 1442             General ROM    GENERAL ROM COMMENTS  AROM L knee 14-80  -      Recorded by [] Catarina Teixeira, PTA 11/07/19 1539      Row Name 11/07/19 1442             Motor Skills Assessment/Interventions    Additional Documentation  Therapeutic Exercise (Group)  -      Recorded by [] Catarina Teixeira, PTA 11/07/19 1539      Row Name 11/07/19 1442             Therapeutic Exercise    Therapeutic Exercise  supine, lower extremities  -      Additional Documentation  Therapeutic Exercise (Row)  -      Recorded by [] Catarina Teixeira, PTA 11/07/19 1539      Row Name 11/07/19 1442             Lower Extremity Supine Therapeutic Exercise    Performed, Supine Lower Extremity (Therapeutic Exercise)  ankle pumps;SAQ (short arc quad) over bolster;SLR (straight leg raise);quadriceps sets;heel slides  -      Exercise Type, Supine Lower Extremity (Therapeutic Exercise)  AROM (active range of motion)  -      Sets/Reps Detail, Supine Lower Extremity (Therapeutic Exercise)  10  -AH      Comment, Supine Lower Extremity (Therapeutic Exercise)  BLE, no active dorsiflex on LEFT  -      Recorded by [] Catarina Teixeira, PTA 11/07/19 1539      Row Name 11/07/19 1442             Positioning and Restraints    Pre-Treatment Position  in bed  -      Post Treatment Position  bed  -      In Bed  fowlers;call light within reach;encouraged to call for assist;with family/caregiver  -      Recorded by [] Catarina Teixeira, PTA 11/07/19 1539      Row Name 11/07/19 1442             Pain Scale: Numbers Pre/Post-Treatment     Pain Scale: Numbers, Pretreatment  6/10  -      Pain Scale: Numbers, Post-Treatment  7/10  -      Pain Location - Side  Left  -      Pain Location  knee  -      Pain Intervention(s)  Medication (See MAR);Repositioned;Ambulation/increased activity  -      Recorded by [] Catarina Teixeira, PTA 11/07/19 1539      Row Name                Wound 11/06/19 1139 Left knee Incision    Wound - Properties Group Date first assessed: 11/06/19 [] Time first assessed: 1139 [] Side: Left [] Location: knee [] Primary Wound Type: Incision [] Recorded by:  [] Marii Álvarez RN 11/06/19 1139      User Key  (r) = Recorded By, (t) = Taken By, (c) = Cosigned By    Initials Name Effective Dates Discipline     Catarina Teixeira, PTA 08/02/16 -  PT     Marii Álvarez, RN 06/07/17 -  Nurse          Wound 11/06/19 1139 Left knee Incision (Active)   Dressing Appearance dry;intact 11/7/2019 10:26 AM   Closure Sutures 11/7/2019 10:26 AM   Base dressing in place, unable to visualize 11/7/2019  8:59 AM   Drainage Amount small 11/7/2019 10:26 AM   Dressing Care, Wound dressing changed;gauze;low-adherent 11/7/2019 10:26 AM       Rehab Goal Summary     Row Name 11/07/19 0800 11/07/19 0750          Bed Mobility Goal 1 (PT)    Activity/Assistive Device (Bed Mobility Goal 1, PT)  --  sit to supine;supine to sit  -SB     [place] Level/Cues Needed (Bed Mobility Goal 1, PT)  --  conditional independence  -SB     Time Frame (Bed Mobility Goal 1, PT)  --  10 days  -SB     Progress/Outcomes (Bed Mobility Goal 1, PT)  --  goal ongoing  -SB        Transfer Goal 1 (PT)    Activity/Assistive Device (Transfer Goal 1, PT)  --  sit-to-stand/stand-to-sit;bed-to-chair/chair-to-bed;walker, rolling  -SB     [place] Level/Cues Needed (Transfer Goal 1, PT)  --  supervision required  -SB     Time Frame (Transfer Goal 1, PT)  --  10 days  -SB     Progress/Outcome (Transfer Goal 1, PT)  --  goal ongoing  -SB        Gait Training Goal 1 (PT)     Activity/Assistive Device (Gait Training Goal 1, PT)  --  gait (walking locomotion);improve balance and speed;increase endurance/gait distance;assistive device use;decrease fall risk;increase energy conservation;walker, rolling  -SB     [place] Level (Gait Training Goal 1, PT)  --  supervision required  -SB     Distance (Gait Goal 1, PT)  --  150  -SB     Time Frame (Gait Training Goal 1, PT)  --  10 days  -SB     Progress/Outcome (Gait Training Goal 1, PT)  --  goal ongoing  -SB        ROM Goal 1 (PT)    ROM Goal 1 (PT)  --  L knee AROM 15-90 degs  -SB     Time Frame (ROM Goal 1, PT)  --  long term goal (LTG)  -SB     Progress/Outcome (ROM Goal 1, PT)  --  goal ongoing  -SB        Patient Education Goal (PT)    Activity (Patient Education Goal, PT)  --  Pt will be independent with knee HEP  -SB     [place]/Cues/Accuracy (Memory Goal 2, PT)  --  independent  -SB     Time Frame (Patient Education Goal, PT)  --  10 days  -SB     Progress/Outcome (Patient Education Goal, PT)  --  goal ongoing  -SB        Occupational Therapy Goals    Transfer Goal Selection (OT)  transfer, OT goal 1  (Pended)   -AB  --     Bathing Goal Selection (OT)  bathing, OT goal 1  (Pended)   -AB  --     Dressing Goal Selection (OT)  dressing, OT goal 1  (Pended)   -AB  --        Transfer Goal 1 (OT)    Activity/Assistive Device (Transfer Goal 1, OT)  transfers, all;sit-to-stand/stand-to-sit;bed-to-chair/chair-to-bed;toilet;shower chair;walk-in shower  (Pended)   -AB  --     [place] Level/Cues Needed (Transfer Goal 1, OT)  conditional independence  (Pended)   -AB  --     Time Frame (Transfer Goal 1, OT)  long term goal (LTG);by discharge  (Pended)   -AB  --     Progress/Outcome (Transfer Goal 1, OT)  goal ongoing  (Pended)   -AB  --        Bathing Goal 1 (OT)    Activity/Assistive Device (Bathing Goal 1, OT)  bathing skills, all;upper body bathing;lower body bathing;long-handled sponge;reacher;shower chair  (Pended)   -AB  --      [place] Level/Cues Needed (Bathing Goal 1, OT)  minimum assist (75% or more patient effort);verbal cues required  (Pended)   -AB  --     Time Frame (Bathing Goal 1, OT)  long term goal (LTG)  (Pended)   -AB  --     Progress/Outcomes (Bathing Goal 1, OT)  goal ongoing  (Pended)   -AB  --        Dressing Goal 1 (OT)    Activity/Assistive Device (Dressing Goal 1, OT)  lower body dressing;reacher;sock-aid  (Pended)   -AB  --     [place]/Cues Needed (Dressing Goal 1, OT)  minimum assist (75% or more patient effort);verbal cues required  (Pended)   -AB  --     Time Frame (Dressing Goal 1, OT)  long term goal (LTG);by discharge  (Pended)   -AB  --     Progress/Outcome (Dressing Goal 1, OT)  goal ongoing  (Pended)   -AB  --       User Key  (r) = Recorded By, (t) = Taken By, (c) = Cosigned By    Initials Name Provider Type Discipline    Evelyn Santos, PT DPT Physical Therapist PT    Rosa Shipley, OT Student OT Student OT          Physical Therapy Education     Title: PT OT SLP Therapies (Done)     Topic: Physical Therapy (Done)     Point: Mobility training (Done)     Learning Progress Summary           Patient Acceptance, E, VU,NR by SB at 11/7/2019 12:06 PM    Comment:  pt edu on knee HEP, use of RW, safety precautions, POC, benefits of act, d/c plans                   Point: Home exercise program (Done)     Learning Progress Summary           Patient Acceptance, E, VU,NR by SB at 11/7/2019 12:06 PM    Comment:  pt edu on knee HEP, use of RW, safety precautions, POC, benefits of act, d/c plans                   Point: Body mechanics (Done)     Learning Progress Summary           Patient Acceptance, E, VU,NR by SB at 11/7/2019 12:06 PM    Comment:  pt edu on knee HEP, use of RW, safety precautions, POC, benefits of act, d/c plans                   Point: Precautions (Done)     Learning Progress Summary           Patient Acceptance, E, VU,NR by SB at 11/7/2019 12:06 PM    Comment:  pt edu on knee HEP,  use of RW, safety precautions, POC, benefits of act, d/c plans                               User Key     Initials Effective Dates Name Provider Type Discipline     10/31/19 -  Evelyn Moran, PT DPT Physical Therapist PT                PT Recommendation and Plan        Outcome Measures     Row Name 11/07/19 0900             How much help from another is currently needed...    Putting on and taking off regular lower body clothing?  2  (Pended)   -AB      Bathing (including washing, rinsing, and drying)  2  (Pended)   -AB      Toileting (which includes using toilet bed pan or urinal)  3  (Pended)   -AB      Putting on and taking off regular upper body clothing  4  (Pended)   -AB      Taking care of personal grooming (such as brushing teeth)  4  (Pended)   -AB      Eating meals  4  (Pended)   -AB      AM-PAC 6 Clicks Score (OT)  19  (Pended)   -AB         Functional Assessment    Outcome Measure Options  AM-PAC 6 Clicks Daily Activity (OT)  (Pended)   -AB        User Key  (r) = Recorded By, (t) = Taken By, (c) = Cosigned By    Initials Name Provider Type    AB Rosa Palmer, OT Student OT Student         Time Calculation:   PT Charges     Row Name 11/07/19 1539 11/07/19 1211          Time Calculation    Start Time  1442  -  0750  -SB     Stop Time  1530  -  0905  -SB     Time Calculation (min)  48 min  -  75 min  -SB     PT Received On  11/07/19  -  11/07/19  -SB     PT Goal Re-Cert Due Date  --  11/17/19  -SB        Time Calculation- PT    Total Timed Code Minutes- PT  48 minute(s)  -  --        Timed Charges    79174 - PT Therapeutic Exercise Minutes  25  -  --     95786 - Gait Training Minutes   23  -  --       User Key  (r) = Recorded By, (t) = Taken By, (c) = Cosigned By    Initials Name Provider Type     Catarina Teixeira, PTA Physical Therapy Assistant    Evelyn Santos, PT DPT Physical Therapist        Therapy Charges for Today     Code Description Service Date Service Provider Modifiers  Qty    [id number]  PT THER PROC EA 15 MIN 11/7/2019 Catarina Teixeira, PTA GP 2    [id number] HC GAIT TRAINING EA 15 MIN 11/7/2019 Catarina Teixeira, PTA GP 1          PT G-Codes  Outcome Measure Options: (P) AM-PAC 6 Clicks Daily Activity (OT)  AM-PAC 6 Clicks Score (PT): 18  AM-PAC 6 Clicks Score (OT): (P) 19    Catarina Teixeira, CLYDE  11/7/2019

## 2019-11-07 NOTE — THERAPY EVALUATION
Patient Name: Hailey Dickerson  : 1940    MRN: [id number]                              Today's Date: 2019       Admit Date: 2019    Visit Dx:     ICD-10-CM ICD-9-CM   1. Impaired mobility and ADLs Z74. 799.89   2. Impaired mobility Z74. 799.89     Patient Active Problem List   Diagnosis   • Essential hypertension   • Anxiety   • GERD (gastroesophageal reflux disease)   • Primary osteoarthritis of left knee   • Osteoarthritis of left knee     Past Medical History:   Diagnosis Date   • Anxiety    • Arthritis    • GERD (gastroesophageal reflux disease)    • Hypertension      Past Surgical History:   Procedure Laterality Date   • ANKLE SURGERY     • CHOLECYSTECTOMY     • DILATATION AND CURETTAGE     • TOTAL KNEE ARTHROPLASTY Left 2019    Procedure: TOTAL KNEE REPLACEMENT;  Surgeon: Rashaad Capps MD;  Location: [facility] OR;  Service: Orthopedics     General Information     Row Name 19 0750          PT Evaluation Time/Intention    Document Type  evaluation s/p L TKA   -SB     Mode of Treatment  physical therapy  -SB     Row Name 19 075          General Information    Patient Profile Reviewed?  yes  -SB     Prior Level of Function  independent:;all household mobility;ADL's;driving  -SB     Existing Precautions/Restrictions  fall  -SB     Barriers to Rehab  physical barrier  -SB     Row Name 19 075          Relationship/Environment    Lives With  spouse  -SB     Row Name 19 075          Resource/Environmental Concerns    Current Living Arrangements  home/apartment/condo walk in shower, front wheeled walker, SC, shower seat, BSC, grab bars  -SB     Row Name 19 075          Home Main Entrance    Number of Stairs, Main Entrance  none  -SB     Row Name 19 0750          Stairs Within Home, Primary    Number of Stairs, Within Home, Primary  none  -SB     Row Name 19 075          Cognitive Assessment/Intervention- PT/OT    Orientation Status  (Cognition)  oriented x 4;verbal cues/prompts needed for orientation  -SB     Personal Safety Interventions  gait belt;supervised activity;muscle strengthening facilitated;fall prevention program maintained;nonskid shoes/slippers when out of bed  -SB     Row Name 11/07/19 0750          Safety Issues, Functional Mobility    Safety Issues Affecting Function (Mobility)  problem solving;positioning of assistive device;ability to follow commands;at risk behavior observed;sequencing abilities  -SB     Impairments Affecting Function (Mobility)  balance;motor control;pain;strength;range of motion (ROM);endurance/activity tolerance;shortness of breath  -SB     Comment, Safety Issues/Impairments (Mobility)  pt needing inc processing time and max VC and TC for AD use  -SB       User Key  (r) = Recorded By, (t) = Taken By, (c) = Cosigned By    Initials Name Provider Type    Evelyn Santos, PT DPT Physical Therapist        Mobility     Row Name 11/07/19 0750          Bed Mobility Assessment/Treatment    Bed Mobility Assessment/Treatment  supine-sit  -SB     Scooting/Bridging [place] (Bed Mobility)  minimum assist (75% patient effort);verbal cues  -SB     Supine-Sit [place] (Bed Mobility)  contact guard;verbal cues  -SB     Assistive Device (Bed Mobility)  head of bed elevated;bed rails;draw sheet  -SB     Row Name 11/07/19 0750          Sit-Stand Transfer    Sit-Stand [place] (Transfers)  minimum assist (75% patient effort)  -SB     Assistive Device (Sit-Stand Transfers)  walker, front-wheeled  -SB     Row Name 11/07/19 0750          Gait/Stairs Assessment/Training    Gait/Stairs Assessment/Training  gait/ambulation independence  -SB     [place] Level (Gait)  contact guard;2 person assist  -SB     Assistive Device (Gait)  walker, front-wheeled  -SB     Distance in Feet (Gait)  8  -SB     Pattern (Gait)  step-to  -SB     Deviations/Abnormal Patterns (Gait)  left sided deviations;antalgic;[place]  decreased;festinating/shuffling;gait speed decreased;stride length decreased  -SB     Left Sided Gait Deviations  heel strike decreased;weight shift ability decreased  -SB     Comment (Gait/Stairs)  pt took a few steps away from EOB and demonstrated dec safety awareness and ability to position RW despite VC; pt with very minimal L foot clearance due to deficit in DF strength  -SB       User Key  (r) = Recorded By, (t) = Taken By, (c) = Cosigned By    Initials Name Provider Type    Evelyn Santos PT DPT Physical Therapist        Obj/Interventions     Row Name 11/07/19 [facility]0          General ROM    GENERAL ROM COMMENTS  RLE WFL; LLE hip imp 25%, knee 17-50 degs AROM, DF imp 75% but PROM WFL  -SB     Row Name 11/07/19 0750          MMT (Manual Muscle Testing)    General MMT Comments  RLE 4/5; LLE hip 2-/5, knee ext 2-/5, DF 1+/5   -SB     Row Name 11/07/19 0750          Static Sitting Balance    Level of [place] (Unsupported Sitting, Static Balance)  supervision  -SB     Sitting Position (Unsupported Sitting, Static Balance)  sitting on edge of bed  -SB     Row Name 11/07/19 0750          Dynamic Sitting Balance    Level of [place], Reaches Outside Midline (Sitting, Dynamic Balance)  contact guard assist  -SB     Sitting Position, Reaches Outside Midline (Sitting, Dynamic Balance)  sitting on edge of bed  -SB     Row Name 11/07/19 0750          Static Standing Balance    Level of [place] (Supported Standing, Static Balance)  contact guard assist  -SB     Assistive Device Utilized (Supported Standing, Static Balance)  walker, rolling  -SB     Row Name 11/07/19 0750          Sensory Assessment/Intervention    Sensory General Assessment  no sensation deficits identified  -SB       User Key  (r) = Recorded By, (t) = Taken By, (c) = Cosigned By    Initials Name Provider Type    Evelyn Santos PT DPT Physical Therapist        Goals/Plan     Row Name 11/07/19 0750          Bed Mobility Goal 1 (PT)     Activity/Assistive Device (Bed Mobility Goal 1, PT)  sit to supine;supine to sit  -SB     [place] Level/Cues Needed (Bed Mobility Goal 1, PT)  conditional independence  -SB     Time Frame (Bed Mobility Goal 1, PT)  10 days  -SB     Progress/Outcomes (Bed Mobility Goal 1, PT)  goal ongoing  -SB     Row Name 11/07/19 0750          Transfer Goal 1 (PT)    Activity/Assistive Device (Transfer Goal 1, PT)  sit-to-stand/stand-to-sit;bed-to-chair/chair-to-bed;walker, rolling  -SB     [place] Level/Cues Needed (Transfer Goal 1, PT)  supervision required  -SB     Time Frame (Transfer Goal 1, PT)  10 days  -SB     Progress/Outcome (Transfer Goal 1, PT)  goal ongoing  -SB     Row Name 11/07/19 0750          Gait Training Goal 1 (PT)    Activity/Assistive Device (Gait Training Goal 1, PT)  gait (walking locomotion);improve balance and speed;increase endurance/gait distance;assistive device use;decrease fall risk;increase energy conservation;walker, rolling  -SB     [place] Level (Gait Training Goal 1, PT)  supervision required  -SB     Distance (Gait Goal 1, PT)  150  -SB     Time Frame (Gait Training Goal 1, PT)  10 days  -SB     Progress/Outcome (Gait Training Goal 1, PT)  goal ongoing  -SB     Row Name 11/07/19 0750          ROM Goal 1 (PT)    ROM Goal 1 (PT)  L knee AROM 15-90 degs  -SB     Time Frame (ROM Goal 1, PT)  long term goal (LTG)  -SB     Progress/Outcome (ROM Goal 1, PT)  goal ongoing  -SB     Row Name 11/07/19 0750          Patient Education Goal (PT)    Activity (Patient Education Goal, PT)  Pt will be independent with knee HEP  -SB     [place]/Cues/Accuracy (Memory Goal 2, PT)  independent  -SB     Time Frame (Patient Education Goal, PT)  10 days  -SB     Progress/Outcome (Patient Education Goal, PT)  goal ongoing  -SB       User Key  (r) = Recorded By, (t) = Taken By, (c) = Cosigned By    Initials Name Provider Type    Evelyn Santos, PT DPT Physical Therapist        Clinical Impression      Row Name 11/07/19 0750          Pain Assessment    Additional Documentation  --  -SB     Row Name 11/07/19 0750          Pain Scale: Numbers Pre/Post-Treatment    Pain Scale: Numbers, Pretreatment  5/10  -SB     Pain Scale: Numbers, Post-Treatment  5/10  -SB     Pain Location - Side  Left  -SB     Pain Location  knee  -SB     Pre/Post Treatment Pain Comment  c/o nausea, tingling in LLE  -SB     Pain Intervention(s)  Medication (See MAR);Ambulation/increased activity;Repositioned  -SB     Row Name 11/07/19 0750          Plan of Care Review    Plan of Care Reviewed With  patient  -SB     Row Name 11/07/19 0750          Physical Therapy Clinical Impression    Patient/Family Goals Statement (PT Clinical Impression)  go home  -SB     Criteria for Skilled Interventions Met (PT Clinical Impression)  yes  -SB     Rehab Potential (PT Clinical Summary)  good, to achieve stated therapy goals  -SB     Predicted Duration of Therapy (PT)  until d/c  -SB     Row Name 11/07/19 0750          Vital Signs    Pre Systolic BP Rehab  97  -SB     Pre Treatment Diastolic BP  52  -SB     Intra Systolic BP Rehab  114  -SB     Intra Treatment Diastolic BP  58  -SB     Pretreatment Heart Rate (beats/min)  85  -SB     Intratreatment Heart Rate (beats/min)  95  -SB     Pre SpO2 (%)  94  -SB     O2 Delivery Pre Treatment  room air  -SB     Intra SpO2 (%)  98  -SB     O2 Delivery Intra Treatment  room air  -SB     Pre Patient Position  Supine  -SB     Intra Patient Position  Sitting  -SB     Row Name 11/07/19 0750          Positioning and Restraints    Pre-Treatment Position  in bed  -SB     Post Treatment Position  chair  -SB     In Chair  notified nsg;reclined;call light within reach;encouraged to call for assist;with nsg;legs elevated  -SB       User Key  (r) = Recorded By, (t) = Taken By, (c) = Cosigned By    Initials Name Provider Type    Evelyn Santos, PT DPT Physical Therapist        Outcome Measures     Row Name 11/07/19 0750           How much help from another person do you currently need...    Turning from your back to your side while in flat bed without using bedrails?  4  -SB     Moving from lying on back to sitting on the side of a flat bed without bedrails?  3  -SB     Moving to and from a bed to a chair (including a wheelchair)?  3  -SB     Standing up from a chair using your arms (e.g., wheelchair, bedside chair)?  3  -SB     Climbing 3-5 steps with a railing?  2  -SB     To walk in hospital room?  3  -SB     AM-PAC 6 Clicks Score (PT)  18  -SB     Row Name 11/07/19 0750          Functional Assessment    Outcome Measure Options  AM-PAC 6 Clicks Basic Mobility (PT)  -SB       User Key  (r) = Recorded By, (t) = Taken By, (c) = Cosigned By    Initials Name Provider Type    Evelyn Santos, PT DPT Physical Therapist        Physical Therapy Education     Title: PT OT SLP Therapies (Done)     Topic: Physical Therapy (Done)     Point: Mobility training (Done)     Learning Progress Summary           Patient Acceptance, E, VU,NR by SB at 11/7/2019 12:06 PM    Comment:  pt edu on knee HEP, use of RW, safety precautions, POC, benefits of act, d/c plans                   Point: Home exercise program (Done)     Learning Progress Summary           Patient Acceptance, E, VU,NR by SB at 11/7/2019 12:06 PM    Comment:  pt edu on knee HEP, use of RW, safety precautions, POC, benefits of act, d/c plans                   Point: Body mechanics (Done)     Learning Progress Summary           Patient Acceptance, E, VU,NR by SB at 11/7/2019 12:06 PM    Comment:  pt edu on knee HEP, use of RW, safety precautions, POC, benefits of act, d/c plans                   Point: Precautions (Done)     Learning Progress Summary           Patient Acceptance, E, VU,NR by SB at 11/7/2019 12:06 PM    Comment:  pt edu on knee HEP, use of RW, safety precautions, POC, benefits of act, d/c plans                               User Key     Initials Effective Dates Name Provider  Type Discipline    SB 10/31/19 -  Evelyn Moran PT DPT Physical Therapist PT              PT Recommendation and Plan  Planned Therapy Interventions (PT Eval): balance training, bed mobility training, gait training, home exercise program, patient/family education, ROM (range of motion), strengthening, transfer training  Outcome Summary/Treatment Plan (PT)  Anticipated Discharge Disposition (PT): home with home health, home with assist  Plan of Care Reviewed With: patient  Progress: no change  Outcome Summary: PT eval completed. Pt alert and oriented x4. Pt performed bed mob with CGA and needed min A to scoot EOB. Pt performed sit to stand t/f with min A and VC to push from bed. Pt with c/o nausea and vomitting, and reqd to sit back onto bed. Nsg notified. Pt performed t/f and ambulated 8 feet with CGA and RW, and reqd max VC and tactile cues for use and placement of AD, and safety precautions. Pt displayed step to pattern, dec step length and dec heel strike with LLE. Pt left up in chair after session. Pt demonstrated dec strength and ROM in LLE, displaying 17-50 degs in L knee flexion/ext and also displayed significant dec in DF strength in LLE approx 1+/5. Pt reports that she did not have this weakness prior. Nsg notified. Pt will benefit from skilled PT to improve functional mobility, gait and balance. Recommend d/c home with HH and assist pending progress.      Time Calculation:   PT Charges     Row Name 11/07/19 1211             Time Calculation    Start Time  0750  -SB      Stop Time  0905  -SB      Time Calculation (min)  75 min  -SB      PT Received On  11/07/19  -SB      PT Goal Re-Cert Due Date  11/17/19  -SB        User Key  (r) = Recorded By, (t) = Taken By, (c) = Cosigned By    Initials Name Provider Type    SB Evelyn Moran PT DPT Physical Therapist        Therapy Charges for Today     Code Description Service Date Service Provider Modifiers Qty    66692401908 HC PT EVAL MOD COMPLEXITY 4 11/7/2019  Evelyn Moran, PT DPT GP 1    85028432891 HC PT THERAPEUTIC ACT EA 15 MIN 11/7/2019 Evelyn Moran, PT DPT GP 1          PT G-Codes  Outcome Measure Options: (P) AM-PAC 6 Clicks Daily Activity (OT)  AM-PAC 6 Clicks Score (PT): 18  AM-PAC 6 Clicks Score (OT): (P) 19    Evelyn Moran PT DPT  11/7/2019

## 2019-11-07 NOTE — PLAN OF CARE
Problem: Patient Care Overview  Goal: Plan of Care Review  Outcome: Ongoing (interventions implemented as appropriate)   11/07/19 5347   Coping/Psychosocial   Plan of Care Reviewed With patient   Plan of Care Review   Progress no change   OTHER   Outcome Summary OT eval complete. Pt fowlers, A&Ox4 upon entry, and agreeable to therapy upon entry. Pt completed bed mobility to come to EOB with SBA for sup>sit and Adryan for scooting to edge. Pt completed LBD at EOB wiht MaxA for donning socks. Pt completed sit<>stand with Adryan and VCs. Pt was able to take about 5 steps to chair with CGA and VCs for RW placement. Pt required x2 attempts d/t nausea. Pt demo's WFL strength and AROM. Pt requires increased assistance with ADLs, txfrs, and fxnl mobility from her baseline. Skilled OT warranted to address these deficits to increase ADL (I) and safety awareness to facillitate return to PLOF. Recommend D/C to home w/assist and HH/OP services.

## 2019-11-07 NOTE — PLAN OF CARE
Problem: Patient Care Overview  Goal: Plan of Care Review  Outcome: Ongoing (interventions implemented as appropriate)   11/07/19 1604   Coping/Psychosocial   Plan of Care Reviewed With patient   Plan of Care Review   Progress improving   OTHER   Outcome Summary A&OX4. C/o pain. Prn PO pain medication given with good relief. Morphine PCA D/C per MD orders. Dressing changed, CDI. HV D/C. Voiding. Knee education at bedside. Moderate edema in BLE. Pulses palpable. L foot dorsiflexion very weak/borderline absent. BP hypotensive, pt is asymptomatic. MD is aware of both. VSS. Safety maintained. Will continue to monitor.      Goal: Individualization and Mutuality  Outcome: Ongoing (interventions implemented as appropriate)    Goal: Discharge Needs Assessment  Outcome: Ongoing (interventions implemented as appropriate)    Goal: Interprofessional Rounds/Family Conf  Outcome: Ongoing (interventions implemented as appropriate)      Problem: Fall Risk (Adult)  Goal: Absence of Fall  Outcome: Ongoing (interventions implemented as appropriate)

## 2019-11-07 NOTE — PROGRESS NOTES
Patient 1 day post left total knee replacement.  She is progressing satisfactorily up.  Oddly she is unable to dorsiflex her left toe and foot.  And plantarflex normally.  Did have a block performed prior to the feel observation is in order continue with physical therapy

## 2019-11-08 PROCEDURE — 97530 THERAPEUTIC ACTIVITIES: CPT

## 2019-11-08 PROCEDURE — 97116 GAIT TRAINING THERAPY: CPT

## 2019-11-08 PROCEDURE — 97110 THERAPEUTIC EXERCISES: CPT

## 2019-11-08 PROCEDURE — 97535 SELF CARE MNGMENT TRAINING: CPT

## 2019-11-08 RX ADMIN — FAMOTIDINE 20 MG: 20 TABLET, FILM COATED ORAL at 08:11

## 2019-11-08 RX ADMIN — ASPIRIN 325 MG: 325 TABLET, COATED ORAL at 08:12

## 2019-11-08 RX ADMIN — ONDANSETRON 4 MG: 4 TABLET, FILM COATED ORAL at 17:58

## 2019-11-08 RX ADMIN — HYDROCODONE BITARTRATE AND ACETAMINOPHEN 1 TABLET: 7.5; 325 TABLET ORAL at 08:11

## 2019-11-08 RX ADMIN — HYDROCODONE BITARTRATE AND ACETAMINOPHEN 1 TABLET: 7.5; 325 TABLET ORAL at 17:24

## 2019-11-08 NOTE — PLAN OF CARE
Problem: Patient Care Overview  Goal: Plan of Care Review  Outcome: Ongoing (interventions implemented as appropriate)   11/08/19 1141   Coping/Psychosocial   Plan of Care Reviewed With patient   Plan of Care Review   Progress improving      11/08/19 1141   Coping/Psychosocial   Plan of Care Reviewed With patient   Plan of Care Review   Progress improving   OTHER   Outcome Summary PT treatment: pt c/o nausea when moving L knee and soreness. Pt. performed LE ex in chair and transfers sit to stand w/ CGA and supervision. Pt. ambulated w/ gait belt and fww 12 ft x 2 w/ 3-point gait pattern w/o need for verbal cueing but had supervision, CGA. Pt. transferred stand-sit w/ s/CGA w/o need of verbal cueing. Pt. would benefit from HH.

## 2019-11-08 NOTE — PROGRESS NOTES
Continued Stay Note   [place]     Patient Name: Hailey Dickerson  MRN: [id number]  Today's Date: 11/8/2019    Admit Date: 11/6/2019    Discharge Plan     Row Name 11/08/19 1439       Plan    Plan Comments  [facility].         Discharge Codes    No documentation.             Sindi Rojas RN

## 2019-11-08 NOTE — NURSING NOTE
Pt LLE dorsiflexion is still absent at shift change assessment, but toes show some upward flexion and more movement. Plantarflexion still weak but present. Peripheral pulses good.

## 2019-11-08 NOTE — PLAN OF CARE
Problem: Patient Care Overview  Goal: Plan of Care Review  Outcome: Ongoing (interventions implemented as appropriate)   11/08/19 0519   Coping/Psychosocial   Plan of Care Reviewed With patient   Plan of Care Review   Progress no change   OTHER   Outcome Summary Pt A&o X4, c/o mild to moderate pain throughout shift, PRN for pain given with good effect noted. LLE dorsiflextion is absent, Md notified by previous shift nurse, Marsha GONZALEZ . No change, plantar flextion still weak but present. IV pulled out by patient. up X1 to bedside commode. VSS, Safety maintained.        Problem: Fall Risk (Adult)  Goal: Absence of Fall  Outcome: Ongoing (interventions implemented as appropriate)      Problem: Pain, Chronic (Adult)  Goal: Identify Related Risk Factors and Signs and Symptoms  Outcome: Ongoing (interventions implemented as appropriate)

## 2019-11-08 NOTE — THERAPY TREATMENT NOTE
Acute Care - Physical Therapy Treatment Note  [facility]     Patient Name: Hailey Dickerson  : 1940  MRN: [id number]  Today's Date: 2019  Onset of Illness/Injury or Date of Surgery: 19     Referring Physician: MD Jefry    Admit Date: 2019    Visit Dx:    ICD-10-CM ICD-9-CM   1. Impaired mobility and ADLs Z74.09 799.89   2. Impaired mobility Z74.09 799.89     Patient Active Problem List   Diagnosis   • Essential hypertension   • Anxiety   • GERD (gastroesophageal reflux disease)   • Primary osteoarthritis of left knee   • Osteoarthritis of left knee       Therapy Treatment    Rehabilitation Treatment Summary     Row Name 19 1400 19 1102 19 1005       Treatment Time/Intention    Discipline  physical therapy assistant  (Pended)   -  physical therapy assistant  -,MW,2  occupational therapy assistant  -TS    Document Type  therapy note (daily note)  (Pended)   -  therapy note (daily note)  -,MW,2  therapy note (daily note)  -TS    Subjective Information  complains of;nausea/vomiting  (Pended)   -  complains of;nausea/vomiting;pain  -,MW,LH2  complains of;fatigue;nausea/vomiting nausea post ambulation   -TS2    Mode of Treatment  physical therapy  (Pended)   -  physical therapy  -,MW,LH2  --    Patient/Family Observations  no family present  (Pended)   -  daughter present  -,MW,LH2  --    Patient Effort  good  (Pended)   -  good  -,MW,LH2  good  -TS2    Existing Precautions/Restrictions  fall;left  (Pended)  knee  -  fall  -,MW,2  fall  -TS2    Equipment Issued to Patient  gait belt;walker, front wheeled  (Pended)   -  gait belt;walker, front wheeled  -,MW,LH2  --    Recorded by [MW] Conchita Carter PTA Student 19 1503 [,,LH2] Jc Mcmillan, PT (r) Conchita Carter PTA Student (t) Jc Mcmillan, PT (c) 19 1318 [TS] Salina Garcia COTA/L 19 1005  [TS2] Salina Garcia COTA/L 19 1037    Row Name 19 0755              Treatment Time/Intention    Discipline  --  -TS      Document Type  --  -TS      Recorded by [TS] Salina Garcia COTA/L 11/08/19 0801      Row Name 11/08/19 1005             Cognitive Assessment/Intervention- PT/OT    Personal Safety Interventions  fall prevention program maintained;gait belt;nonskid shoes/slippers when out of bed  -TS      Recorded by [TS] Salina Garcia COTA/L 11/08/19 1037      Row Name 11/08/19 1005             Functional Mobility    Functional Mobility- Ind. Level  standby assist;supervision required  -TS      Functional Mobility- Device  rolling walker  -TS      Functional Mobility- Comment  in room, in BR  -TS      Recorded by [TS] Salina Garcia COTA/L 11/08/19 1059      Row Name 11/08/19 1005             Transfer Assessment/Treatment    Transfer Assessment/Treatment  sit-stand transfer;stand-sit transfer;toilet transfer  -TS      Comment (Transfers)  discussed BSC over standard commode for increased stability during t/fs along with elevating seats at home for increased ease of t/f  -TS      Recorded by [TS] Salina Garcia COTA/L 11/08/19 1059      Row Name 11/08/19 1400 11/08/19 1102 11/08/19 1005       Sit-Stand Transfer    Sit-Stand [place] (Transfers)  supervision;contact guard  (Pended)   -  supervision;verbal cues;contact guard  -,MW,LH2  stand by assist  -TS    Assistive Device (Sit-Stand Transfers)  walker, front-wheeled  (Pended)   -  walker, front-wheeled  -,MW,LH2  walker, front-wheeled  -TS    Recorded by [MW] Conchita Carter PTA Student 11/08/19 1503 [LH,MW,LH2] Jc Mcmillan, PT (r) Conchita Carter PTA Student (t) Jc Mcmillan, PT (c) 11/08/19 1318 [TS] Salina Garcia CARDONA/L 11/08/19 1059    Row Name 11/08/19 1400 11/08/19 1102 11/08/19 1005       Stand-Sit Transfer    Stand-Sit [place] (Transfers)  contact guard;supervision  (Pended)   -  supervision;verbal cues;contact guard  -NORA,CAREY,LH2  stand by assist  -TS     Assistive Device (Stand-Sit Transfers)  walker, front-wheeled  (Pended)   -MW  walker, front-wheeled  -LH,MW,LH2  walker, front-wheeled  -TS    Recorded by [MW] Conchita Carter PTA Student 11/08/19 1503 [LH,MW,LH2] Jc Mcmillan, PT (r) Conchita Carter PTA Student (t) Jc Mcmillan, PT (c) 11/08/19 1318 [TS] Salina Garcia, CARDONA/L 11/08/19 1059    Row Name 11/08/19 1005             Toilet Transfer    Type (Toilet Transfer)  sit-stand;stand-sit  -TS      [place] Level (Toilet Transfer)  supervision  -TS      Assistive Device (Toilet Transfer)  commode;grab bars/safety frame;walker, 4-wheeled  -TS      Recorded by [TS] Salina Garcia CARDONA/L 11/08/19 1059      Row Name 11/08/19 1102             Gait/Stairs Assessment/Training    Gait/Stairs Assessment/Training  gait/ambulation assistive device  -LH,MW,LH2      [place] Level (Gait)  supervision;contact guard  -LH,MW,LH2      Assistive Device (Gait)  walker, front-wheeled  -LH,MW,LH2      Distance in Feet (Gait)  12 x2  -LH,MW,LH2      Pattern (Gait)  3-point  -LH,MW,LH2      Comment (Gait/Stairs)  no need for verbal cueing through gait, pt performed correctly  -LH,MW,LH2      Recorded by [LH,MW,LH2] Jc Mcmillan, PT (r) Conchita Carter PTA Student (t) Jc Mcmillan, PT (c) 11/08/19 1318      Row Name 11/08/19 1005             ADL Assessment/Intervention    BADL Assessment/Intervention  lower body dressing;toileting  -TS      Recorded by [TS] Salina Garcia CARDONA/L 11/08/19 1059      Row Name 11/08/19 1005             Lower Body Dressing Assessment/Training    Lower Body Dressing [place] Level  don;socks;set up;contact guard assist  -TS      Lower Body Dressing Position  edge of bed sitting  -TS      Recorded by [TS] Salina Garcia CARDONA/L 11/08/19 1059      Row Name 11/08/19 1005             Toileting Assessment/Training    [place] Level (Toileting)  toileting skills;perform perineal hygiene;adjust/manage clothing;supervision  -TS       Assistive Devices (Toileting)  commode;grab bar/safety frame  -TS      Toileting Position  unsupported sitting;supported standing  -TS      Recorded by [TS] Salina Garcia COTA/L 11/08/19 1059      Row Name 11/08/19 1400 11/08/19 1102          Lower Extremity Supine Therapeutic Exercise    Performed, Supine Lower Extremity (Therapeutic Exercise)  quadriceps sets;gluteal sets;ankle pumps;heel slides  (Pended)   -MW  heel slides;ankle pumps;quadriceps sets  -LH,MW,LH2     Exercise Type, Supine Lower Extremity (Therapeutic Exercise)  AROM (active range of motion)  (Pended)   -MW  AROM (active range of motion)  -LH,MW,LH2     Sets/Reps Detail, Supine Lower Extremity (Therapeutic Exercise)  10  (Pended)   -MW  20  -LH,MW,LH2     Comment, Supine Lower Extremity (Therapeutic Exercise)  no active dorsiflexion L foot  (Pended)   -MW  no active dorsiflexion on L foot  -LH,MW,LH2     Recorded by [MW] Conchita Carter PTA Student 11/08/19 1503 [LH,MW,LH2] Jc Mcmillan, PT (r) Conchita Carter PTA Student (t) Jc Mcmillan, PT (c) 11/08/19 1318     Row Name 11/08/19 1400 11/08/19 1102          Therapeutic Exercise    Lower Extremity (Therapeutic Exercise)  gluteal sets;heel slides, bilateral;LAQ (long arc quad), bilateral;marching while seated;quad sets, bilateral  (Pended)   -MW  gluteal sets;heel slides, bilateral;LAQ (long arc quad), bilateral;marching while seated;quad sets, bilateral  -LH,MW,LH2     Exercise Type (Therapeutic Exercise)  isometric contraction, static;AROM (active range of motion)  (Pended)   -MW  isometric contraction, static;AROM (active range of motion)  -LH,MW,LH2     Position (Therapeutic Exercise)  seated  (Pended)   -MW  seated  -LH,MW,LH2     Sets/Reps (Therapeutic Exercise)  10  (Pended)   -MW  20  -LH,MW,LH2     Recorded by [MW] Conchita Carter PTA Student 11/08/19 1503 [LH,MW,LH2] Jc Mcmillan, PT (r) Raul, Conchita, PTA Student (t) Jc Mcmillan, PT (c) 11/08/19 1318     Row Name 11/08/19 1400 11/08/19  1102 11/08/19 1005       Positioning and Restraints    Pre-Treatment Position  sitting in chair/recliner  (Pended)   -MW  sitting in chair/recliner  -LH,MW,LH2  in bed  -TS    Post Treatment Position  bed  (Pended)   -MW  chair  -LH,MW,LH2  chair  -TS    In Bed  fowlers;call light within reach;encouraged to call for assist  (Pended)   -MW  --  --    In Chair  --  encouraged to call for assist;reclined;sitting;with family/caregiver;call light within reach  -LH,MW,LH2  reclined;call light within reach;encouraged to call for assist;notified nsg  -TS    Recorded by [MW] Conchita Carter PTA Student 11/08/19 1503 [LH,MW,LH2] Jc Mcmillan, PT (r) Conchita Carter PTA Student (t) Jc Mcmillan, PT (c) 11/08/19 1318 [TS] Salina Garcia, CARDONA/L 11/08/19 1059    Row Name 11/08/19 1400 11/08/19 1102 11/08/19 1005       Pain Scale: Numbers Pre/Post-Treatment    Pain Scale: Numbers, Pretreatment  0/10 - no pain  (Pended)   -MW  0/10 - no pain  -LH,MW,LH2  2/10  -TS    Pain Scale: Numbers, Post-Treatment  3/10  (Pended)   -MW  3/10  -LH,MW,LH2  3/10  -TS    Pain Location - Side  Left  (Pended)   -MW  Left  -LH,MW,LH2  Left  -TS    Pain Location  knee  (Pended)   -MW  knee  -LH,MW,LH2  knee  -TS    Pain Intervention(s)  --  Medication (See MAR)  -LH,MW,LH2  Repositioned  -TS    Recorded by [MW] Conchita Carter PTA Student 11/08/19 1503 [LH,MW,LH2] Jc Mcmillan, PT (r) Conchita Carter PTA Student (t) Jc Mcmillan, PT (c) 11/08/19 1318 [TS] Salina Garcia, CARDONA/L 11/08/19 1059    Row Name                Wound 11/06/19 1139 Left knee Incision    Wound - Properties Group Date first assessed: 11/06/19 [JH] Time first assessed: 1139 [JH] Side: Left [JH] Location: knee [] Primary Wound Type: Incision [] Recorded by:  [] Marii Álvarez RN 11/06/19 1139    Row Name 11/08/19 1005             Outcome Summary/Treatment Plan (OT)    Daily Summary of Progress (OT)  progress toward functional goals is good  -TS      Recorded by [TS]  Salina Garcia, CARDONA/L 11/08/19 1059        User Key  (r) = Recorded By, (t) = Taken By, (c) = Cosigned By    Initials Name Effective Dates Discipline     Jc Mcmillan, PT 08/02/16 -  PT    TS Salina Garcia, CARDONA/L 08/02/16 -  OT    Marii Fraser RN 06/07/17 -  Nurse    Conchita Rhodes PTA Student 11/06/19 -  PT          Wound 11/06/19 1139 Left knee Incision (Active)   Dressing Appearance dry;intact;no drainage 11/8/2019  8:07 AM   Closure Sutures;ITZEL 11/7/2019  8:02 PM   Base dressing in place, unable to visualize 11/8/2019  8:07 AM   Drainage Amount none 11/8/2019  8:07 AM   Dressing Care, Wound gauze 11/8/2019  8:07 AM           Physical Therapy Education     Title: PT OT SLP Therapies (Done)     Topic: Physical Therapy (Done)     Point: Mobility training (Done)     Learning Progress Summary           Patient Acceptance, E, VU,NR by SB at 11/7/2019 12:06 PM    Comment:  pt edu on knee HEP, use of RW, safety precautions, POC, benefits of act, d/c plans                   Point: Home exercise program (Done)     Learning Progress Summary           Patient Acceptance, E,TB,D,H, VU,DU by CAREY at 11/8/2019 11:41 AM    Comment:  LE HEP    Acceptance, E, VU,NR by SB at 11/7/2019 12:06 PM    Comment:  pt edu on knee HEP, use of RW, safety precautions, POC, benefits of act, d/c plans                   Point: Body mechanics (Done)     Learning Progress Summary           Patient Acceptance, E, VU,NR by SB at 11/7/2019 12:06 PM    Comment:  pt edu on knee HEP, use of RW, safety precautions, POC, benefits of act, d/c plans                   Point: Precautions (Done)     Learning Progress Summary           Patient Acceptance, E, VU,NR by SB at 11/7/2019 12:06 PM    Comment:  pt edu on knee HEP, use of RW, safety precautions, POC, benefits of act, d/c plans                               User Key     Initials Effective Dates Name Provider Type Discipline     10/31/19 -  Evelyn Moran, PT DPT Physical  Therapist PT     11/06/19 -  Conchita Carter, PTA Student PTA Student PT                PT Recommendation and Plan     Plan of Care Reviewed With: patient  Progress: improving  Outcome Summary: PT treatment: pt c/o nausea when moving L knee and soreness. Pt. performed LE ex in chair and transfers sit to stand w/ CGA and supervision. Pt. ambulated w/ gait belt and fww 12 ft x 2 w/ 3-point gait pattern w/o need for verbal cueing but had supervision, CGA. Pt. transferred stand-sit w/ s/CGA w/o need of verbal cueing. Pt. would benefit from HH.   Outcome Measures     Row Name 11/08/19 1000 11/07/19 0900          How much help from another is currently needed...    Putting on and taking off regular lower body clothing?  3  -TS  2  -MW (r) AB (t) MW (c)     Bathing (including washing, rinsing, and drying)  3  -TS  2  -MW (r) AB (t) MW (c)     Toileting (which includes using toilet bed pan or urinal)  3  -TS  3  -MW (r) AB (t) MW (c)     Putting on and taking off regular upper body clothing  4  -TS  4  -MW (r) AB (t) MW (c)     Taking care of personal grooming (such as brushing teeth)  4  -TS  4  -MW (r) AB (t) MW (c)     Eating meals  4  -TS  4  -MW (r) AB (t) MW (c)     AM-PAC 6 Clicks Score (OT)  21  -TS  19  -MW (r) AB (t)        Functional Assessment    Outcome Measure Options  --  AM-PAC 6 Clicks Daily Activity (OT)  -MW (r) AB (t) MW (c)       User Key  (r) = Recorded By, (t) = Taken By, (c) = Cosigned By    Initials Name Provider Type    TS Salina Garcia, CARDONA/L Occupational Therapy Assistant    Flora Mehta, OTR/L Occupational Therapist    AB Rosa Palmer, OT Student OT Student         Time Calculation:   PT Charges     Row Name 11/08/19 1503 11/08/19 1138          Time Calculation    Start Time  1400  (Pended)   -MW  1102  -LH (r) MW (t) LH (c)     Stop Time  1423  (Pended)   -MW  1127  -LH (r) MW (t) LH (c)     Time Calculation (min)  23 min  (Pended)   -MW  25 min  -LH (r) MW (t)     PT Received On   11/08/19  (Pended)   -MW  11/08/19  - (r) MW (t)  (c)     PT Goal Re-Cert Due Date  11/17/19  (Pended)   -MW  11/17/19  -LH (r) MW (t) LH (c)        Time Calculation- PT    Total Timed Code Minutes- PT  23 minute(s)  (Pended)   -MW  25 minute(s)  -LH (r) MW (t)  (c)        Timed Charges    54747 - PT Therapeutic Exercise Minutes  15  (Pended)   -MW  15  - (r) MW (t)  (c)     03731 - Gait Training Minutes   --  10  -LH (r) MW (t)  (c)     96091 - PT Therapeutic Activity Minutes  8  (Pended)   -MW  --       User Key  (r) = Recorded By, (t) = Taken By, (c) = Cosigned By    Initials Name Provider Type     Jc Mcmillan, PT Physical Therapist    Conchita Rhodes PTA Student CLYDE Student        Therapy Charges for Today     Code Description Service Date Service Provider Modifiers Qty    80598489621 HC PT THER PROC EA 15 MIN 11/8/2019 Conchita Carter PTA Student GP 1    63343575787 HC GAIT TRAINING EA 15 MIN 11/8/2019 Conchita Carter PTA Student GP 1    87522163752 HC PT THER PROC EA 15 MIN 11/8/2019 Conchita Carter PTA Student GP 1    24496432026 HC PT THERAPEUTIC ACT EA 15 MIN 11/8/2019 Conchita Carter PTA Student GP 1          PT G-Codes  Outcome Measure Options: AM-PAC 6 Clicks Daily Activity (OT)  AM-PAC 6 Clicks Score (PT): 18  AM-PAC 6 Clicks Score (OT): 21    Conchita Carter PTA Student  11/8/2019

## 2019-11-08 NOTE — THERAPY TREATMENT NOTE
Acute Care - Occupational Therapy Treatment Note  [facility]     Patient Name: Hailey Dickerson  : 1940  MRN: [id number]  Today's Date: 2019  Onset of Illness/Injury or Date of Surgery: 19  Date of Referral to OT: 19  Referring Physician: MD Jefry    Admit Date: 2019       ICD-10-CM ICD-9-CM   1. Impaired mobility and ADLs Z74. 799.89   2. Impaired mobility Z74. 799.89     Patient Active Problem List   Diagnosis   • Essential hypertension   • Anxiety   • GERD (gastroesophageal reflux disease)   • Primary osteoarthritis of left knee   • Osteoarthritis of left knee     Past Medical History:   Diagnosis Date   • Anxiety    • Arthritis    • GERD (gastroesophageal reflux disease)    • Hypertension      Past Surgical History:   Procedure Laterality Date   • ANKLE SURGERY     • CHOLECYSTECTOMY     • DILATATION AND CURETTAGE     • TOTAL KNEE ARTHROPLASTY Left 2019    Procedure: TOTAL KNEE REPLACEMENT;  Surgeon: Rashaad Capps MD;  Location: [facility];  Service: Orthopedics       Therapy Treatment    Rehabilitation Treatment Summary     Row Name 19 1005 19 0755          Treatment Time/Intention    Discipline  occupational therapy assistant  -TS  --  -TS     Document Type  therapy note (daily note)  -TS  --  -TS     Subjective Information  complains of;fatigue;nausea/vomiting nausea post ambulation   -TS2  --     Patient Effort  good  -TS2  --     Existing Precautions/Restrictions  fall  -TS2  --     Recorded by [TS] Salina Garcia COTA/L 19 1005  [TS2] Salina Garcia CARDONA/JOSÉ 19 1037 [TS] Salina Garcia COTA/L 19 0801     Row Name 19 1005             Cognitive Assessment/Intervention- PT/OT    Personal Safety Interventions  fall prevention program maintained;gait belt;nonskid shoes/slippers when out of bed  -TS      Recorded by [TS] Salina Garcia COTA/L 19 1037      Row Name 19 1005             Functional  Mobility    Functional Mobility- Ind. Level  standby assist;supervision required  -TS      Functional Mobility- Device  rolling walker  -TS      Functional Mobility- Comment  in room, in BR  -TS      Recorded by [TS] Salina Garcia COTA/L 11/08/19 1059      Row Name 11/08/19 1005             Transfer Assessment/Treatment    Transfer Assessment/Treatment  sit-stand transfer;stand-sit transfer;toilet transfer  -TS      Comment (Transfers)  discussed BSC over standard commode for increased stability during t/fs along with elevating seats at home for increased ease of t/f  -TS      Recorded by [TS] Salina Garcia COTA/L 11/08/19 1059      Row Name 11/08/19 1005             Sit-Stand Transfer    Sit-Stand [place] (Transfers)  stand by assist  -TS      Assistive Device (Sit-Stand Transfers)  walker, front-wheeled  -TS      Recorded by [TS] Salina Garcia CARDONA/L 11/08/19 1059      Row Name 11/08/19 1005             Stand-Sit Transfer    Stand-Sit [place] (Transfers)  stand by assist  -TS      Assistive Device (Stand-Sit Transfers)  walker, front-wheeled  -TS      Recorded by [TS] Salina Garcia CARDONA/L 11/08/19 1059      Row Name 11/08/19 1005             Toilet Transfer    Type (Toilet Transfer)  sit-stand;stand-sit  -TS      [place] Level (Toilet Transfer)  supervision  -TS      Assistive Device (Toilet Transfer)  commode;grab bars/safety frame;walker, 4-wheeled  -TS      Recorded by [TS] Salina Garcia COTA/L 11/08/19 1059      Row Name 11/08/19 1005             ADL Assessment/Intervention    BADL Assessment/Intervention  lower body dressing;toileting  -TS      Recorded by [TS] Salina Garcia CARDONA/L 11/08/19 1059      Row Name 11/08/19 1005             Lower Body Dressing Assessment/Training    Lower Body Dressing [place] Level  don;socks;set up;contact guard assist  -TS      Lower Body Dressing Position  edge of bed sitting  -TS      Recorded by [TS]  Salina Garcia CARDONA/L 11/08/19 1059      Row Name 11/08/19 1005             Toileting Assessment/Training    [place] Level (Toileting)  toileting skills;perform perineal hygiene;adjust/manage clothing;supervision  -TS      Assistive Devices (Toileting)  commode;grab bar/safety frame  -TS      Toileting Position  unsupported sitting;supported standing  -TS      Recorded by [TS] Salina Garcia COTA/L 11/08/19 1059      Row Name 11/08/19 1005             Positioning and Restraints    Pre-Treatment Position  in bed  -TS      Post Treatment Position  chair  -TS      In Chair  reclined;call light within reach;encouraged to call for assist;notified nsg  -TS      Recorded by [TS] Salina Garcia COTA/L 11/08/19 1059      Row Name 11/08/19 1005             Pain Scale: Numbers Pre/Post-Treatment    Pain Scale: Numbers, Pretreatment  2/10  -TS      Pain Scale: Numbers, Post-Treatment  3/10  -TS      Pain Location - Side  Left  -TS      Pain Location  knee  -TS      Pain Intervention(s)  Repositioned  -TS      Recorded by [TS] Salina Garcia CARDONA/L 11/08/19 1059      Row Name                Wound 11/06/19 1139 Left knee Incision    Wound - Properties Group Date first assessed: 11/06/19 [] Time first assessed: 1139 [JH] Side: Left [JH] Location: knee [JH] Primary Wound Type: Incision [JH] Recorded by:  [] Marii Álvarez RN 11/06/19 1139    Row Name 11/08/19 1005             Outcome Summary/Treatment Plan (OT)    Daily Summary of Progress (OT)  progress toward functional goals is good  -TS      Recorded by [TS] Salina Garcia CARDONA/JOSÉ 11/08/19 1059        User Key  (r) = Recorded By, (t) = Taken By, (c) = Cosigned By    Initials Name Effective Dates Discipline    TS Salina Garcia CARDONA/L 08/02/16 -  OT     Marii Álvarez RN 06/07/17 -  Nurse        Wound 11/06/19 1139 Left knee Incision (Active)   Dressing Appearance dry;intact;no drainage 11/8/2019  8:07 AM   Closure  Sutures;ITZEL 11/7/2019  8:02 PM   Base dressing in place, unable to visualize 11/8/2019  8:07 AM   Drainage Amount none 11/8/2019  8:07 AM   Dressing Care, Wound gauze 11/8/2019  8:07 AM       Occupational Therapy Education     Title: PT OT SLP Therapies (Done)     Topic: Occupational Therapy (Done)     Point: ADL training (Done)     Description: Instruct learner(s) on proper safety adaptation and remediation techniques during self care or transfers.   Instruct in proper use of assistive devices.    Learning Progress Summary           Patient Acceptance, E, VU by AB at 11/7/2019  9:27 AM    Comment:  OT POC, safety awareness, risks/benefits, ADL, txfrs, D/C plans                   Point: Precautions (Done)     Description: Instruct learner(s) on prescribed precautions during self-care and functional transfers.    Learning Progress Summary           Patient Acceptance, E, VU by AB at 11/7/2019  9:27 AM    Comment:  OT POC, safety awareness, risks/benefits, ADL, txfrs, D/C plans                   Point: Body mechanics (Done)     Description: Instruct learner(s) on proper positioning and spine alignment during self-care, functional mobility activities and/or exercises.    Learning Progress Summary           Patient Acceptance, E, VU by AB at 11/7/2019  9:27 AM    Comment:  OT POC, safety awareness, risks/benefits, ADL, txfrs, D/C plans                               User Key     Initials Effective Dates Name Provider Type Discipline    AB 07/25/19 -  Rosa Palmer, OT Student OT Student OT                OT Recommendation and Plan  Outcome Summary/Treatment Plan (OT)  Daily Summary of Progress (OT): progress toward functional goals is good  Daily Summary of Progress (OT): progress toward functional goals is good  Outcome Measures     Row Name 11/08/19 1000 11/07/19 0900          How much help from another is currently needed...    Putting on and taking off regular lower body clothing?  3  -TS  2  -MW (r) AB (t) MW (c)      Bathing (including washing, rinsing, and drying)  3  -TS  2  -MW (r) AB (t) MW (c)     Toileting (which includes using toilet bed pan or urinal)  3  -TS  3  -MW (r) AB (t) MW (c)     Putting on and taking off regular upper body clothing  4  -TS  4  -MW (r) AB (t) MW (c)     Taking care of personal grooming (such as brushing teeth)  4  -TS  4  -MW (r) AB (t) MW (c)     Eating meals  4  -TS  4  -MW (r) AB (t) MW (c)     AM-PAC 6 Clicks Score (OT)  21  -TS  19  -MW (r) AB (t)        Functional Assessment    Outcome Measure Options  --  AM-PAC 6 Clicks Daily Activity (OT)  -MW (r) AB (t) MW (c)       User Key  (r) = Recorded By, (t) = Taken By, (c) = Cosigned By    Initials Name Provider Type    TS Salina Garcia COTA/L Occupational Therapy Assistant     Flora Dean, OTR/L Occupational Therapist    AB Rosa aPlmer, OT Student OT Student           Time Calculation:   Time Calculation- OT     Row Name 11/08/19 1100             Time Calculation- OT    OT Start Time  1005  -TS      OT Stop Time  1050  -TS      OT Time Calculation (min)  45 min  -TS      Total Timed Code Minutes- OT  45 minute(s)  -TS      OT Received On  11/08/19  -TS         Timed Charges    96778 - OT Self Care/Mgmt Minutes  45  -TS        User Key  (r) = Recorded By, (t) = Taken By, (c) = Cosigned By    Initials Name Provider Type    TS Salina Garcia COTA/L Occupational Therapy Assistant        Therapy Charges for Today     Code Description Service Date Service Provider Modifiers Qty    81997561055 HC OT SELF CARE/MGMT/TRAIN EA 15 MIN 11/8/2019 Salina Garcia COTA/L GO 3               Salina LAURENT. JW Garcia  11/8/2019

## 2019-11-08 NOTE — PLAN OF CARE
Problem: Patient Care Overview  Goal: Plan of Care Review  Outcome: Ongoing (interventions implemented as appropriate)   11/08/19 7413   Coping/Psychosocial   Plan of Care Reviewed With patient;family   Plan of Care Review   Progress improving   OTHER   Outcome Summary A&OX4. C/o pain in LLE. LLE dorsiflexion still absent, MD aware. Continue to monitor. Upx1 with RW. VSS, PRN pain meds given. Safety maintained. Total knee education performed.      Goal: Individualization and Mutuality  Outcome: Ongoing (interventions implemented as appropriate)      Problem: Fall Risk (Adult)  Goal: Absence of Fall  Outcome: Ongoing (interventions implemented as appropriate)      Problem: Pain, Chronic (Adult)  Goal: Acceptable Pain/Comfort Level and Functional Ability  Outcome: Ongoing (interventions implemented as appropriate)

## 2019-11-08 NOTE — PROGRESS NOTES
Continued Stay Note  [facility]     Patient Name: Hailey Dickerson  MRN: [id number]  Today's Date: 11/8/2019    Admit Date: 11/6/2019    Discharge Plan     Row Name 11/08/19 1000       Plan    Plan Comments  Pt plans on discharging home with her spouse when medically stable and is requesting [facility] at discharge. SW will follow and set up HH when ordered at discharge.         Discharge Codes    No documentation.             Majo Cunningham

## 2019-11-08 NOTE — PROGRESS NOTES
Patient is 2 days postoperative for left total knee replacement.  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  Plan continue with therapy possible home in a.m.

## 2019-11-09 PROCEDURE — 97116 GAIT TRAINING THERAPY: CPT

## 2019-11-09 PROCEDURE — 97110 THERAPEUTIC EXERCISES: CPT

## 2019-11-09 RX ORDER — SENNOSIDES 8.6 MG
2 TABLET ORAL NIGHTLY
Status: DISCONTINUED | OUTPATIENT
Start: 2019-11-09 | End: 2019-11-10 | Stop reason: HOSPADM

## 2019-11-09 RX ORDER — POLYETHYLENE GLYCOL 3350 17 G/17G
17 POWDER, FOR SOLUTION ORAL DAILY
Status: DISCONTINUED | OUTPATIENT
Start: 2019-11-09 | End: 2019-11-10 | Stop reason: HOSPADM

## 2019-11-09 RX ORDER — HYDROCODONE BITARTRATE AND ACETAMINOPHEN 7.5; 325 MG/1; MG/1
1 TABLET ORAL EVERY 6 HOURS PRN
Qty: 40 TABLET | Refills: 0 | Status: SHIPPED | OUTPATIENT
Start: 2019-11-09 | End: 2019-11-16

## 2019-11-09 RX ORDER — ONDANSETRON 4 MG/1
4 TABLET, FILM COATED ORAL EVERY 8 HOURS PRN
Qty: 30 TABLET | Refills: 0 | Status: SHIPPED | OUTPATIENT
Start: 2019-11-09 | End: 2020-11-05

## 2019-11-09 RX ADMIN — ONDANSETRON 4 MG: 4 TABLET, FILM COATED ORAL at 03:54

## 2019-11-09 RX ADMIN — HYDROCODONE BITARTRATE AND ACETAMINOPHEN 1 TABLET: 7.5; 325 TABLET ORAL at 15:01

## 2019-11-09 RX ADMIN — FAMOTIDINE 20 MG: 20 TABLET, FILM COATED ORAL at 08:49

## 2019-11-09 RX ADMIN — LISINOPRIL 10 MG: 10 TABLET ORAL at 08:49

## 2019-11-09 RX ADMIN — HYDROCODONE BITARTRATE AND ACETAMINOPHEN 1 TABLET: 7.5; 325 TABLET ORAL at 20:34

## 2019-11-09 RX ADMIN — ONDANSETRON 4 MG: 4 TABLET, FILM COATED ORAL at 10:33

## 2019-11-09 RX ADMIN — HYDROCODONE BITARTRATE AND ACETAMINOPHEN 1 TABLET: 7.5; 325 TABLET ORAL at 08:54

## 2019-11-09 RX ADMIN — ASPIRIN 325 MG: 325 TABLET, COATED ORAL at 08:49

## 2019-11-09 RX ADMIN — POLYETHYLENE GLYCOL 3350 17 G: 17 POWDER, FOR SOLUTION ORAL at 14:56

## 2019-11-09 RX ADMIN — ONDANSETRON 4 MG: 4 TABLET, FILM COATED ORAL at 16:47

## 2019-11-09 NOTE — THERAPY TREATMENT NOTE
Acute Care - Physical Therapy Treatment Note  [facility]     Patient Name: Hailey Dickerson  : 1940  MRN: [id number]  Today's Date: 2019  Onset of Illness/Injury or Date of Surgery: 19     Referring Physician: MD Jefry    Admit Date: 2019    Visit Dx:    ICD-10-CM ICD-9-CM   1. Impaired mobility and ADLs Z74.09 799.89   2. Impaired mobility Z74.09 799.89     Patient Active Problem List   Diagnosis   • Essential hypertension   • Anxiety   • GERD (gastroesophageal reflux disease)   • Primary osteoarthritis of left knee   • Osteoarthritis of left knee       Therapy Treatment    Rehabilitation Treatment Summary     Row Name 1910             Treatment Time/Intention    Discipline  physical therapy assistant  -KJ      Document Type  therapy note (daily note)  -KJ2      Subjective Information  no complaints  -KJ2      Mode of Treatment  physical therapy  -KJ2      Patient Effort  good  -KJ2      Existing Precautions/Restrictions  fall;left  -KJ2      Treatment Considerations/Comments  TKR; dorsiflexion palsy L since sx  -KJ2      Recorded by [KJ] Gabi Stewart, PTA 19 0820  [KJ2] Gabi Stewart, PTA 19 0853      Row Name 19 0810             Bed Mobility Assessment/Treatment    Supine-Sit [place] (Bed Mobility)  independent  -KJ      Recorded by [KJ] Gabi Stewatr, PTA 19 0853      Row Name 19 0810             Sit-Stand Transfer    Sit-Stand [place] (Transfers)  verbal cues;contact guard  -KJ      Assistive Device (Sit-Stand Transfers)  walker, front-wheeled  -KJ      Recorded by [KJ] Gabi Stewart, PTA 19 0853      Row Name 19 0810             Stand-Sit Transfer    Stand-Sit [place] (Transfers)  verbal cues;supervision  -KJ      Recorded by [KJ] Gabi Stewart, PTA 19 0853      Row Name 19 0810             Gait/Stairs Assessment/Training    Gait/Stairs Assessment/Training  gait/ambulation independence  -KJ       [place] Level (Gait)  verbal cues;supervision  -KJ      Assistive Device (Gait)  walker, front-wheeled  -KJ      Distance in Feet (Gait)  40' x 2  -KJ      Deviations/Abnormal Patterns (Gait)  gait speed decreased;stride length decreased  -KJ      Left Sided Gait Deviations  heel strike decreased;foot drop/toe drag  -KJ      Recorded by [KJ] Gabi Stewart, PTA 11/09/19 0853      Row Name 11/09/19 0810             Therapeutic Exercise    Exercise Type (Therapeutic Exercise)  AROM (active range of motion)  -KJ      Position (Therapeutic Exercise)  seated  -KJ      Sets/Reps (Therapeutic Exercise)  10  -KJ      Comment (Therapeutic Exercise)  education on mm tapping ant. tib for increased dorsiflexion stimulation  -KJ      Recorded by [KJ] Gabi Stewart, PTA 11/09/19 0853      Row Name 11/09/19 0810             Positioning and Restraints    Pre-Treatment Position  in bed  -KJ      Post Treatment Position  chair  -KJ      Recorded by [KJ] Gabi Stewart, PTA 11/09/19 0853      Row Name 11/09/19 0810             Pain Scale: Numbers Pre/Post-Treatment    Pain Scale: Numbers, Pretreatment  5/10  -KJ      Pain Scale: Numbers, Post-Treatment  5/10  -KJ      Pain Location - Side  Left  -KJ      Pain Location  knee  -KJ      Recorded by [KJ] Gabi Stewart, PTA 11/09/19 0853      Row Name                Wound 11/06/19 1139 Left knee Incision    Wound - Properties Group Date first assessed: 11/06/19 [JH] Time first assessed: 1139 [JH] Side: Left [JH] Location: knee [JH] Primary Wound Type: Incision [JH] Recorded by:  [JH] Marii Álvarez RN 11/06/19 1139      User Key  (r) = Recorded By, (t) = Taken By, (c) = Cosigned By    Initials Name Effective Dates Discipline    KJ Gabi Stewart, PTA 08/02/16 -  PT    JH Marii Álvarez RN 06/07/17 -  Nurse          Wound 11/06/19 1139 Left knee Incision (Active)   Dressing Appearance dry;intact 11/8/2019  8:00 PM   Closure Sutures;ITZEL 11/8/2019  8:00 PM   Base dressing in  place, unable to visualize 11/8/2019  8:00 PM   Drainage Amount none 11/8/2019  8:00 PM           Physical Therapy Education     Title: PT OT SLP Therapies (Done)     Topic: Physical Therapy (Done)     Point: Mobility training (Done)     Learning Progress Summary           Patient Acceptance, E, VU by NS at 11/9/2019  4:32 AM    Acceptance, E, VU,NR by SB at 11/7/2019 12:06 PM    Comment:  pt edu on knee HEP, use of RW, safety precautions, POC, benefits of act, d/c plans                   Point: Home exercise program (Done)     Learning Progress Summary           Patient Acceptance, E, VU by NS at 11/9/2019  4:32 AM    Acceptance, E,TB,D,H, VU,DU by MW at 11/8/2019 11:41 AM    Comment:  LE HEP    Acceptance, E, VU,NR by SB at 11/7/2019 12:06 PM    Comment:  pt edu on knee HEP, use of RW, safety precautions, POC, benefits of act, d/c plans                   Point: Body mechanics (Done)     Learning Progress Summary           Patient Acceptance, E, VU by NS at 11/9/2019  4:32 AM    Acceptance, E, VU,NR by SB at 11/7/2019 12:06 PM    Comment:  pt edu on knee HEP, use of RW, safety precautions, POC, benefits of act, d/c plans                   Point: Precautions (Done)     Learning Progress Summary           Patient Acceptance, E, VU by NS at 11/9/2019  4:32 AM    Acceptance, E, VU,NR by SB at 11/7/2019 12:06 PM    Comment:  pt edu on knee HEP, use of RW, safety precautions, POC, benefits of act, d/c plans                               User Key     Initials Effective Dates Name Provider Type Discipline    SB 10/31/19 -  Evelyn Moran, PT DPT Physical Therapist PT    NS 07/01/19 -  Elisabeth Quigley, RNA Registered Nurse Nurse     11/06/19 -  Conchita Carter, PTA Student PTA Student PT                PT Recommendation and Plan     Plan of Care Reviewed With: patient  Progress: improving  Outcome Summary: PT tx completed. Pt supine in bed with no c/o. 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 I bed mobility, S/CG  sit<>stand, amb 40' x 2 with r wx S. Cues for gait and A.D. placement. Recommend outpatient services at time of discharge due to palsy left foot.  Outcome Measures     Row Name 11/08/19 1000 11/07/19 0900          How much help from another is currently needed...    Putting on and taking off regular lower body clothing?  3  -TS  2  -MW (r) AB (t) MW (c)     Bathing (including washing, rinsing, and drying)  3  -TS  2  -MW (r) AB (t) MW (c)     Toileting (which includes using toilet bed pan or urinal)  3  -TS  3  -MW (r) AB (t) MW (c)     Putting on and taking off regular upper body clothing  4  -TS  4  -MW (r) AB (t) MW (c)     Taking care of personal grooming (such as brushing teeth)  4  -TS  4  -MW (r) AB (t) MW (c)     Eating meals  4  -TS  4  -MW (r) AB (t) MW (c)     AM-PAC 6 Clicks Score (OT)  21  -TS  19  -MW (r) AB (t)        Functional Assessment    Outcome Measure Options  --  AM-PAC 6 Clicks Daily Activity (OT)  -MW (r) AB (t) MW (c)       User Key  (r) = Recorded By, (t) = Taken By, (c) = Cosigned By    Initials Name Provider Type    TS Salina Garcia, CARDONA/L Occupational Therapy Assistant    Flora Mehta, OTR/L Occupational Therapist    AB Rosa Palmer, OT Student OT Student         Time Calculation:   PT Charges     Row Name 11/09/19 0853             Time Calculation    Start Time  0810  -KJ      Stop Time  0833  -KJ      Time Calculation (min)  23 min  -KJ      PT Received On  11/09/19  -KJ      PT Goal Re-Cert Due Date  11/17/19  -KJ         Time Calculation- PT    Total Timed Code Minutes- PT  23 minute(s)  -KJ        User Key  (r) = Recorded By, (t) = Taken By, (c) = Cosigned By    Initials Name Provider Type    Gabi Lew PTA Physical Therapy Assistant        Therapy Charges for Today     Code Description Service Date Service Provider Modifiers Qty    16149573486 HC PT THER PROC EA 15 MIN 11/9/2019 Gabi Stewart PTA GP 1    59967584218 HC GAIT TRAINING EA 15 MIN  11/9/2019 Gabi Stewart, PTA GP 1          PT G-Codes  Outcome Measure Options: AM-PAC 6 Clicks Daily Activity (OT)  AM-PAC 6 Clicks Score (PT): 18  AM-PAC 6 Clicks Score (OT): 21    Gabi Stewart, PTA  11/9/2019

## 2019-11-09 NOTE — DISCHARGE SUMMARY
[facility]  DISCHARGE SUMMARY       Date of Admission: 11/6/2019  Date of Discharge:  11/10/19  Primary Care Physician: Titi Cotton MD    Presenting Problem/History of Present Illness:  Primary osteoarthritis of left knee [M17.12]  Osteoarthritis of left knee [M17.12]     Final Discharge Diagnoses:  Active Hospital Problems    Diagnosis   • Primary osteoarthritis of left knee   • Osteoarthritis of left knee       Consults: None    Procedures Performed: Left total knee replacement    Pertinent Test Results: YUDY globin stable    History of Present Illness on Day of Discharge: On discharge    Hospital Course:  The patient is a 79 y.o. female who presented to [facility] with need for primary osteoarthritis of the left knee.  On day of admission left total knee replaced carried out.  She is done very well with her knee.  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        /53 (BP Location: Left arm, Patient Position: Lying)   Pulse 104   Temp 98.5 °F (36.9 °C) (Oral)   Resp 17   LMP  (LMP Unknown)   SpO2 95%   Breastfeeding? No       Discharge Medications:     Discharge Medications      ASK your doctor about these medications      Instructions Start Date   alendronate 70 MG tablet  Commonly known as:  FOSAMAX   70 mg, Oral, Every 7 Days      butalbital-acetaminophen-caffeine -40 MG per tablet  Commonly known as:  FIORICET, ESGIC   One every 6 hours when necessary headache      citalopram 20 MG tablet  Commonly known as:  CeleXA   20 mg, Oral, Daily      Cranberry 125 MG tablet   1 tablet, Oral, Daily      indapamide 2.5 MG tablet  Commonly known as:  LOZOL   2.5 mg, Oral, Every Morning      lisinopril 10 MG tablet  Commonly known as:  PRINIVIL,ZESTRIL   10 mg, Oral, Daily      multivitamin with minerals tablet tablet   1 tablet, Oral,  Daily      raNITIdine 150 MG tablet  Commonly known as:  ZANTAC   150 mg, Oral, Nightly      simvastatin 20 MG tablet  Commonly known as:  ZOCOR   20 mg, Oral, Nightly             Discharge Diet: Regular  Discharge Care Plan/Instructions: #1 she can bear weight as tolerated #2 she can shower and redress wound #3 she will have home health physical therapy #4 she will be maintained on aspirin No. 5 she is to follow-up in the office    Follow-up Appointments: Monday, November 18  Future Appointments   Date Time Provider Department Center   3/31/2020  9:00 AM Titi Cotton MD [facility] None         Rashaad Capps MD  11/09/19  10:40 AM

## 2019-11-09 NOTE — PLAN OF CARE
Problem: Patient Care Overview  Goal: Plan of Care Review  Outcome: Ongoing (interventions implemented as appropriate)   11/09/19 0733   Coping/Psychosocial   Plan of Care Reviewed With patient   Plan of Care Review   Progress improving   OTHER   Outcome Summary Pt is A&O X4. C/o mild discomfort to LLE but denies need for PRN pain stuff, ice pack applied with good effect. LLE still no dorsiflexion but plantar flex is improved. Up X1 to bathroom, Family helps her up. VSS, Safty maintained.        Problem: Fall Risk (Adult)  Goal: Absence of Fall  Outcome: Ongoing (interventions implemented as appropriate)      Problem: Pain, Chronic (Adult)  Goal: Identify Related Risk Factors and Signs and Symptoms  Outcome: Outcome(s) achieved Date Met: 11/09/19    Goal: Acceptable Pain/Comfort Level and Functional Ability  Outcome: Ongoing (interventions implemented as appropriate)

## 2019-11-09 NOTE — PROGRESS NOTES
Continued Stay Note   [place]     Patient Name: Hailey Dickerson  MRN: [id number]  Today's Date: 11/9/2019    Admit Date: 11/6/2019    Discharge Plan     Row Name 11/09/19 1101       Plan    Final Discharge Disposition Code  06 - home with home health care    Final Note  Pt is being discharged home today with  orders. Pt has chosen [facility] from provider list. SW has spoke with Gustabo from [facility] who is aware of referral. SW will follow and assist with any other discharge needs that may arise.         Discharge Codes    No documentation.       Expected Discharge Date and Time     Expected Discharge Date Expected Discharge Time    Nov 10, 2019             Majo Cunningham

## 2019-11-09 NOTE — DISCHARGE PLACEMENT REQUEST
"Hailey Dickerson (79 y.o. Female)     Date of Birth Social Security Number Address Home Phone MRN    1940  [address] [facility] [place] [address] [phone] [id number]    Rastafarian Marital Status          Mosque        Admission Date Admission Type Admitting Provider Attending Provider Department, Room/Bed    11/6/19 Elective Rashaad Capps MD Jackson, Stephen H, MD [facility] 3A, 330/1    Discharge Date Discharge Disposition Discharge Destination         Home or Self Care              Attending Provider:  Rashaad Capps MD    Allergies:  Lortab [Hydrocodone-acetaminophen], Ciprocinonide [Fluocinolone], Sulfa Antibiotics    Isolation:  None   Infection:  None   Code Status:  CPR    Ht:  160 cm (62.99\")   Wt:  88.2 kg (194 lb 7.1 oz)    Admission Cmt:  None   Principal Problem:  None                Active Insurance as of 11/6/2019     Primary Coverage     Payor Plan Insurance Group Employer/Plan Group    ANTHEM MEDICARE REPLACEMENT AN[employer]M MEDICARE ADVANTAGE [id number]     Payor Plan Address Payor Plan Phone Number Payor Plan Fax Number Effective Dates    [address] [phone]  1/1/2019 - None Entered    [facility] [address]       Subscriber Name Subscriber Birth Date Member ID       HAILEY DICKERSON 1940 [id number]                 Emergency Contacts      (Rel.) Home Phone Work Phone Mobile Phone    Darin Dickerson (Spouse) [phone] -- --               History & Physical      H&P filed by [employer], [place] at 10/29/19 1333     Scan on 11/6/2019: HISTORY / ORTHOPAEDIC/MARLY SZYMANSKI/10/17/2019 (below)            Electronically signed by Interface, Scans Incoming at 10/29/19 1333          Physician Progress Notes (most recent note)      Rashaad Capps MD at 11/08/19 1042        Patient is 2 days postoperative for left total knee replacement.  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  Plan continue with therapy possible home in a.m.    Electronically signed by Rashaad Capps MD at 19 1042       Consult Notes (most recent note)     No notes of this type exist for this encounter.           Physical Therapy Notes (most recent note)      Gabi Stewart, PTA at 19 0855  Version 1 of 1         Acute Care - Physical Therapy Treatment Note  [facility]     Patient Name: Hailey Dickerson  : 1940  MRN: [id number]  Today's Date: 2019  Onset of Illness/Injury or Date of Surgery: 19     Referring Physician: MD Jefry    Admit Date: 2019    Visit Dx:    ICD-10-CM ICD-9-CM   1. Impaired mobility and ADLs Z74.09 799.89   2. Impaired mobility Z74.09 799.89     Patient Active Problem List   Diagnosis   • Essential hypertension   • Anxiety   • GERD (gastroesophageal reflux disease)   • Primary osteoarthritis of left knee   • Osteoarthritis of left knee       Therapy Treatment    Rehabilitation Treatment Summary     Row Name 19 0810             Treatment Time/Intention    Discipline  physical therapy assistant  -KJ      Document Type  therapy note (daily note)  -KJ2      Subjective Information  no complaints  -KJ2      Mode of Treatment  physical therapy  -KJ2      Patient Effort  good  -KJ2      Existing Precautions/Restrictions  fall;left  -KJ2      Treatment Considerations/Comments  TKR; dorsiflexion palsy L since sx  -KJ2      Recorded by [KJ] Gabi Stewart, PTA 19 0820  [KJ2] Gabi Stewart, PTA 19 0853      Row Name 19 0810             Bed Mobility Assessment/Treatment    Supine-Sit [place] (Bed Mobility)  independent  -KJ      Recorded by [KJ] Gabi Stewart, PTA 19 0853      Row Name 19 0810             Sit-Stand Transfer    Sit-Stand [place] (Transfers)  verbal cues;contact guard  -KJ      Assistive Device (Sit-Stand Transfers)  walker, front-wheeled  -KJ       Recorded by [KJ] Gabi Stewart, PTA 11/09/19 0853      Row Name 11/09/19 0810             Stand-Sit Transfer    Stand-Sit [place] (Transfers)  verbal cues;supervision  -KJ      Recorded by [KJ] Gabi Stewart, PTA 11/09/19 0853      Row Name 11/09/19 0810             Gait/Stairs Assessment/Training    Gait/Stairs Assessment/Training  gait/ambulation independence  -KJ      [place] Level (Gait)  verbal cues;supervision  -KJ      Assistive Device (Gait)  walker, front-wheeled  -KJ      Distance in Feet (Gait)  40' x 2  -KJ      Deviations/Abnormal Patterns (Gait)  gait speed decreased;stride length decreased  -KJ      Left Sided Gait Deviations  heel strike decreased;foot drop/toe drag  -KJ      Recorded by [KJ] Gabi Stewart, PTA 11/09/19 0853      Row Name 11/09/19 0810             Therapeutic Exercise    Exercise Type (Therapeutic Exercise)  AROM (active range of motion)  -KJ      Position (Therapeutic Exercise)  seated  -KJ      Sets/Reps (Therapeutic Exercise)  10  -KJ      Comment (Therapeutic Exercise)  education on mm tapping ant. tib for increased dorsiflexion stimulation  -KJ      Recorded by [KJ] Gabi Stewart, PTA 11/09/19 0853      Row Name 11/09/19 0810             Positioning and Restraints    Pre-Treatment Position  in bed  -KJ      Post Treatment Position  chair  -KJ      Recorded by [KJ] Gabi Stewart, PTA 11/09/19 0853      Row Name 11/09/19 0810             Pain Scale: Numbers Pre/Post-Treatment    Pain Scale: Numbers, Pretreatment  5/10  -KJ      Pain Scale: Numbers, Post-Treatment  5/10  -KJ      Pain Location - Side  Left  -KJ      Pain Location  knee  -KJ      Recorded by [KJ] Gabi Stewart, PTA 11/09/19 0853      Row Name                Wound 11/06/19 1139 Left knee Incision    Wound - Properties Group Date first assessed: 11/06/19 [JH] Time first assessed: 1139 [JH] Side: Left [JH] Location: knee [JH] Primary Wound Type: Incision [JH] Recorded by:  [JH] Marii Álvarez RN  11/06/19 1139      User Key  (r) = Recorded By, (t) = Taken By, (c) = Cosigned By    Initials Name Effective Dates Discipline    Gabi Lew, PTA 08/02/16 -  PT    Marii Fraser RN 06/07/17 -  Nurse          Wound 11/06/19 1139 Left knee Incision (Active)   Dressing Appearance dry;intact 11/8/2019  8:00 PM   Closure Sutures;ITZEL 11/8/2019  8:00 PM   Base dressing in place, unable to visualize 11/8/2019  8:00 PM   Drainage Amount none 11/8/2019  8:00 PM           Physical Therapy Education     Title: PT OT SLP Therapies (Done)     Topic: Physical Therapy (Done)     Point: Mobility training (Done)     Learning Progress Summary           Patient Acceptance, E, VU by NS at 11/9/2019  4:32 AM    Acceptance, E, VU,NR by SB at 11/7/2019 12:06 PM    Comment:  pt edu on knee HEP, use of RW, safety precautions, POC, benefits of act, d/c plans                   Point: Home exercise program (Done)     Learning Progress Summary           Patient Acceptance, E, VU by NS at 11/9/2019  4:32 AM    Acceptance, E,TB,D,H, VU,DU by CAREY at 11/8/2019 11:41 AM    Comment:  LE HEP    Acceptance, E, VU,NR by SB at 11/7/2019 12:06 PM    Comment:  pt edu on knee HEP, use of RW, safety precautions, POC, benefits of act, d/c plans                   Point: Body mechanics (Done)     Learning Progress Summary           Patient Acceptance, E, VU by NS at 11/9/2019  4:32 AM    Acceptance, E, VU,NR by SB at 11/7/2019 12:06 PM    Comment:  pt edu on knee HEP, use of RW, safety precautions, POC, benefits of act, d/c plans                   Point: Precautions (Done)     Learning Progress Summary           Patient Acceptance, E, VU by NS at 11/9/2019  4:32 AM    Acceptance, E, VU,NR by SB at 11/7/2019 12:06 PM    Comment:  pt edu on knee HEP, use of RW, safety precautions, POC, benefits of act, d/c plans                               User Key     Initials Effective Dates Name Provider Type Discipline    SB 10/31/19 -  Evelyn Moran, PT [place]  Physical Therapist PT    NS 07/01/19 -  Elisabeth Quigley RNA Registered Nurse Nurse     11/06/19 -  Conchita Carter, PTA Student PTA Student PT                PT Recommendation and Plan     Plan of Care Reviewed With: patient  Progress: improving  Outcome Summary: PT tx completed. Pt supine in bed with no c/o. 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 I bed mobility, S/CG sit<>stand, amb 40' x 2 with r wx S. Cues for gait and A.D. placement. Recommend outpatient services at time of discharge due to palsy left foot.  Outcome Measures     Row Name 11/08/19 1000 11/07/19 0900          How much help from another is currently needed...    Putting on and taking off regular lower body clothing?  3  -TS  2  -MW (r) AB (t) MW (c)     Bathing (including washing, rinsing, and drying)  3  -TS  2  -MW (r) AB (t) MW (c)     Toileting (which includes using toilet bed pan or urinal)  3  -TS  3  -MW (r) AB (t) MW (c)     Putting on and taking off regular upper body clothing  4  -TS  4  -MW (r) AB (t) MW (c)     Taking care of personal grooming (such as brushing teeth)  4  -TS  4  -MW (r) AB (t) MW (c)     Eating meals  4  -TS  4  -MW (r) AB (t) MW (c)     AM-PAC 6 Clicks Score (OT)  21  -TS  19  -MW (r) AB (t)        Functional Assessment    Outcome Measure Options  --  AM-PAC 6 Clicks Daily Activity (OT)  -MW (r) AB (t) MW (c)       User Key  (r) = Recorded By, (t) = Taken By, (c) = Cosigned By    Initials Name Provider Type    TS Salina Garcia, CARDONA/L Occupational Therapy Assistant    Flora Mehta, OTR/L Occupational Therapist    Rosa Shipley, OT Student OT Student         Time Calculation:   PT Charges     Row Name 11/09/19 0840             Time Calculation    Start Time  0810  -KJ      Stop Time  0833  -KJ      Time Calculation (min)  23 min  -KJ      PT Received On  11/09/19  -KJ      PT Goal Re-Cert Due Date  11/17/19  -KJ         Time Calculation- PT    Total Timed Code Minutes- PT  23 minute(s)  -KJ         User Key  (r) = Recorded By, (t) = Taken By, (c) = Cosigned By    Initials Name Provider Type    KJ Gabi Stewart PTA Physical Therapy Assistant        Therapy Charges for Today     Code Description Service Date Service Provider Modifiers Qty    44018603485 HC PT THER PROC EA 15 MIN 2019 Gabi Stewart PTA GP 1    72886305547 HC GAIT TRAINING EA 15 MIN 2019 Gabi Stewart PTA GP 1          PT G-Codes  Outcome Measure Options: AM-PAC 6 Clicks Daily Activity (OT)  AM-PAC 6 Clicks Score (PT): 18  AM-PAC 6 Clicks Score (OT): 21    Gabi Stewart PTA  2019         Electronically signed by Gabi Stewart PTA at 19 0855          Occupational Therapy Notes (most recent note)      Salina Garcia COTA/L at 19 1101          Acute Care - Occupational Therapy Treatment Note  [facility]     Patient Name: Hailey Dickerson  : 1940  MRN: [id number]  Today's Date: 2019  Onset of Illness/Injury or Date of Surgery: 19  Date of Referral to OT: 19  Referring Physician: MD Jefry    Admit Date: 2019       ICD-10-CM ICD-9-CM   1. Impaired mobility and ADLs Z74.09 799.89   2. Impaired mobility Z74.09 799.89     Patient Active Problem List   Diagnosis   • Essential hypertension   • Anxiety   • GERD (gastroesophageal reflux disease)   • Primary osteoarthritis of left knee   • Osteoarthritis of left knee     Past Medical History:   Diagnosis Date   • Anxiety    • Arthritis    • GERD (gastroesophageal reflux disease)    • Hypertension      Past Surgical History:   Procedure Laterality Date   • ANKLE SURGERY     • CHOLECYSTECTOMY     • DILATATION AND CURETTAGE     • TOTAL KNEE ARTHROPLASTY Left 2019    Procedure: TOTAL KNEE REPLACEMENT;  Surgeon: Rashaad Capps MD;  Location: [facility];  Service: Orthopedics       Therapy Treatment    Rehabilitation Treatment Summary     Row Name 19 1005 19 0755          Treatment Time/Intention    Discipline   occupational therapy assistant  -TS  --  -TS     Document Type  therapy note (daily note)  -TS  --  -TS     Subjective Information  complains of;fatigue;nausea/vomiting nausea post ambulation   -TS2  --     Patient Effort  good  -TS2  --     Existing Precautions/Restrictions  fall  -TS2  --     Recorded by [TS] Salina Garcia CARDONA/L 11/08/19 1005  [TS2] Salina Garcia CADRONA/L 11/08/19 1037 [TS] Salina Garcia CARDONA/L 11/08/19 0801     Row Name 11/08/19 1005             Cognitive Assessment/Intervention- PT/OT    Personal Safety Interventions  fall prevention program maintained;gait belt;nonskid shoes/slippers when out of bed  -TS      Recorded by [TS] Salina Garcia CARDONA/L 11/08/19 1037      Row Name 11/08/19 1005             Functional Mobility    Functional Mobility- Ind. Level  standby assist;supervision required  -TS      Functional Mobility- Device  rolling walker  -TS      Functional Mobility- Comment  in room, in BR  -TS      Recorded by [TS] Salina Garcia CARDONA/L 11/08/19 1059      Row Name 11/08/19 1005             Transfer Assessment/Treatment    Transfer Assessment/Treatment  sit-stand transfer;stand-sit transfer;toilet transfer  -TS      Comment (Transfers)  discussed BSC over standard commode for increased stability during t/fs along with elevating seats at home for increased ease of t/f  -TS      Recorded by [TS] Salina Garcia CARDONA/L 11/08/19 1059      Row Name 11/08/19 1005             Sit-Stand Transfer    Sit-Stand [place] (Transfers)  stand by assist  -TS      Assistive Device (Sit-Stand Transfers)  walker, front-wheeled  -TS      Recorded by [TS] Salina Garcia CARDONA/L 11/08/19 1059      Row Name 11/08/19 1005             Stand-Sit Transfer    Stand-Sit [place] (Transfers)  stand by assist  -TS      Assistive Device (Stand-Sit Transfers)  walker, front-wheeled  -TS      Recorded by [TS] Salina Garcia CARDONA/L 11/08/19 1059      Row  Name 11/08/19 1005             Toilet Transfer    Type (Toilet Transfer)  sit-stand;stand-sit  -TS      [place] Level (Toilet Transfer)  supervision  -TS      Assistive Device (Toilet Transfer)  commode;grab bars/safety frame;walker, 4-wheeled  -TS      Recorded by [TS] Salina Garcia CARDONA/L 11/08/19 1059      Row Name 11/08/19 1005             ADL Assessment/Intervention    BADL Assessment/Intervention  lower body dressing;toileting  -TS      Recorded by [TS] Salina Garcia CARDONA/L 11/08/19 1059      Row Name 11/08/19 1005             Lower Body Dressing Assessment/Training    Lower Body Dressing [place] Level  don;socks;set up;contact guard assist  -TS      Lower Body Dressing Position  edge of bed sitting  -TS      Recorded by [TS] Salina Garcia CARDONA/L 11/08/19 1059      Row Name 11/08/19 1005             Toileting Assessment/Training    [place] Level (Toileting)  toileting skills;perform perineal hygiene;adjust/manage clothing;supervision  -TS      Assistive Devices (Toileting)  commode;grab bar/safety frame  -TS      Toileting Position  unsupported sitting;supported standing  -TS      Recorded by [TS] Salina Garcia CARDONA/L 11/08/19 1059      Row Name 11/08/19 1005             Positioning and Restraints    Pre-Treatment Position  in bed  -TS      Post Treatment Position  chair  -TS      In Chair  reclined;call light within reach;encouraged to call for assist;notified nsg  -TS      Recorded by [TS] Salina Garcia CARDONA/L 11/08/19 1059      Row Name 11/08/19 1005             Pain Scale: Numbers Pre/Post-Treatment    Pain Scale: Numbers, Pretreatment  2/10  -TS      Pain Scale: Numbers, Post-Treatment  3/10  -TS      Pain Location - Side  Left  -TS      Pain Location  knee  -TS      Pain Intervention(s)  Repositioned  -TS      Recorded by [TS] Salina Garcia CARDONA/L 11/08/19 1059      Row Name                Wound 11/06/19 1139 Left knee Incision    Wound -  Properties Group Date first assessed: 11/06/19 [JH] Time first assessed: 1139 [JH] Side: Left [JH] Location: knee [JH] Primary Wound Type: Incision [JH] Recorded by:  [] Marii Álvarez RN 11/06/19 1139    Row Name 11/08/19 1005             Outcome Summary/Treatment Plan (OT)    Daily Summary of Progress (OT)  progress toward functional goals is good  -TS      Recorded by [TS] Salina Garcia COTA/JOSÉ 11/08/19 1059        User Key  (r) = Recorded By, (t) = Taken By, (c) = Cosigned By    Initials Name Effective Dates Discipline    TS Salina Garcia COTA/L 08/02/16 -  OT     Marii Álvarez RN 06/07/17 -  Nurse        Wound 11/06/19 1139 Left knee Incision (Active)   Dressing Appearance dry;intact;no drainage 11/8/2019  8:07 AM   Closure Sutures;ITZEL 11/7/2019  8:02 PM   Base dressing in place, unable to visualize 11/8/2019  8:07 AM   Drainage Amount none 11/8/2019  8:07 AM   Dressing Care, Wound gauze 11/8/2019  8:07 AM       Occupational Therapy Education     Title: PT OT SLP Therapies (Done)     Topic: Occupational Therapy (Done)     Point: ADL training (Done)     Description: Instruct learner(s) on proper safety adaptation and remediation techniques during self care or transfers.   Instruct in proper use of assistive devices.    Learning Progress Summary           Patient Acceptance, E, VU by AB at 11/7/2019  9:27 AM    Comment:  OT POC, safety awareness, risks/benefits, ADL, txfrs, D/C plans                   Point: Precautions (Done)     Description: Instruct learner(s) on prescribed precautions during self-care and functional transfers.    Learning Progress Summary           Patient Acceptance, E, VU by AB at 11/7/2019  9:27 AM    Comment:  OT POC, safety awareness, risks/benefits, ADL, txfrs, D/C plans                   Point: Body mechanics (Done)     Description: Instruct learner(s) on proper positioning and spine alignment during self-care, functional mobility activities and/or exercises.     Learning Progress Summary           Patient Acceptance, E, VU by AB at 11/7/2019  9:27 AM    Comment:  OT POC, safety awareness, risks/benefits, ADL, txfrs, D/C plans                               User Key     Initials Effective Dates Name Provider Type [facility] 07/25/19 -  Rosa Palmer, OT Student OT Student OT                OT Recommendation and Plan  Outcome Summary/Treatment Plan (OT)  Daily Summary of Progress (OT): progress toward functional goals is good  Daily Summary of Progress (OT): progress toward functional goals is good  Outcome Measures     Row Name 11/08/19 1000 11/07/19 0900          How much help from another is currently needed...    Putting on and taking off regular lower body clothing?  3  -TS  2  -MW (r) AB (t) MW (c)     Bathing (including washing, rinsing, and drying)  3  -TS  2  -MW (r) AB (t) MW (c)     Toileting (which includes using toilet bed pan or urinal)  3  -TS  3  -MW (r) AB (t) MW (c)     Putting on and taking off regular upper body clothing  4  -TS  4  -MW (r) AB (t) MW (c)     Taking care of personal grooming (such as brushing teeth)  4  -TS  4  -MW (r) AB (t) MW (c)     Eating meals  4  -TS  4  -MW (r) AB (t) MW (c)     AM-PAC 6 Clicks Score (OT)  21  -TS  19  -MW (r) AB (t)        Functional Assessment    Outcome Measure Options  --  AM-PAC 6 Clicks Daily Activity (OT)  -MW (r) AB (t) MW (c)       User Key  (r) = Recorded By, (t) = Taken By, (c) = Cosigned By    Initials Name Provider Type    TS Salina Garcia, CARDONA/L Occupational Therapy Assistant    Flora Mehta, OTR/L Occupational Therapist    AB Rosa Palmer, OT Student OT Student           Time Calculation:   Time Calculation- OT     Row Name 11/08/19 1100             Time Calculation- OT    OT Start Time  1005  -TS      OT Stop Time  1050  -TS      OT Time Calculation (min)  45 min  -TS      Total Timed Code Minutes- OT  45 minute(s)  -TS      OT Received On  11/08/19  -TS         Timed Charges     60795 - OT Self Care/Mgmt Minutes  45  -TS        User Key  (r) = Recorded By, (t) = Taken By, (c) = Cosigned By    Initials Name Provider Type    TS Salina Garcia COTA/L Occupational Therapy Assistant        Therapy Charges for Today     Code Description Service Date Service Provider Modifiers Qty    71325575440 HC OT SELF CARE/MGMT/TRAIN EA 15 MIN 11/8/2019 Salina Garcia COTA/L GO 3               Salina LAURENT. JW Garcia  11/8/2019    Electronically signed by Salina Garcia COTA/L at 11/08/19 1102          Discharge Summary      Rashaad Capps MD at 11/09/19 1040          [facility]  DISCHARGE SUMMARY       Date of Admission: 11/6/2019  Date of Discharge:  11/10/19  Primary Care Physician: Titi Cotton MD    Presenting Problem/History of Present Illness:  Primary osteoarthritis of left knee [M17.12]  Osteoarthritis of left knee [M17.12]     Final Discharge Diagnoses:  Active Hospital Problems    Diagnosis   • Primary osteoarthritis of left knee   • Osteoarthritis of left knee       Consults: None    Procedures Performed: Left total knee replacement    Pertinent Test Results: YUDY globin stable    History of Present Illness on Day of Discharge: On discharge    Hospital Course:  The patient is a 79 y.o. female who presented to [facility] with need for primary osteoarthritis of the left knee.  On day of admission left total knee replaced carried out.  She is done very well with her knee.  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        /53 (BP Location: Left arm, Patient Position: Lying)   Pulse 104   Temp 98.5 °F (36.9 °C) (Oral)   Resp 17   LMP  (LMP Unknown)   SpO2 95%   Breastfeeding? No       Discharge Medications:     Discharge Medications      ASK your doctor about these medications       Instructions Start Date   alendronate 70 MG tablet  Commonly known as:  FOSAMAX   70 mg, Oral, Every 7 Days      butalbital-acetaminophen-caffeine -40 MG per tablet  Commonly known as:  FIORICET, ESGIC   One every 6 hours when necessary headache      citalopram 20 MG tablet  Commonly known as:  CeleXA   20 mg, Oral, Daily      Cranberry 125 MG tablet   1 tablet, Oral, Daily      indapamide 2.5 MG tablet  Commonly known as:  LOZOL   2.5 mg, Oral, Every Morning      lisinopril 10 MG tablet  Commonly known as:  PRINIVIL,ZESTRIL   10 mg, Oral, Daily      multivitamin with minerals tablet tablet   1 tablet, Oral, Daily      raNITIdine 150 MG tablet  Commonly known as:  ZANTAC   150 mg, Oral, Nightly      simvastatin 20 MG tablet  Commonly known as:  ZOCOR   20 mg, Oral, Nightly             Discharge Diet: Regular  Discharge Care Plan/Instructions: #1 she can bear weight as tolerated #2 she can shower and redress wound #3 she will have home health physical therapy #4 she will be maintained on aspirin No. 5 she is to follow-up in the office    Follow-up Appointments: Monday,   Future Appointments   Date Time Provider Department Center   3/31/2020  9:00 AM Titi Cotton MD [facility] None         Rashaad Capps MD  19  10:40 AM        Electronically signed by Rashaad Capps MD at 19 [facility]         [address] [address]  Phone:  [phone]  Fax:   Date: 2019      Referral to Home Health     Patient:  Hailey Dickerson MRN:  [id number]   [facility] [address] :  1940  SSN:    Phone: [phone] Sex:  F      INSURANCE PAYOR PLAN GROUP # SUBSCRIBER ID   Primary:    [employer] MED[employer] REPLACEMENT [id number]      Referring Provider Information:  RASHAAD CAPPS Phone: [phone] Fax:       Referral Information:   # Visits:  1 Referral Type: Home Health [42]   Urgency:   Routine Referral Reason: Specialty Services Required   Start Date: Nov 9, 2019 End Date:  To be determined by Insurer   Diagnosis: Primary osteoarthritis of left knee (M17.12 [ICD-10-CM] 715.16 [ICD-9-CM])      Refer to Dept:   Refer to Provider:   Refer to Facility:       Face to Face Visit Date: 11/9/2019  Follow-up provider for Plan of Care? I treated the patient in an acute care facility and will not continue treatment after discharge.  Follow-up provider: RASHAAD CAPPS [[id number]]  Reason/Clinical Findings: Total knee  Describe mobility limitations that make leaving home difficult: Total knee  Nursing/Therapeutic Services Requested: Physical Therapy  Frequency: 1 Week 1     This document serves as a request of services and does not constitute Insurance authorization or approval of services.  To determine eligibility, please contact the members Insurance carrier to verify and review coverage.     If you have medical questions regarding this request for services. Please contact [address] at [phone] during normal business hours.       Authorizing Provider:Rashaad Capps MD  Authorizing Provider's NPI: [id number]  Order Entered By: Rashaad Capps MD 11/9/2019 10:45 AM     Electronically signed by: Rashaad Capps MD 11/9/2019 10:45 AM

## 2019-11-09 NOTE — PLAN OF CARE
Problem: Patient Care Overview  Goal: Plan of Care Review  Outcome: Outcome(s) achieved Date Met: 11/09/19 11/09/19 4562   Coping/Psychosocial   Plan of Care Reviewed With patient;family   Plan of Care Review   Progress improving   OTHER   Outcome Summary Patient to be discharged home tomorrow with [facility]. Safety maintained, up with standby assist and walker. Dressing CDI, PPP. No dorsiflex left foot, Dr. Capps aware, continue to monitor. Medicated for pain with relief noted.     Goal: Individualization and Mutuality  Outcome: Outcome(s) achieved Date Met: 11/09/19    Goal: Discharge Needs Assessment  Outcome: Outcome(s) achieved Date Met: 11/09/19    Goal: Interprofessional Rounds/Family Conf  Outcome: Outcome(s) achieved Date Met: 11/09/19      Problem: Fall Risk (Adult)  Goal: Absence of Fall  Outcome: Outcome(s) achieved Date Met: 11/09/19      Problem: Pain, Chronic (Adult)  Goal: Acceptable Pain/Comfort Level and Functional Ability  Outcome: Outcome(s) achieved Date Met: 11/09/19      Problem: Knee Arthroplasty (Total, Partial) (Adult)  Goal: Signs and Symptoms of Listed Potential Problems Will be Absent, Minimized or Managed (Knee Arthroplasty)  Outcome: Outcome(s) achieved Date Met: 11/09/19    Goal: Anesthesia/Sedation Recovery  Outcome: Outcome(s) achieved Date Met: 11/09/19

## 2019-11-09 NOTE — PLAN OF CARE
Problem: Patient Care Overview  Goal: Plan of Care Review  Outcome: Ongoing (interventions implemented as appropriate)   11/09/19 0420   Coping/Psychosocial   Plan of Care Reviewed With patient   Plan of Care Review   Progress improving   OTHER   Outcome Summary PT tx completed. Pt supine in bed with no c/o. 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 I bed mobility, S/CG sit<>stand, amb 40' x 2 with r wx S. Cues for gait and A.D. placement. Recommend outpatient services at time of discharge due to palsy left foot.

## 2019-11-10 VITALS
HEART RATE: 101 BPM | TEMPERATURE: 97.6 F | OXYGEN SATURATION: 98 % | DIASTOLIC BLOOD PRESSURE: 67 MMHG | SYSTOLIC BLOOD PRESSURE: 117 MMHG | RESPIRATION RATE: 16 BRPM

## 2019-11-10 PROCEDURE — 97116 GAIT TRAINING THERAPY: CPT

## 2019-11-10 PROCEDURE — 97110 THERAPEUTIC EXERCISES: CPT

## 2019-11-10 RX ADMIN — HYDROCODONE BITARTRATE AND ACETAMINOPHEN 1 TABLET: 7.5; 325 TABLET ORAL at 05:26

## 2019-11-10 RX ADMIN — ONDANSETRON 4 MG: 4 TABLET, FILM COATED ORAL at 05:26

## 2019-11-10 RX ADMIN — LISINOPRIL 10 MG: 10 TABLET ORAL at 10:15

## 2019-11-10 RX ADMIN — HYDROCODONE BITARTRATE AND ACETAMINOPHEN 1 TABLET: 7.5; 325 TABLET ORAL at 10:14

## 2019-11-10 RX ADMIN — FAMOTIDINE 20 MG: 20 TABLET, FILM COATED ORAL at 10:14

## 2019-11-10 RX ADMIN — ASPIRIN 325 MG: 325 TABLET, COATED ORAL at 10:15

## 2019-11-10 NOTE — PLAN OF CARE
Problem: Patient Care Overview  Goal: Plan of Care Review  Outcome: Ongoing (interventions implemented as appropriate)   11/10/19 0555   Coping/Psychosocial   Plan of Care Reviewed With patient   Plan of Care Review   Progress improving   OTHER   Outcome Summary Pt c/o moderate pain, PRN PO pain medicaition given as prescribed, with relief noted. Pt up x1, with walker,safety maintained, VSS, PPP, will continue to monitor.       Problem: Fall Risk (Adult)  Goal: Identify Related Risk Factors and Signs and Symptoms  Outcome: Outcome(s) achieved Date Met: 11/10/19    Goal: Absence of Fall  Outcome: Ongoing (interventions implemented as appropriate)      Problem: Pain, Chronic (Adult)  Goal: Identify Related Risk Factors and Signs and Symptoms  Outcome: Outcome(s) achieved Date Met: 11/10/19    Goal: Acceptable Pain/Comfort Level and Functional Ability  Outcome: Ongoing (interventions implemented as appropriate)      Problem: Knee Arthroplasty (Total, Partial) (Adult)  Goal: Signs and Symptoms of Listed Potential Problems Will be Absent, Minimized or Managed (Knee Arthroplasty)  Outcome: Ongoing (interventions implemented as appropriate)

## 2019-11-10 NOTE — PLAN OF CARE
Problem: Patient Care Overview  Goal: Plan of Care Review  Outcome: Outcome(s) achieved Date Met: 11/10/19   11/10/19 1030   Coping/Psychosocial   Plan of Care Reviewed With patient;spouse;daughter   Plan of Care Review   Progress improving   OTHER   Outcome Summary Patient to be discharged home today with home health. Medicated once for pain with relief noted. Safety maintained. Dressing changed. Knee education done. PPP, dorsiflexion absent left foot, physician aware.     Goal: Individualization and Mutuality  Outcome: Outcome(s) achieved Date Met: 11/10/19    Goal: Discharge Needs Assessment  Outcome: Outcome(s) achieved Date Met: 11/10/19    Goal: Interprofessional Rounds/Family Conf  Outcome: Outcome(s) achieved Date Met: 11/10/19      Problem: Fall Risk (Adult)  Goal: Absence of Fall  Outcome: Outcome(s) achieved Date Met: 11/10/19      Problem: Pain, Chronic (Adult)  Goal: Acceptable Pain/Comfort Level and Functional Ability  Outcome: Outcome(s) achieved Date Met: 11/10/19      Problem: Knee Arthroplasty (Total, Partial) (Adult)  Goal: Signs and Symptoms of Listed Potential Problems Will be Absent, Minimized or Managed (Knee Arthroplasty)  Outcome: Outcome(s) achieved Date Met: 11/10/19    Goal: Anesthesia/Sedation Recovery  Outcome: Outcome(s) achieved Date Met: 11/10/19

## 2019-11-10 NOTE — THERAPY DISCHARGE NOTE
Acute Care - Physical Therapy Discharge Summary  [facility]       Patient Name: Hailey Dickerson  : 1940  MRN: [id number]    Today's Date: 11/10/2019  Onset of Illness/Injury or Date of Surgery: 19       Referring Physician: MD Jefry      Admit Date: 2019      PT Recommendation and Plan    Visit Dx:    ICD-10-CM ICD-9-CM   1. Primary osteoarthritis of left knee M17.12 715.16   2. Impaired mobility and ADLs Z74.09 799.89   3. Impaired mobility Z74.09 799.89       Outcome Measures     Row Name 19 1000             How much help from another is currently needed...    Putting on and taking off regular lower body clothing?  3  -TS      Bathing (including washing, rinsing, and drying)  3  -TS      Toileting (which includes using toilet bed pan or urinal)  3  -TS      Putting on and taking off regular upper body clothing  4  -TS      Taking care of personal grooming (such as brushing teeth)  4  -TS      Eating meals  4  -TS      AM-PAC 6 Clicks Score (OT)  21  -TS        User Key  (r) = Recorded By, (t) = Taken By, (c) = Cosigned By    Initials Name Provider Type    TS Salina Garcia, DULCE/L Occupational Therapy Assistant          PT Charges     Row Name 11/10/19 0925             Time Calculation    Start Time  0900  -KJ      Stop Time  0925  -KJ      Time Calculation (min)  25 min  -KJ      PT Received On  11/10/19  -KJ      PT Goal Re-Cert Due Date  19  -KJ         Time Calculation- PT    Total Timed Code Minutes- PT  25 minute(s)  -KJ        User Key  (r) = Recorded By, (t) = Taken By, (c) = Cosigned By    Initials Name Provider Type    Gabi Lew, PTA Physical Therapy Assistant          Rehab Goal Summary     Row Name 11/10/19 1433             Bed Mobility Goal 1 (PT)    Activity/Assistive Device (Bed Mobility Goal 1, PT)  sit to supine;supine to sit  -AH      [place] Level/Cues Needed (Bed Mobility Goal 1, PT)  conditional independence  -AH      Time Frame (Bed  Mobility Goal 1, PT)  10 days  -AH      Progress/Outcomes (Bed Mobility Goal 1, PT)  goal met  -AH         Transfer Goal 1 (PT)    Activity/Assistive Device (Transfer Goal 1, PT)  sit-to-stand/stand-to-sit;bed-to-chair/chair-to-bed;walker, rolling  -AH      [place] Level/Cues Needed (Transfer Goal 1, PT)  supervision required  -AH      Time Frame (Transfer Goal 1, PT)  10 days  -AH      Progress/Outcome (Transfer Goal 1, PT)  goal met  -AH         Gait Training Goal 1 (PT)    Activity/Assistive Device (Gait Training Goal 1, PT)  gait (walking locomotion);improve balance and speed;increase endurance/gait distance;assistive device use;decrease fall risk;increase energy conservation;walker, rolling  -AH      [place] Level (Gait Training Goal 1, PT)  supervision required  -AH      Distance (Gait Goal 1, PT)  150  -AH      Time Frame (Gait Training Goal 1, PT)  10 days  -AH      Progress/Outcome (Gait Training Goal 1, PT)  goal not met  -AH         ROM Goal 1 (PT)    ROM Goal 1 (PT)  L knee AROM 15-90 degs  -AH      Time Frame (ROM Goal 1, PT)  long term goal (LTG)  -AH      Progress/Outcome (ROM Goal 1, PT)  goal not met  -AH         Patient Education Goal (PT)    Activity (Patient Education Goal, PT)  Pt will be independent with knee HEP  -AH      [place]/Cues/Accuracy (Memory Goal 2, PT)  independent  -AH      Time Frame (Patient Education Goal, PT)  10 days  -AH      Progress/Outcome (Patient Education Goal, PT)  goal met  -AH        User Key  (r) = Recorded By, (t) = Taken By, (c) = Cosigned By    Initials Name Provider Type Discipline    Catarina Merino PTA Physical Therapy Assistant PT              PT Discharge Summary  Reason for Discharge: Discharge from facility  Outcomes Achieved: Refer to plan of care for updates on goals achieved  Discharge Destination: Home with home health      Catarina Teixeira PTA   11/10/2019

## 2019-11-10 NOTE — THERAPY TREATMENT NOTE
Acute Care - Physical Therapy Treatment Note  [facility]     Patient Name: Hailey Dickerson  : 1940  MRN: [id number]  Today's Date: 11/10/2019  Onset of Illness/Injury or Date of Surgery: 19     Referring Physician: MD Jefry    Admit Date: 2019    Visit Dx:    ICD-10-CM ICD-9-CM   1. Primary osteoarthritis of left knee M17.12 715.16   2. Impaired mobility and ADLs Z74.09 799.89   3. Impaired mobility Z74.09 799.89     Patient Active Problem List   Diagnosis   • Essential hypertension   • Anxiety   • GERD (gastroesophageal reflux disease)   • Primary osteoarthritis of left knee   • Osteoarthritis of left knee       Therapy Treatment    Rehabilitation Treatment Summary     Row Name 11/10/19 0900             Treatment Time/Intention    Discipline  physical therapy assistant  -KJ      Document Type  therapy note (daily note)  -KJ      Subjective Information  no complaints  -KJ      Mode of Treatment  physical therapy  -KJ      Patient/Family Observations  spouse present  -KJ      Comment  L TKR  -KJ      Treatment Considerations/Comments  dorsiflexor palsy since sx  -KJ      Recorded by [KJ] Gabi Stewart, PTA 11/10/19 0925      Row Name 11/10/19 0900             Bed Mobility Assessment/Treatment    Supine-Sit [place] (Bed Mobility)  independent  -KJ      Recorded by [KJ] Gabi Stewart, PTA 11/10/19 0925      Row Name 11/10/19 0900             Sit-Stand Transfer    Sit-Stand [place] (Transfers)  verbal cues;supervision  -KJ      Assistive Device (Sit-Stand Transfers)  walker, front-wheeled  -KJ      Recorded by [KJ] Gabi Stewart, PTA 11/10/19 0925      Row Name 11/10/19 0900             Stand-Sit Transfer    Stand-Sit [place] (Transfers)  independent;verbal cues  -KJ      Assistive Device (Stand-Sit Transfers)  walker, front-wheeled  -KJ      Recorded by [KJ] Gabi Stewart, PTA 11/10/19 0925      Row Name 11/10/19 0900             Gait/Stairs Assessment/Training    Gait/Stairs  Assessment/Training  gait/ambulation independence  -KJ      [place] Level (Gait)  verbal cues;conditional independence  -KJ      Assistive Device (Gait)  walker, front-wheeled  -KJ      Distance in Feet (Gait)  100  -KJ      Pattern (Gait)  step-to  -KJ      Deviations/Abnormal Patterns (Gait)  nirm[place] decreased  -KJ      Left Sided Gait Deviations  foot drop/toe drag;heel strike decreased  -KJ      Comment (Gait/Stairs)  reviewed home safety and HEP  -KJ      Recorded by [KJ] Gabi Stewart, [facility] 11/10/19 0925      Row Name 11/10/19 0900             Therapeutic Exercise    Exercise Type (Therapeutic Exercise)  AROM (active range of motion)  -KJ      Position (Therapeutic Exercise)  seated  -KJ      Sets/Reps (Therapeutic Exercise)  15  -KJ      Recorded by [KJ] Gabi Stewart, [facility] 11/10/19 0925      Row Name 11/10/19 0900             Positioning and Restraints    Pre-Treatment Position  in bed  -KJ      Post Treatment Position  chair  -KJ      Recorded by [KJ] Gabi Stewart, [facility] 11/10/19 0925      Row Name 11/10/19 0900             Pain Scale: Numbers Pre/Post-Treatment    Pain Scale: Numbers, Pretreatment  4/10  -KJ      Pain Scale: Numbers, Post-Treatment  7/10  -KJ      Pain Location - Side  Left  -KJ      Pain Location  knee  -KJ      Recorded by [KJ] Gabi Stewart, [facility] 11/10/19 0925      Row Name                Wound 11/06/19 1139 Left knee Incision    Wound - Properties Group Date first assessed: 11/06/19 [JH] Time first assessed: 1139 [JH] Side: Left [JH] Location: knee [JH] Primary Wound Type: Incision [JH] Recorded by:  [JH] Marii Álvarez RN 11/06/19 1139      User Key  (r) = Recorded By, (t) = Taken By, (c) = Cosigned By    Initials Name Effective Dates Discipline    KJ Gabi Stewart, PTA 08/02/16 -  PT    Marii Fraser RN 06/07/17 -  Nurse          Wound 11/06/19 1139 Left knee Incision (Active)   Dressing Appearance dry;intact 11/9/2019  8:34 PM   Closure ITZEL 11/9/2019  8:34 PM    Base dressing in place, unable to visualize 11/9/2019  8:34 PM   Drainage Amount none 11/9/2019  8:34 PM   Dressing Care, Wound dressing changed 11/9/2019  8:34 PM           Physical Therapy Education     Title: PT OT SLP Therapies (Done)     Topic: Physical Therapy (Done)     Point: Mobility training (Done)     Learning Progress Summary           Patient Acceptance, E, VU by NS at 11/9/2019  4:32 AM    Acceptance, E, VU,NR by SB at 11/7/2019 12:06 PM    Comment:  pt edu on knee HEP, use of RW, safety precautions, POC, benefits of act, d/c plans                   Point: Home exercise program (Done)     Learning Progress Summary           Patient Acceptance, E, VU by NS at 11/9/2019  4:32 AM    Acceptance, E,TB,D,H, VU,DU by MW at 11/8/2019 11:41 AM    Comment:  LE HEP    Acceptance, E, VU,NR by SB at 11/7/2019 12:06 PM    Comment:  pt edu on knee HEP, use of RW, safety precautions, POC, benefits of act, d/c plans                   Point: Body mechanics (Done)     Learning Progress Summary           Patient Acceptance, E, VU by NS at 11/9/2019  4:32 AM    Acceptance, E, VU,NR by SB at 11/7/2019 12:06 PM    Comment:  pt edu on knee HEP, use of RW, safety precautions, POC, benefits of act, d/c plans                   Point: Precautions (Done)     Learning Progress Summary           Patient Acceptance, E, VU by NS at 11/9/2019  4:32 AM    Acceptance, E, VU,NR by SB at 11/7/2019 12:06 PM    Comment:  pt edu on knee HEP, use of RW, safety precautions, POC, benefits of act, d/c plans                               User Key     Initials Effective Dates Name Provider Type Discipline    SB 10/31/19 -  Evelyn Moran, PT DPT Physical Therapist PT    NS 07/01/19 -  Elisabeth Quigley RNA Registered Nurse Nurse     11/06/19 -  Conchita Carter, PTA Student PTA Student PT                PT Recommendation and Plan     Plan of Care Reviewed With: patient  Progress: improving  Outcome Summary: PT tx completed. Pt supine in bed with no  c/o. 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 I bed mobility, S/CG sit<>stand, amb 40' x 2 with r wx S. Cues for gait and A.D. placement. Recommend outpatient services at time of discharge due to palsy left foot.  Outcome Measures     Row Name 11/08/19 1000             How much help from another is currently needed...    Putting on and taking off regular lower body clothing?  3  -TS      Bathing (including washing, rinsing, and drying)  3  -TS      Toileting (which includes using toilet bed pan or urinal)  3  -TS      Putting on and taking off regular upper body clothing  4  -TS      Taking care of personal grooming (such as brushing teeth)  4  -TS      Eating meals  4  -TS      AM-PAC 6 Clicks Score (OT)  21  -TS        User Key  (r) = Recorded By, (t) = Taken By, (c) = Cosigned By    Initials Name Provider Type    Salina Ludwig COTA/L Occupational Therapy Assistant         Time Calculation:   PT Charges     Row Name 11/10/19 0925             Time Calculation    Start Time  0900  -KJ      Stop Time  0925  -KJ      Time Calculation (min)  25 min  -KJ      PT Received On  11/10/19  -KJ      PT Goal Re-Cert Due Date  11/17/19  -KJ         Time Calculation- PT    Total Timed Code Minutes- PT  25 minute(s)  -KJ        User Key  (r) = Recorded By, (t) = Taken By, (c) = Cosigned By    Initials Name Provider Type    Gabi Lew, CLYDE Physical Therapy Assistant        Therapy Charges for Today     Code Description Service Date Service Provider Modifiers Qty    21356960965 HC PT THER PROC EA 15 MIN 11/9/2019 Gabi Stewart, PTA GP 1    53318385445 HC GAIT TRAINING EA 15 MIN 11/9/2019 Gabi Stewart, PTA GP 1    76503265473 HC GAIT TRAINING EA 15 MIN 11/10/2019 Gabi Stewart, PTA GP 1    57524130305 HC PT THER PROC EA 15 MIN 11/10/2019 Gabi Stewart, PTA GP 1          PT G-Codes  Outcome Measure Options: AM-PAC 6 Clicks Daily Activity (OT)  AM-PAC 6 Clicks Score (PT): 18  AM-PAC 6 Clicks Score  (OT): 21    Gabi Stewart, PTA  11/10/2019

## 2019-11-11 ENCOUNTER — READMISSION MANAGEMENT (OUTPATIENT)
Dept: CALL CENTER | Facility: HOSPITAL | Age: 79
End: 2019-11-11

## 2019-11-11 NOTE — OUTREACH NOTE
Prep Survey      Responses   Facility patient discharged from?  [facility]   Is patient eligible?  Yes   Discharge diagnosis  total left knee   Does the patient have one of the following disease processes/diagnoses(primary or secondary)?  Total Joint Replacement   Does the patient have Home health ordered?  Yes   What is the Home health agency?   [facility]   Is there a DME ordered?  No   Comments regarding appointments  office to call   Medication alerts for this patient  ASA   Prep survey completed?  Yes          Agnes Mclaughlin RN

## 2019-11-11 NOTE — THERAPY DISCHARGE NOTE
Acute Care - Occupational Therapy Discharge Summary  [facility]     Patient Name: Hailey Dickerson  : 1940  MRN: [id number]    Today's Date: 2019  Onset of Illness/Injury or Date of Surgery: 19    Date of Referral to OT: 19  Referring Physician: MD Jefry      Admit Date: 2019        OT Recommendation and Plan    Visit Dx:    ICD-10-CM ICD-9-CM   1. Primary osteoarthritis of left knee M17.12 715.16   2. Impaired mobility and ADLs Z74.09 799.89   3. Impaired mobility Z74.09 799.89               Rehab Goal Summary     Row Name 19 0700             Transfer Goal 1 (OT)    Activity/Assistive Device (Transfer Goal 1, OT)  transfers, all;sit-to-stand/stand-to-sit;bed-to-chair/chair-to-bed;toilet;shower chair;walk-in shower  -TS      [place] Level/Cues Needed (Transfer Goal 1, OT)  conditional independence  -TS      Time Frame (Transfer Goal 1, OT)  long term goal (LTG);by discharge  -TS      Progress/Outcome (Transfer Goal 1, OT)  goal not met  -TS         Bathing Goal 1 (OT)    Activity/Assistive Device (Bathing Goal 1, OT)  bathing skills, all;upper body bathing;lower body bathing;long-handled sponge;reacher;shower chair  -TS      [place] Level/Cues Needed (Bathing Goal 1, OT)  minimum assist (75% or more patient effort);verbal cues required  -TS      Time Frame (Bathing Goal 1, OT)  long term goal (LTG)  -TS      Progress/Outcomes (Bathing Goal 1, OT)  goal not met  -TS         Dressing Goal 1 (OT)    Activity/Assistive Device (Dressing Goal 1, OT)  lower body dressing;reacher;sock-aid  -TS      [place]/Cues Needed (Dressing Goal 1, OT)  minimum assist (75% or more patient effort);verbal cues required  -TS      Time Frame (Dressing Goal 1, OT)  long term goal (LTG);by discharge  -TS      Progress/Outcome (Dressing Goal 1, OT)  goal not met  -TS        User Key  (r) = Recorded By, (t) = Taken By, (c) = Cosigned By    Initials Name Provider Type Discipline    TS  Salina Garcia COTA/L Occupational Therapy Assistant OT          Outcome Measures     Row Name 11/08/19 1000             How much help from another is currently needed...    Putting on and taking off regular lower body clothing?  3  -TS      Bathing (including washing, rinsing, and drying)  3  -TS      Toileting (which includes using toilet bed pan or urinal)  3  -TS      Putting on and taking off regular upper body clothing  4  -TS      Taking care of personal grooming (such as brushing teeth)  4  -TS      Eating meals  4  -TS      AM-PAC 6 Clicks Score (OT)  21  -TS        User Key  (r) = Recorded By, (t) = Taken By, (c) = Cosigned By    Initials Name Provider Type    TS Salina Garcia COTA/L Occupational Therapy Assistant          Therapy Suggested Charges     Code   Minutes Charges    38625 (CPT®) Hc Ot Neuromusc Re Education Ea 15 Min      61455 (CPT®) Hc Ot Ther Proc Ea 15 Min      43356 (CPT®) Hc Ot Therapeutic Act Ea 15 Min      08216 (CPT®) Hc Ot Manual Therapy Ea 15 Min      25538 (CPT®) Hc Ot Iontophoresis Ea 15 Min      35861 (CPT®) Hc Ot Elec Stim Ea-Per 15 Min      12890 (CPT®) Hc Ot Ultrasound Ea 15 Min      15762 (CPT®) Hc Ot Self Care/Mgmt/Train Ea 15 Min 45 3    Total  45 3              OT Discharge Summary  Reason for Discharge: Discharge from facility  Outcomes Achieved: Refer to plan of care for updates on goals achieved  Discharge Destination: Home with assist      JW Earl  11/11/2019

## 2019-11-11 NOTE — PAYOR COMM NOTE
"DC HOME 11-10-19  [id number]    Hailey Dickerson (79 y.o. Female)     Date of Birth Social Security Number Address Home Phone MRN    1940  [address] [address] [phone] [id number]    Jew Marital Status          Gnosticist        Admission Date Admission Type Admitting Provider Attending Provider Department, Room/Bed    11/6/19 Elective Rashaad Capps MD  [facility] 3A, 330/1    Discharge Date Discharge Disposition Discharge Destination        11/10/2019 Home or Self Care              Attending Provider:  (none)   Allergies:  Lortab [Hydrocodone-acetaminophen], Ciprocinonide [Fluocinolone], Sulfa Antibiotics    Isolation:  None   Infection:  None   Code Status:  Prior    Ht:  160 cm (62.99\")   Wt:  88.2 kg (194 lb 7.1 oz)    Admission Cmt:  None   Principal Problem:  None                Active Insurance as of 11/6/2019     Primary Coverage     Payor Plan Insurance Group Employer/Plan Group    ANTH MEDICARE REPLACEMENT ANTH MEDICARE ADVANTAGE [id number]     Payor Plan Address Payor Plan Phone Number Payor Plan Fax Number Effective Dates    [address] [phone]  1/1/2019 - None Entered    [facility] [id number]       Subscriber Name Subscriber Birth Date Member ID       HAILEY DICKERSON 1940 [id number]                 Emergency Contacts      (Rel.) Home Phone Work Phone Mobile Phone    Darin Dickerson (Spouse) [phone] -- --               Discharge Summary      Rashaad Capps MD at 11/09/19 1040          [facility]  DISCHARGE SUMMARY       Date of Admission: 11/6/2019  Date of Discharge:  11/10/19  Primary Care Physician: Titi Cotton MD    Presenting Problem/History of Present Illness:  Primary osteoarthritis of left knee [M17.12]  Osteoarthritis of left knee [M17.12]     Final Discharge Diagnoses:  Active Hospital Problems    Diagnosis   • Primary osteoarthritis of left knee   • Osteoarthritis of " left knee       Consults: None    Procedures Performed: Left total knee replacement    Pertinent Test Results: YUDY globin stable    History of Present Illness on Day of Discharge: On discharge    Hospital Course:  The patient is a 79 y.o. female who presented to [facility] with need for primary osteoarthritis of the left knee.  On day of admission left total knee replaced carried out.  She is done very well with her knee.  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        /53 (BP Location: Left arm, Patient Position: Lying)   Pulse 104   Temp 98.5 °F (36.9 °C) (Oral)   Resp 17   LMP  (LMP Unknown)   SpO2 95%   Breastfeeding? No       Discharge Medications:     Discharge Medications      ASK your doctor about these medications      Instructions Start Date   alendronate 70 MG tablet  Commonly known as:  FOSAMAX   70 mg, Oral, Every 7 Days      butalbital-acetaminophen-caffeine -40 MG per tablet  Commonly known as:  FIORICET, ESGIC   One every 6 hours when necessary headache      citalopram 20 MG tablet  Commonly known as:  CeleXA   20 mg, Oral, Daily      Cranberry 125 MG tablet   1 tablet, Oral, Daily      indapamide 2.5 MG tablet  Commonly known as:  LOZOL   2.5 mg, Oral, Every Morning      lisinopril 10 MG tablet  Commonly known as:  PRINIVIL,ZESTRIL   10 mg, Oral, Daily      multivitamin with minerals tablet tablet   1 tablet, Oral, Daily      raNITIdine 150 MG tablet  Commonly known as:  ZANTAC   150 mg, Oral, Nightly      simvastatin 20 MG tablet  Commonly known as:  ZOCOR   20 mg, Oral, Nightly             Discharge Diet: Regular  Discharge Care Plan/Instructions: #1 she can bear weight as tolerated #2 she can shower and redress wound #3 she will have home health physical therapy #4 she will be maintained on aspirin No. 5 she is to follow-up in the  office    Follow-up Appointments: Monday, November 18  Future Appointments   Date Time Provider Department Center   3/31/2020  9:00 AM Titi Cotton MD [facility] None         Rashaad Capps MD  11/09/19  10:40 AM        Electronically signed by Rashaad Capps MD at 11/09/19 1049

## 2019-11-12 RX ORDER — FAMOTIDINE 40 MG/1
40 TABLET, FILM COATED ORAL
Qty: 90 TABLET | Refills: 3 | Status: SHIPPED | OUTPATIENT
Start: 2019-11-12 | End: 2020-06-08 | Stop reason: RX

## 2019-11-13 ENCOUNTER — READMISSION MANAGEMENT (OUTPATIENT)
Dept: CALL CENTER | Facility: HOSPITAL | Age: 79
End: 2019-11-13

## 2019-11-13 NOTE — OUTREACH NOTE
Total Joint Week 1 Survey      Responses   Facility patient discharged from?  [facility]   Does the patient have one of the following disease processes/diagnoses(primary or secondary)?  Total Joint Replacement   Is there a successful TCM telephone encounter documented?  No   Joint surgery performed?  Knee   Week 1 attempt successful?  No   Unsuccessful attempts  Attempt 1          Abby Canada RN

## 2019-11-15 ENCOUNTER — READMISSION MANAGEMENT (OUTPATIENT)
Dept: CALL CENTER | Facility: HOSPITAL | Age: 79
End: 2019-11-15

## 2019-11-15 NOTE — OUTREACH NOTE
Total Joint Week 1 Survey      Responses   Facility patient discharged from?  [facility]   Does the patient have one of the following disease processes/diagnoses(primary or secondary)?  Total Joint Replacement   Is there a successful TCM telephone encounter documented?  No   Joint surgery performed?  Knee   Week 1 attempt successful?  Yes   Call start time  1738   Call end time  1745   Has the patient been back in either the hospital or Emergency Department since discharge?  No   Discharge diagnosis  total left knee   Medication alerts for this patient  ASA   Does the patient have all medications related to this admission filled (includes all antibiotics, pain medications, etc.)  Yes   Is the patient taking all medications as directed (includes completed medication regime)?  Yes   Is the patient able to teach back alternate methods of pain control?  Ice, Correct alignment, Reposition, Short, frequent activity, Other   Does the patient have a follow up appointment with their surgeon?  Yes   Has the patient kept scheduled appointments due by today?  N/A   What is the Home health agency?   [facility]   Has home health visited the patient within 72 hours of discharge?  Yes   Psychosocial issues?  No   Has the patient began therapy sessions (either in the home or as an out patient)?  Yes   Does the patient have a wound vac in place?  N/A   Has the patient fallen since discharge?  No   Did the patient receive a copy of their discharge instructions?  Yes   What is the patient's perception of their functional status since discharge?  Improving   Is the patient able to teach back signs and symptoms of infection?  Temp >100.4 for 24h or longer, Incisional drainage, Blisters around incision, Increased swelling or redness around incision (not associated with surgical edema), Severe discomfort or pain, Changes in mobility, Shortness of breath or chest pain   Is the patient able to teach back how to prevent infection?  Check incision  daily, Keep incision covered if drainage, Shower only as directed by surgeon, Eat well-balanced diet, Wash hands before and after touching incision, No lotion or creams, No tub baths, hot tub or swimming, Monitor blood sugar if diabetic, Keep incision covered if pets in house   Is the patient able to teach back signs and symptoms of DVT?  Redness in calf, Severe pain in calf, Swelling in calf, Shortness of breath or chest pain, Area hot to touch   Is the patient able to teach back home safety measures?  Ability to shower, Accessibility to necessary areas in home, Modifications to reach items, Modifications with ADLs such as dressing, cooking, toileting   Did the patient implement home safety suggestions from pre-surgery classes if attended?  Yes   Is the patient/caregiver able to teach back the hierarchy of who to call/visit for symptoms/problems? PCP, Specialist, Home health nurse, Urgent Care, ED, 911  Yes   Week 1 call completed?  Yes   Wrap up additional comments  Has peroneal nerve palsy.  She is able to bear weight but she is having difficulty lifting her leg.          Bessy Marcos, RN

## 2019-11-19 ENCOUNTER — TRANSCRIBE ORDERS (OUTPATIENT)
Dept: PHYSICAL THERAPY | Facility: CLINIC | Age: 79
End: 2019-11-19

## 2019-11-19 DIAGNOSIS — Z96.652 AFTERCARE FOLLOWING LEFT KNEE JOINT REPLACEMENT SURGERY: Primary | ICD-10-CM

## 2019-11-19 DIAGNOSIS — M25.562 LEFT KNEE PAIN, UNSPECIFIED CHRONICITY: ICD-10-CM

## 2019-11-19 DIAGNOSIS — Z47.1 AFTERCARE FOLLOWING LEFT KNEE JOINT REPLACEMENT SURGERY: Primary | ICD-10-CM

## 2019-11-22 ENCOUNTER — READMISSION MANAGEMENT (OUTPATIENT)
Dept: CALL CENTER | Facility: HOSPITAL | Age: 79
End: 2019-11-22

## 2019-11-22 NOTE — OUTREACH NOTE
Total Joint Week 2 Survey      Responses   Facility patient discharged from?  [place]   Does the patient have one of the following disease processes/diagnoses(primary or secondary)?  Total Joint Replacement   Joint surgery performed?  Knee   Week 2 attempt successful?  Yes   Call start time  1540   Call end time  1545   Has the patient been back in either the hospital or Emergency Department since discharge?  No   Discharge diagnosis  total left knee   Does the patient have all medications related to this admission filled (includes all antibiotics, pain medications, etc.)  Yes   Is the patient taking all medications as directed (includes completed medication regime)?  Yes   Is the patient able to teach back alternate methods of pain control?  Correct alignment, Reposition   Does the patient have a follow up appointment with their surgeon?  Yes   Has the patient kept scheduled appointments due by today?  Yes   What is the Home health agency?   [facility]   What DME was ordered?  PT is using walker currently   Psychosocial issues?  No   When is the first therapy visit scheduled (PO Day) including how many days per week   Pt will start outpt PT on monday   Has the patient began therapy sessions (either in the home or as an out patient)?  Yes   Does the patient have a wound vac in place?  No   Has the patient fallen since discharge?  No   If the patient has fallen, were there any injuries?  No   Comments  PT reports she does not have feeling in toes and foot yet. Providers are aware.   Did the patient receive a copy of their discharge instructions?  Yes   Nursing interventions  Reviewed instructions with patient   What is the patient's perception of their functional status since discharge?  Improving   Is the patient able to teach back signs and symptoms of infection?  Temp >100.4 for 24h or longer, Incisional drainage, Blisters around incision, Increased swelling or redness around incision (not associated with surgical  edema), Severe discomfort or pain, Changes in mobility, Shortness of breath or chest pain   Is the patient able to teach back how to prevent infection?  Check incision daily, Eat well-balanced diet, Wash hands before and after touching incision, Keep incision covered if drainage, Keep incision covered if pets in house, Shower only as directed by surgeon, Monitor blood sugar if diabetic   Is the patient able to teach back signs and symptoms of DVT?  Redness in calf, Area hot to touch, Shortness of breath or chest pain, Swelling in calf, Severe pain in calf   Is the patient able to teach back home safety measures?  Modifications with ADLs such as dressing, cooking, toileting   Did the patient implement home safety suggestions from pre-surgery classes if attended?  Yes   Is the patient/caregiver able to teach back the hierarchy of who to call/visit for symptoms/problems? PCP, Specialist, Home health nurse, Urgent Care, ED, 911  Yes   Week 2 call completed?  Yes          Gabriella Cordero RN

## 2019-11-25 ENCOUNTER — TREATMENT (OUTPATIENT)
Dept: PHYSICAL THERAPY | Facility: CLINIC | Age: 79
End: 2019-11-25

## 2019-11-25 DIAGNOSIS — Z47.1 AFTERCARE FOLLOWING LEFT KNEE JOINT REPLACEMENT SURGERY: Primary | ICD-10-CM

## 2019-11-25 DIAGNOSIS — M25.562 LEFT KNEE PAIN, UNSPECIFIED CHRONICITY: ICD-10-CM

## 2019-11-25 DIAGNOSIS — Z96.652 AFTERCARE FOLLOWING LEFT KNEE JOINT REPLACEMENT SURGERY: Primary | ICD-10-CM

## 2019-11-25 PROCEDURE — 97161 PT EVAL LOW COMPLEX 20 MIN: CPT | Performed by: PHYSICAL THERAPIST

## 2019-11-25 PROCEDURE — 97110 THERAPEUTIC EXERCISES: CPT | Performed by: PHYSICAL THERAPIST

## 2019-11-25 NOTE — PROGRESS NOTES
Physical Therapy Initial Evaluation and Plan of Care      Patient: Hailey Dickerson   : 1940  Diagnosis/ICD-10 Code:  Aftercare following left knee joint replacement surgery [Z47.1, Z96.652]  Referring practitioner: Rashaad Capps MD  Date of Initial Visit: 2019  Today's Date: 2019  Patient seen for 1 sessions    Recheck due: 19  MD appt: 19         Subjective Questionnaire: LEFS:       Subjective Evaluation    History of Present Illness  Date of surgery: 2019  Mechanism of injury: Pt presents s/p L TKA on 19. Had chronic knee pain, received several injections, was time for surgery. Had home health PT after surgery, was discharged from home health on 19. Pt is currently using RW for ambulation, not using AD prior to surgery. Pt reports she has L drop foot due to the nerve block she received during surgery. MD aware. PT Did discuss with pt she may need an AFO if it doesn't begin improve. Pt states MD has already mentioned that as well.      Patient Occupation: Pt is retired. Pt enjoys walking Quality of life: good    Pain  Current pain rating: 3  At best pain ratin  At worst pain ratin  Quality: dull ache and tight  Relieving factors: ice  Aggravating factors: overhead activity, squatting, prolonged positioning and sleeping  Progression: improved    Social Support  Lives in: one-story house (No steps to enter home)  Lives with: spouse    Patient Goals  Patient goals for therapy: decreased edema, decreased pain, increased strength, increased motion and return to sport/leisure activities             Objective       Observations     Right Knee   Positive for edema.     Additional Observation Details  No acute distress. Ambulates with RW, steppage gait due to L drop foot. Edema present in L knee, distal leg and foot. Incision is healing quite well, steri strips still in place. No drainage noted.    Palpation   Left   Tenderness of the rectus femoris.      Additional Palpation Details  Taut at L distal quad (vastus lat and rectus fem)    Tenderness   Left Knee   Tenderness in the lateral joint line and medial joint line.     Neurological Testing     Sensation     Knee   Left Knee   Diminished: light touch   Paresthesia: light touch    Right Knee   Intact: light touch     Comments   Left light touch: Proximal ankle and into dorsal foot.     Active Range of Motion   Left Knee   Flexion: 92 degrees   Extension: 2 degrees     Right Knee   Flexion: 122 degrees   Extension: 0 degrees     Additional Active Range of Motion Details  L knee: AAROM flex 97 deg    Limited active L ankle DF. Rests in plantar flexion    Patellar Mobility     Additional Patellar Mobility Details  Taut with patella mobilizations on L    Strength/Myotome Testing     Left Knee   Flexion: 4  Extension: 4  Quadriceps contraction: fair (Able to perform independent SLR with mild quad lag noted; did require cuing for good quad contraction; tends to compensate with hip with quad set)    Right Knee   Flexion: 5  Extension: 5  Quadriceps contraction: good    Left Ankle/Foot   Dorsiflexion: 2-  Plantar flexion: 5    Right Ankle/Foot   Dorsiflexion: 5  Plantar flexion: 5    Additional Strength Details  L foot drop         Assessment & Plan     Assessment  Impairments: abnormal gait, abnormal or restricted ROM, activity intolerance, impaired balance, impaired physical strength, lacks appropriate home exercise program and pain with function  Assessment details: Pt is an 79 y.o. Female presenting s/p L TKA on 11/6/19. Had home health PT after surgery and was discharged on 11/21/19. Pt demonstrates an overall decrease in L knee functional ROM, strength, and stability which is limiting performance of functional mobility at this time. Pt's gait is more limited due to acute onset of L foot drop due to nerve block during surgery. Pt has trace DF at L foot. Pt able to perform independent SLR with only mild quad lag  "noted. Does need cues for good quad set as she tends to compensate with hip. Pt will benefit from skilled PT services to address these deficits for improvements in overall functional knee ROM, strength/stability, gait mechanics, balance/proprioception, and overall tolerance to daily functional activities.  Prognosis: good  Functional Limitations: lifting, walking, uncomfortable because of pain and standing  Goals  Plan Goals: STG's by 12/16/19  1) Demonstrate independence/compliance in HEP  2) Demonstrate improved L knee ext AROM to 0 deg  3) Demonstrate improved L knee flex AROM to 115 deg or greater  4) Perform independent active SLR flex 2x10 with no quad lag present, good eccentric control noted    LTG's by 1/6/19  1) Subjectively report 60% overall improvement or greater  2) Improve LEFS score to 40/80 or greater  3) Demonstrate improved L knee flex/ext MMT to 5/5  4) Demonstrate improved L hip flex/abd MMT to 4+/5 or greater  5) Ambulate independently with SPC with good TKE noted LLE (pending AFO/L foot drop)  6) Demonstrate good L eccentric quad control with fwd step downs on 6\" step    Plan  Therapy options: will be seen for skilled physical therapy services  Planned modality interventions: cryotherapy and electrical stimulation/Russian stimulation  Planned therapy interventions: balance/weight-bearing training, body mechanics training, flexibility, functional ROM exercises, gait training, home exercise program, joint mobilization, manual therapy, neuromuscular re-education, postural training, soft tissue mobilization, strengthening, stretching and therapeutic activities  Duration in visits: 12  Treatment plan discussed with: patient  Plan details: Follow up with functional ROM activities, patella/tibiofemoral joint mobilizations PRN. Quad/knee and hip strengthening. Work on ankle AROM/DF as appropriate (if pt able). Gait training, balance/proprioception activities as able. Progress CKC strengthening as " able. Pt returns to MD on 12/16/19. Will potentially be getting AFO at that point if no improvements made over the next few weeks.        Timed:  Manual Therapy:    3     mins  05083;  Therapeutic Exercise:    22     mins  06113;     Neuromuscular Hong:        mins  19849;    Therapeutic Activity:          mins  66655;     Gait Training:           mins  83355;     Ultrasound:          mins  96238;    Electrical Stimulation:         mins  44983 ( );    Untimed:  Electrical Stimulation:         mins  56797 ( );  Mechanical Traction:         mins  18543;     Timed Treatment:   25   mins   Total Treatment:     47   mins    PT SIGNATURE: Cary Ramon, MINERVA   DATE TREATMENT INITIATED: 11/25/2019    Initial Certification  Certification Period: 2/23/2020  I certify that the therapy services are furnished while this patient is under my care.  The services outlined above are required by this patient, and will be reviewed every 90 days.     PHYSICIAN: Rashaad Capps MD      DATE:     Please sign and return via fax to  .. Thank you, [facility].

## 2019-11-27 ENCOUNTER — TREATMENT (OUTPATIENT)
Dept: PHYSICAL THERAPY | Facility: CLINIC | Age: 79
End: 2019-11-27

## 2019-11-27 DIAGNOSIS — Z47.1 AFTERCARE FOLLOWING LEFT KNEE JOINT REPLACEMENT SURGERY: Primary | ICD-10-CM

## 2019-11-27 DIAGNOSIS — M25.562 LEFT KNEE PAIN, UNSPECIFIED CHRONICITY: ICD-10-CM

## 2019-11-27 DIAGNOSIS — Z96.652 AFTERCARE FOLLOWING LEFT KNEE JOINT REPLACEMENT SURGERY: Primary | ICD-10-CM

## 2019-11-27 PROCEDURE — G0283 ELEC STIM OTHER THAN WOUND: HCPCS | Performed by: PHYSICAL THERAPIST

## 2019-11-27 PROCEDURE — 97110 THERAPEUTIC EXERCISES: CPT | Performed by: PHYSICAL THERAPIST

## 2019-11-27 NOTE — PROGRESS NOTES
"   Physical Therapy Daily Progress Note      Patient: Hailey Dickerson   : 1940  Referring practitioner: Rashaad Capps MD  Date of Initial Visit: Type: THERAPY  Noted: 2019  Today's Date: 2019  Patient seen for 2 sessions    Recheck due: 19  MD appt: 19       Hailey Dickerson reports: no improvement yet        Subjective Evaluation    History of Present Illness    Subjective comment: pt states that she would be doing very good if her foot would cooperate. Pt states that the MD is starting her on new medication for it todayPain  Current pain rating: 3           Objective       Active Range of Motion   Left Knee   Extension: 2 degrees      See Exercise, Manual, and Modality Logs for complete treatment.       Assessment & Plan     Assessment  Assessment details: Pt educated on heel prop ext S at home. Pt requiring max amount of education for therex and what her goals should be for ROM. Pt frustrated with L foot not working. Ext AROM did not change today, however significant increase in flexion. Decrease pain post IFC/ice    Goals  Plan Goals: STG's by 19  1) Demonstrate independence/compliance in HEP  2) Demonstrate improved L knee ext AROM to 0 deg  3) Demonstrate improved L knee flex AROM to 115 deg or greater  4) Perform independent active SLR flex 2x10 with no quad lag present, good eccentric control noted    LTG's by 19  1) Subjectively report 60% overall improvement or greater  2) Improve LEFS score to 40/80 or greater  3) Demonstrate improved L knee flex/ext MMT to 5/5  4) Demonstrate improved L hip flex/abd MMT to 4+/5 or greater  5) Ambulate independently with SPC with good TKE noted LLE (pending AFO/L foot drop)  6) Demonstrate good L eccentric quad control with fwd step downs on 6\" step    Plan  Duration in visits: 12  Plan details: Try mini squats next. Possible LAQ. Jt mobes for ext.        Visit Diagnoses:    ICD-10-CM ICD-9-CM   1. Aftercare following left knee " joint replacement surgery Z47.1 V54.81    Z96.652 V43.65   2. Left knee pain, unspecified chronicity M25.562 719.46       Progress per Plan of Care and Progress strengthening /stabilization /functional activity           Timed:  Manual Therapy:    2     mins  74629;  Therapeutic Exercise:    43     mins  38831;     Neuromuscular Hong:        mins  87043;    Therapeutic Activity:          mins  30688;     Gait Training:           mins  91865;     Ultrasound:          mins  82634;    Electrical Stimulation:         mins  16542 ( );    Untimed:  Electrical Stimulation:   15      mins  96179 ( );  Mechanical Traction:         mins  75895;     Timed Treatment:   45   mins   Total Treatment:     60   mins  Marilyn Ghosh, [facility]  Physical Therapist

## 2019-12-02 ENCOUNTER — TREATMENT (OUTPATIENT)
Dept: PHYSICAL THERAPY | Facility: CLINIC | Age: 79
End: 2019-12-02

## 2019-12-02 DIAGNOSIS — Z96.652 AFTERCARE FOLLOWING LEFT KNEE JOINT REPLACEMENT SURGERY: Primary | ICD-10-CM

## 2019-12-02 DIAGNOSIS — M25.562 LEFT KNEE PAIN, UNSPECIFIED CHRONICITY: ICD-10-CM

## 2019-12-02 DIAGNOSIS — Z47.1 AFTERCARE FOLLOWING LEFT KNEE JOINT REPLACEMENT SURGERY: Primary | ICD-10-CM

## 2019-12-02 PROCEDURE — G0283 ELEC STIM OTHER THAN WOUND: HCPCS | Performed by: PHYSICAL THERAPIST

## 2019-12-02 PROCEDURE — 97110 THERAPEUTIC EXERCISES: CPT | Performed by: PHYSICAL THERAPIST

## 2019-12-02 NOTE — PROGRESS NOTES
"   Physical Therapy Daily Progress Note      Patient: Hailey Dickerson   : 1940  Referring practitioner: Rashaad Capps MD  Date of Initial Visit: Type: THERAPY  Noted: 2019  Today's Date: 2019  Patient seen for 3 sessions    Recheck due: 19  MD appt: 19       Hailey Dickerson reports: 25% improvement        Subjective Evaluation    History of Present Illness    Subjective comment: pt states that she has seen no improvement in her foot since starting the new medication. Knee is feeling pretty good todayPain  Current pain ratin           Objective       Active Range of Motion   Left Knee   Flexion: 109 degrees   Extension: 2 degrees     Ambulation     Comments   Ambulates with RW. Drop foot on LLE     See Exercise, Manual, and Modality Logs for complete treatment.       Assessment & Plan     Assessment  Assessment details: No change in extension today- reinforced heelprop ext S at home, pt verbalized understanding. Did improve AROM knee flexion. Required max cues, verbal and tacitle, for standing TKE with tband.     Goals  Plan Goals: STG's by 19  1) Demonstrate independence/compliance in HEP  2) Demonstrate improved L knee ext AROM to 0 deg  3) Demonstrate improved L knee flex AROM to 115 deg or greater  4) Perform independent active SLR flex 2x10 with no quad lag present, good eccentric control noted    LTG's by 19  1) Subjectively report 60% overall improvement or greater  2) Improve LEFS score to 40/80 or greater  3) Demonstrate improved L knee flex/ext MMT to 5/5  4) Demonstrate improved L hip flex/abd MMT to 4+/5 or greater  5) Ambulate independently with SPC with good TKE noted LLE (pending AFO/L foot drop)  6) Demonstrate good L eccentric quad control with fwd step downs on 6\" step      Plan  Duration in visits: 12  Plan details: Update HEP next visit- Add standing CKC exercises.        Visit Diagnoses:    ICD-10-CM ICD-9-CM   1. Aftercare following left knee joint " replacement surgery Z47.1 V54.81    Z96.652 V43.65   2. Left knee pain, unspecified chronicity M25.562 719.46       Progress per Plan of Care and Progress strengthening /stabilization /functional activity           Timed:  Manual Therapy:    3     mins  31524;  Therapeutic Exercise:    40     mins  23977;     Neuromuscular Hong:        mins  98790;    Therapeutic Activity:          mins  50729;     Gait Training:           mins  38713;     Ultrasound:          mins  53920;    Electrical Stimulation:         mins  89118 ( );    Untimed:  Electrical Stimulation:   15      mins  80348 ( );  Mechanical Traction:         mins  52770;     Timed Treatment:   43   mins   Total Treatment:     58   mins  Marilyn Ghosh, [facility]  Physical Therapist

## 2019-12-04 ENCOUNTER — TREATMENT (OUTPATIENT)
Dept: PHYSICAL THERAPY | Facility: CLINIC | Age: 79
End: 2019-12-04

## 2019-12-04 DIAGNOSIS — Z47.1 AFTERCARE FOLLOWING LEFT KNEE JOINT REPLACEMENT SURGERY: Primary | ICD-10-CM

## 2019-12-04 DIAGNOSIS — Z96.652 AFTERCARE FOLLOWING LEFT KNEE JOINT REPLACEMENT SURGERY: Primary | ICD-10-CM

## 2019-12-04 DIAGNOSIS — M25.562 LEFT KNEE PAIN, UNSPECIFIED CHRONICITY: ICD-10-CM

## 2019-12-04 PROCEDURE — G0283 ELEC STIM OTHER THAN WOUND: HCPCS | Performed by: PHYSICAL THERAPIST

## 2019-12-04 PROCEDURE — 97140 MANUAL THERAPY 1/> REGIONS: CPT | Performed by: PHYSICAL THERAPIST

## 2019-12-04 PROCEDURE — 97110 THERAPEUTIC EXERCISES: CPT | Performed by: PHYSICAL THERAPIST

## 2019-12-04 NOTE — PROGRESS NOTES
"   Physical Therapy Daily Progress Note      Patient: Hailey Dickerson   : 1940  Referring practitioner: Rashaad Capps MD  Date of Initial Visit: Type: THERAPY  Noted: 2019  Today's Date: 2019  Patient seen for 4 sessions  Recheck due: 19  MD appt: 19       Hailey Dickerson reports: 25% impr        Subjective Evaluation    History of Present Illness    Subjective comment: pt states knee doing well; hardly use pn med; just \"this dead foot\"Pain  Current pain ratin           Objective       Active Range of Motion   Left Knee   Flexion: 111 degrees   Extension: 2 degrees      See Exercise, Manual, and Modality Logs for complete treatment.       Assessment & Plan     Assessment  Assessment details: Pt did well with Rx, better util ball wall for TKE CKC, however still needed a lot of cues. Incr flex; cont footdrop, might benefit from trial of Palestinian/NMES-PT agreed.    Goals  Plan Goals: STG's by 19  1) Demonstrate independence/compliance in HEP  2) Demonstrate improved L knee ext AROM to 0 deg  3) Demonstrate improved L knee flex AROM to 115 deg or greater  4) Perform independent active SLR flex 2x10 with no quad lag present, good eccentric control noted    LTG's by 19  1) Subjectively report 60% overall improvement or greater  2) Improve LEFS score to 40/80 or greater  3) Demonstrate improved L knee flex/ext MMT to 5/5  4) Demonstrate improved L hip flex/abd MMT to 4+/5 or greater  5) Ambulate independently with SPC with good TKE noted LLE (pending AFO/L foot drop)  6) Demonstrate good L eccentric quad control with fwd step downs on 6\" step      Plan  Duration in visits: 12  Plan details: Update HEP NV with CR and MS(after good review); exc and activ as time permits(has addit time avail this treatment)          Visit Diagnoses:    ICD-10-CM ICD-9-CM   1. Aftercare following left knee joint replacement surgery Z47.1 V54.81    Z96.652 V43.65   2. Left knee pain, unspecified " chronicity M25.562 719.46       Progress per Plan of Care and Progress strengthening /stabilization /functional activity           Timed:  Manual Therapy:    8     mins  71566;  Therapeutic Exercise:    52     mins  30464;     Neuromuscular Hong:        mins  52457;    Therapeutic Activity:          mins  84241;     Gait Training:           mins  00657;     Ultrasound:          mins  70728;    Electrical Stimulation:         mins  18318 ( );    Untimed:  Electrical Stimulation:    15     mins  54008 ( );  Mechanical Traction:         mins  94882;     Timed Treatment:   60   mins   Total Treatment:     75   mins  Esperanza Garcia PTA  Physical Therapist

## 2019-12-06 ENCOUNTER — TREATMENT (OUTPATIENT)
Dept: PHYSICAL THERAPY | Facility: CLINIC | Age: 79
End: 2019-12-06

## 2019-12-06 ENCOUNTER — READMISSION MANAGEMENT (OUTPATIENT)
Dept: CALL CENTER | Facility: HOSPITAL | Age: 79
End: 2019-12-06

## 2019-12-06 DIAGNOSIS — Z47.1 AFTERCARE FOLLOWING LEFT KNEE JOINT REPLACEMENT SURGERY: Primary | ICD-10-CM

## 2019-12-06 DIAGNOSIS — Z96.652 AFTERCARE FOLLOWING LEFT KNEE JOINT REPLACEMENT SURGERY: Primary | ICD-10-CM

## 2019-12-06 DIAGNOSIS — M25.562 LEFT KNEE PAIN, UNSPECIFIED CHRONICITY: ICD-10-CM

## 2019-12-06 PROCEDURE — G0283 ELEC STIM OTHER THAN WOUND: HCPCS | Performed by: PHYSICAL THERAPIST

## 2019-12-06 PROCEDURE — 97110 THERAPEUTIC EXERCISES: CPT | Performed by: PHYSICAL THERAPIST

## 2019-12-06 NOTE — OUTREACH NOTE
Total Joint Month 1 Survey      Responses   Facility patient discharged from?  [facility]   Does the patient have one of the following disease processes/diagnoses(primary or secondary)?  Total Joint Replacement   Joint surgery performed?  Knee   Month 1 attempt successful?  No   Unsuccessful attempts  Attempt 1          Sobia Betts RN

## 2019-12-06 NOTE — PROGRESS NOTES
Physical Therapy Daily Progress Note      Patient: Hailey Dcikerson   : 1940  Referring practitioner: Rashaad Capps MD  Date of Initial Visit: Type: THERAPY  Noted: 2019  Today's Date: 2019  Patient seen for 5 sessions    Recheck due: 19  MD appt: 19       Hailey Dickerson reports: 25-30% improvement    Subjective Evaluation    History of Present Illness    Subjective comment: Pt states her knee was a little sore yesterday but feels better today. still no change in her foot drop. Knee doing better.Pain  Current pain ratin           Objective       Observations     Additional Observation Details  Ambulates with RW; left foot drop. Improved edema in L foot.    Active Range of Motion   Left Knee   Flexion: 114 degrees   Extension: 1 degrees      See Exercise, Manual, and Modality Logs for complete treatment.       Assessment & Plan     Assessment  Assessment details: Good improvements noted in knee flex AROM. Was able to achieve 1 deg of ext this date. Did well with mini squats after cues; added MS and standing CR to HEP. Better performance with TKE with ball on wall cues still required. Able to ambulate with cane during gait training, good sequencing. Educated pt that she could use cane some, as she was steady with gait, but to be cautious with foot drop. Continue use RW out of house.    Goals  Plan Goals: STG's by 19  1) Demonstrate independence/compliance in HEP  2) Demonstrate improved L knee ext AROM to 0 deg  3) Demonstrate improved L knee flex AROM to 115 deg or greater  4) Perform independent active SLR flex 2x10 with no quad lag present, good eccentric control noted    LTG's by 19  1) Subjectively report 60% overall improvement or greater  2) Improve LEFS score to 40/80 or greater  3) Demonstrate improved L knee flex/ext MMT to 5/5  4) Demonstrate improved L hip flex/abd MMT to 4+/5 or greater  5) Ambulate independently with SPC with good TKE noted LLE (pending  "AFO/L foot drop)  6) Demonstrate good L eccentric quad control with fwd step downs on 6\" step      Plan  Duration in visits: 12  Plan details: Try Colombian/NMES to anterior tib/peroneal nerve next visit. Continue to progress CKC strengthening activities as able. Continue knee ROM activities.          Visit Diagnoses:    ICD-10-CM ICD-9-CM   1. Aftercare following left knee joint replacement surgery Z47.1 V54.81    Z96.652 V43.65   2. Left knee pain, unspecified chronicity M25.562 719.46       Progress per Plan of Care and Progress strengthening /stabilization /functional activity           Timed:  Manual Therapy:    5     mins  56914;  Therapeutic Exercise:    41     mins  42265;     Neuromuscular Hong:        mins  97778;    Therapeutic Activity:          mins  94524;     Gait Trainin      mins  39660;     Ultrasound:          mins  41554;    Electrical Stimulation:         mins  23518 ( );    Untimed:  Electrical Stimulation:         mins  53868 ( );  Mechanical Traction:         mins  59864;     Timed Treatment:   50   mins   Total Treatment:     65   mins  Cary Ramon, PT  Physical Therapist                  "

## 2019-12-09 ENCOUNTER — READMISSION MANAGEMENT (OUTPATIENT)
Dept: CALL CENTER | Facility: HOSPITAL | Age: 79
End: 2019-12-09

## 2019-12-09 ENCOUNTER — TREATMENT (OUTPATIENT)
Dept: PHYSICAL THERAPY | Facility: CLINIC | Age: 79
End: 2019-12-09

## 2019-12-09 DIAGNOSIS — Z96.652 AFTERCARE FOLLOWING LEFT KNEE JOINT REPLACEMENT SURGERY: Primary | ICD-10-CM

## 2019-12-09 DIAGNOSIS — Z47.1 AFTERCARE FOLLOWING LEFT KNEE JOINT REPLACEMENT SURGERY: Primary | ICD-10-CM

## 2019-12-09 DIAGNOSIS — M25.562 LEFT KNEE PAIN, UNSPECIFIED CHRONICITY: ICD-10-CM

## 2019-12-09 PROCEDURE — G0283 ELEC STIM OTHER THAN WOUND: HCPCS | Performed by: PHYSICAL THERAPIST

## 2019-12-09 PROCEDURE — 97110 THERAPEUTIC EXERCISES: CPT | Performed by: PHYSICAL THERAPIST

## 2019-12-09 NOTE — PROGRESS NOTES
"   Physical Therapy Daily Progress Note      Patient: Hailey Dickerson   : 1940  Referring practitioner: Rashaad Capps MD  Date of Initial Visit: Type: THERAPY  Noted: 2019  Today's Date: 2019  Patient seen for 6 sessions    Recheck due: 19  MD appt: 19       Hailey Dickerson reports: 25-30% improvement        Subjective Evaluation    History of Present Illness    Subjective comment: pt states that her back and knee are hurting more today than usual- not sure if its weather or whatPain  Current pain ratin           Objective       Active Range of Motion   Left Knee   Extension: 0 degrees     Ambulation     Comments   Ambulates with RW, drop foot on LLE     See Exercise, Manual, and Modality Logs for complete treatment.       Assessment & Plan     Assessment  Assessment details: Pt able to improve AROM measurements today. Able to achieve full extension and 118° of flexion. Attempted NMES to anterior tib but no contraction.     Goals  Plan Goals: STG's by 19  1) Demonstrate independence/compliance in HEP  2) Demonstrate improved L knee ext AROM to 0 deg  3) Demonstrate improved L knee flex AROM to 115 deg or greater  4) Perform independent active SLR flex 2x10 with no quad lag present, good eccentric control noted    LTG's by 19  1) Subjectively report 60% overall improvement or greater  2) Improve LEFS score to 40/80 or greater  3) Demonstrate improved L knee flex/ext MMT to 5/5  4) Demonstrate improved L hip flex/abd MMT to 4+/5 or greater  5) Ambulate independently with SPC with good TKE noted LLE (pending AFO/L foot drop)  6) Demonstrate good L eccentric quad control with fwd step downs on 6\" step      Plan  Duration in visits: 12  Plan details: Cont NMES for 2-3 more visits to try and obtain a contraction; if no contraction by then discontinue- per 1° PT.         Visit Diagnoses:    ICD-10-CM ICD-9-CM   1. Aftercare following left knee joint replacement surgery Z47.1 " V54.81    Z96.652 V43.65   2. Left knee pain, unspecified chronicity M25.562 719.46       Progress per Plan of Care and Progress strengthening /stabilization /functional activity           Timed:  Manual Therapy:         mins  53270;  Therapeutic Exercise:    45     mins  75255;     Neuromuscular Hong:    5    mins  12574;    Therapeutic Activity:          mins  96081;     Gait Training:           mins  80339;     Ultrasound:          mins  88631;    Electrical Stimulation:         mins  83935 ( );    Untimed:  Electrical Stimulation:    15     mins  92918 ( );  Mechanical Traction:         mins  53416;     Timed Treatment:  50    mins   Total Treatment:    65    mins  Marilyn Ghsoh, [facility]  Physical Therapist

## 2019-12-09 NOTE — OUTREACH NOTE
"Total Joint Month 1 Survey      Responses   Facility patient discharged from?  [facility]   Does the patient have one of the following disease processes/diagnoses(primary or secondary)?  Total Joint Replacement   Joint surgery performed?  Knee   Month 1 attempt successful?  Yes   Call start time  1157   Call end time  1202   Has the patient been back in either the hospital or Emergency Department since discharge?  No   Discharge diagnosis  total left knee   Is the patient taking all medications as directed (includes completed medication regime)?  Yes   Comments regarding appointments  Pt will see surgeon next week   Has the patient kept scheduled appointments due by today?  Yes   Is the patient still receiving Home Health Services?  N/A   DME comments  Pt needs a cane to walk   Is the patient still attending therapy sessions(either in the home or as an outpatient)?  Yes [Pt goes to therapy 3x per week]   Has the patient fallen since discharge?  No   Comments  Pt reports she had a block and her foot has \"turned down\" due to nerve damage.     What is the patient's perception of their functional status since discharge?  Improving   Is the patient/caregiver able to teach back the hierarchy of who to call/visit for symptoms/problems? PCP, Specialist, Home health nurse, Urgent Care, ED, 911  Yes   Month 1 call completed?  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  she continues to do outpt therapy.          Gabriella Cordero RN  "

## 2019-12-11 ENCOUNTER — TREATMENT (OUTPATIENT)
Dept: PHYSICAL THERAPY | Facility: CLINIC | Age: 79
End: 2019-12-11

## 2019-12-11 DIAGNOSIS — Z47.1 AFTERCARE FOLLOWING LEFT KNEE JOINT REPLACEMENT SURGERY: Primary | ICD-10-CM

## 2019-12-11 DIAGNOSIS — M25.562 LEFT KNEE PAIN, UNSPECIFIED CHRONICITY: ICD-10-CM

## 2019-12-11 DIAGNOSIS — Z96.652 AFTERCARE FOLLOWING LEFT KNEE JOINT REPLACEMENT SURGERY: Primary | ICD-10-CM

## 2019-12-11 PROCEDURE — 97110 THERAPEUTIC EXERCISES: CPT | Performed by: PHYSICAL THERAPIST

## 2019-12-11 PROCEDURE — G0283 ELEC STIM OTHER THAN WOUND: HCPCS | Performed by: PHYSICAL THERAPIST

## 2019-12-11 NOTE — PROGRESS NOTES
"   Physical Therapy Daily Progress Note      Patient: Hailey Dickerson   : 1940  Referring practitioner: Rashaad Capps MD  Date of Initial Visit: Type: THERAPY  Noted: 2019  Today's Date: 2019  Patient seen for 7 sessions    Recheck due: 19  MD appt: 19       Hailey Dickerson reports: 40% improvement    Subjective Evaluation    History of Present Illness    Subjective comment: Pt states she feels like her knee is getting a lot beter. Foot still the same. Has been walking with her cane.Pain  Current pain rating: 3           Objective       Observations     Additional Observation Details  Ambulates with RW; continues with L drop foot.    Active Range of Motion   Left Knee   Flexion: 119 degrees   Extension: 0 degrees      See Exercise, Manual, and Modality Logs for complete treatment.       Assessment & Plan     Assessment  Assessment details: Pt able to achieve 0 deg of extension and is maintaining good knee flexion. Deferred NMES to anterior tib/peroneal nerve due to lack of contraction. PT did well with new [facility] therex for knee strengthening. Continues to do well with gait with cane when cautious of drop foot.    Goals  Plan Goals: STG's by 19  1) Demonstrate independence/compliance in HEP  2) Demonstrate improved L knee ext AROM to 0 deg  3) Demonstrate improved L knee flex AROM to 115 deg or greater  4) Perform independent active SLR flex 2x10 with no quad lag present, good eccentric control noted    LTG's by 19  1) Subjectively report 60% overall improvement or greater  2) Improve LEFS score to 40/80 or greater  3) Demonstrate improved L knee flex/ext MMT to 5/5  4) Demonstrate improved L hip flex/abd MMT to 4+/5 or greater  5) Ambulate independently with SPC with good TKE noted LLE (pending AFO/L foot drop)  6) Demonstrate good L eccentric quad control with fwd step downs on 6\" step      Plan  Duration in visits: 12  Plan details: Continue [facility] strengthening activities. " Add tband to TKE's for strengthening progression        Visit Diagnoses:    ICD-10-CM ICD-9-CM   1. Aftercare following left knee joint replacement surgery Z47.1 V54.81    Z96.652 V43.65   2. Left knee pain, unspecified chronicity M25.562 719.46       Progress per Plan of Care and Progress strengthening /stabilization /functional activity           Timed:  Manual Therapy:    5     mins  59930;  Therapeutic Exercise:    42     mins  76446;     Neuromuscular Hong:        mins  79415;    Therapeutic Activity:          mins  22951;     Gait Training:           mins  40429;     Ultrasound:          mins  62102;    Electrical Stimulation:         mins  50351 ( );    Untimed:  Electrical Stimulation:    15     mins  17378 ( );  Mechanical Traction:         mins  40732;     Timed Treatment:   47   mins   Total Treatment:     62   mins  Cary Ramon, PT  Physical Therapist

## 2019-12-13 ENCOUNTER — TREATMENT (OUTPATIENT)
Dept: PHYSICAL THERAPY | Facility: CLINIC | Age: 79
End: 2019-12-13

## 2019-12-13 DIAGNOSIS — Z47.1 AFTERCARE FOLLOWING LEFT KNEE JOINT REPLACEMENT SURGERY: Primary | ICD-10-CM

## 2019-12-13 DIAGNOSIS — Z96.652 AFTERCARE FOLLOWING LEFT KNEE JOINT REPLACEMENT SURGERY: Primary | ICD-10-CM

## 2019-12-13 PROCEDURE — G0283 ELEC STIM OTHER THAN WOUND: HCPCS | Performed by: PHYSICAL THERAPIST

## 2019-12-13 PROCEDURE — 97110 THERAPEUTIC EXERCISES: CPT | Performed by: PHYSICAL THERAPIST

## 2019-12-13 NOTE — PROGRESS NOTES
"   Physical Therapy Daily Progress Note      Patient: Hailey Dickerson   : 1940  Referring practitioner: Rashaad Capps MD  Date of Initial Visit: Type: THERAPY  Noted: 2019  Today's Date: 2019  Patient seen for 8 sessions  Recert due:  19  MD followup:  19       Hailey Dickerson reports: 40% improvement    Subjective Questionnaire:       Subjective  Pt reports that she tried 3 DME providers to find an AFO, but none available.  Pre RX pain 1/10.     Objective       Active Range of Motion   Left Knee   Flexion: 120 degrees   Extension: 0 degrees      See Exercise, Manual, and Modality Logs for complete treatment.       Assessment & Plan     Assessment  Assessment details: Pt progressing well with ROM and strength.  Foot drop is limiting her gait and she conts to use RW outside of her home.  Attempted to purchase AFO, but didn't find any at the DME providers she tried.  Has MD followup appt on Mon.  Asked MD for AFO order on her progress note.  We will contact [facility] for AFO with MD order       Goals  Plan Goals: STG's by 19  1) Demonstrate independence/compliance in HEP- Progressing  2) Demonstrate improved L knee ext AROM to 0 deg- Met  3) Demonstrate improved L knee flex AROM to 115 deg or greater  4) Perform independent active SLR flex 2x10 with no quad lag present, good eccentric control noted- Met    LTG's by 19  1) Subjectively report 60% overall improvement or greater  2) Improve LEFS score to 40/80 or greater  3) Demonstrate improved L knee flex/ext MMT to 5/5  4) Demonstrate improved L hip flex/abd MMT to 4+/5 or greater  5) Ambulate independently with SPC with good TKE noted LLE (pending AFO/L foot drop)  6) Demonstrate good L eccentric quad control with fwd step downs on 6\" step      Plan  Duration in visits: 12  Plan details: MD followup .  Note sent with pt.          Visit Diagnoses:    ICD-10-CM ICD-9-CM   1. Aftercare following left knee joint replacement " surgery Z47.1 V54.81    Z96.652 V43.65                  Timed:  Manual Therapy:         mins  40737;  Therapeutic Exercise:     45    mins  99905;     Neuromuscular Hong:        mins  23080;    Therapeutic Activity:          mins  66518;     Gait Training:           mins  72825;     Ultrasound:          mins  88130;    Electrical Stimulation:         mins  57380 ( );    Untimed:  Electrical Stimulation:    15     mins  31242 ( );  Mechanical Traction:         mins  46118;     Timed Treatment:   45  mins   Total Treatment:     60   mins  [facility]  Physical Therapist

## 2019-12-18 ENCOUNTER — TREATMENT (OUTPATIENT)
Dept: PHYSICAL THERAPY | Facility: CLINIC | Age: 79
End: 2019-12-18

## 2019-12-18 DIAGNOSIS — Z96.652 AFTERCARE FOLLOWING LEFT KNEE JOINT REPLACEMENT SURGERY: Primary | ICD-10-CM

## 2019-12-18 DIAGNOSIS — M25.562 LEFT KNEE PAIN, UNSPECIFIED CHRONICITY: ICD-10-CM

## 2019-12-18 DIAGNOSIS — Z47.1 AFTERCARE FOLLOWING LEFT KNEE JOINT REPLACEMENT SURGERY: Primary | ICD-10-CM

## 2019-12-18 PROCEDURE — G0283 ELEC STIM OTHER THAN WOUND: HCPCS | Performed by: PHYSICAL THERAPIST

## 2019-12-18 PROCEDURE — 97110 THERAPEUTIC EXERCISES: CPT | Performed by: PHYSICAL THERAPIST

## 2019-12-18 NOTE — PROGRESS NOTES
Re-Assessment / Re-Certification      Patient: Hailey Dickerson   : 1940  Diagnosis/ICD-10 Code:  Aftercare following left knee joint replacement surgery [Z47.1, Z96.652]  Referring practitioner: Rashaad Capps MD  Date of Initial Visit: Type: THERAPY  Noted: 2019  Today's Date: 2019  Patient seen for 9 sessions    Recheck due: 20  MD appt: TBD    Subjective:   Hailey Dickerson reports: 50% improvement  Subjective Questionnaire: LEFS: 35/80  Clinical Progress: improved  Home Program Compliance: Yes  Treatment has included: therapeutic exercise, neuromuscular re-education, manual therapy, therapeutic activity, gait training and cryotherapy    Subjective Evaluation    History of Present Illness    Subjective comment: Pt states the MD fitted her for an AFO but she won't be able to get it until the beginning of the year. States he also set her up for NCV test but they haven't called to schedule it yet. In regards to her knee she's doing much better. The drop foot is the main thing that's limiting her mobility. States she's been walking with her cane quite a bit.Pain  Current pain ratin         Objective       Observations     Additional Observation Details  Ambulates with RW into clinic. Used SPC during treatment. Continues with L drop foot which is inhibiting gait/mobility at this time.    Palpation   Left   Tenderness of the rectus femoris and vastus lateralis.     Tenderness   Left Knee   Tenderness in the medial joint line.     Neurological Testing     Sensation     Knee   Left Knee   Paresthesia: light touch    Right Knee   Intact: light touch     Comments   Left light touch: Low peroneal nerve distribution.     Active Range of Motion   Left Knee   Flexion: 119 degrees   Extension: 1 degrees     Patellar Mobility     Additional Patellar Mobility Details  Mildly limited with L patella mobilizations sup/inf.    Strength/Myotome Testing     Left Hip   Planes of Motion   Flexion:  4+  Abduction: 4    Left Knee   Flexion: 4+  Extension: 4+  Quadriceps contraction: good (No quad lag when cued for good contraction.)     Assessment & Plan     Assessment  Impairments: abnormal or restricted ROM, activity intolerance, impaired balance, impaired physical strength and pain with function  Assessment details: Pt is progressing quite well overall with PT as evidenced by improvements in L knee functional ROM, strength, stability, and overall activity tolerance. Pt's L foot drop is continuing to limit overall gait mechanics and functional mobility. Pt has been fitted for an AFO and will be receiving it at the beginning of the year from her MD office. Pt also has NCV test that is to be scheduled as well. In regards to ROM pt is progressing nicely overall. Only lacking ~1 deg of neutral ext but did achieve 0 deg last visit. Doing well with CKC strengthening progressions, have to modify at times due her foot drop. Improving quad strength/stability noted but continues to be mildly limited eccentrically. Pt will continue to benefit from skilled PT services to further improve functional knee/hip strength and stability and functional ROM.  Prognosis: good  Functional Limitations: walking, uncomfortable because of pain and standing  Goals  Plan Goals: STG's (all met)  1) Demonstrate independence/compliance in HEP- Met  2) Demonstrate improved L knee ext AROM to 0 deg- Met  3) Demonstrate improved L knee flex AROM to 115 deg or greater - met  4) Perform independent active SLR flex 2x10 with no quad lag present, good eccentric control noted- Met    LTG's by 1/6/19  1) Subjectively report 60% overall improvement or greater - progressing  2) Improve LEFS score to 40/80 or greater - progressing  3) Demonstrate improved L knee flex/ext MMT to 5/5 - progressing  4) Demonstrate improved L hip flex/abd MMT to 4+/5 or greater - partially met  5) Ambulate independently with SPC with good TKE noted LLE (pending AFO/L foot  "drop) - hasn't gotten AFO yet (beginning of 2020)  6) Demonstrate good L eccentric quad control with fwd step downs on 6\" step      Plan  Therapy options: will be seen for skilled physical therapy services  Planned modality interventions: cryotherapy and electrical stimulation/Russian stimulation  Planned therapy interventions: balance/weight-bearing training, flexibility, functional ROM exercises, home exercise program, joint mobilization, manual therapy, neuromuscular re-education, soft tissue mobilization, strengthening, stretching and therapeutic activities  Duration in visits: 6  Plan details: Will continue to see pt for ~2-3 more weeks to further progress knee/hip strengthening and stability. Pt should be receiving AFO at the beginning of next year which will assist tremendously in gait mechanics/mobility. Continue to progress CKC strengthening activities as able.        Visit Diagnoses:    ICD-10-CM ICD-9-CM   1. Aftercare following left knee joint replacement surgery Z47.1 V54.81    Z96.652 V43.65   2. Left knee pain, unspecified chronicity M25.562 719.46       Progress toward previous goals: Partially Met       Recommendations: Continue as planned  Timeframe: 2-3 more weeks  Prognosis to achieve goals: good    PT Signature: Cary Ramon, PT      Based upon review of the patient's progress and continued therapy plan, it is my medical opinion that Hailey Dickerson should continue physical therapy treatment at [facility]  [address] [facility] [address] [phone].    Signature: __________________________________  Rashaad Capps MD    Timed:  Manual Therapy:    6     mins  91062;  Therapeutic Exercise:    42     mins  40692;     Neuromuscular Hong:        mins  64162;    Therapeutic Activity:          mins  32442;     Gait Training:           mins  34641;     Ultrasound:          mins  38511;    Electrical Stimulation:         mins  68037 " ( );    Untimed:  Electrical Stimulation:    15     mins  61182 ( );  Mechanical Traction:         mins  85918;     Timed Treatment:   48   mins   Total Treatment:     63   mins

## 2019-12-20 ENCOUNTER — TREATMENT (OUTPATIENT)
Dept: PHYSICAL THERAPY | Facility: CLINIC | Age: 79
End: 2019-12-20

## 2019-12-20 DIAGNOSIS — M25.562 LEFT KNEE PAIN, UNSPECIFIED CHRONICITY: ICD-10-CM

## 2019-12-20 DIAGNOSIS — Z47.1 AFTERCARE FOLLOWING LEFT KNEE JOINT REPLACEMENT SURGERY: Primary | ICD-10-CM

## 2019-12-20 DIAGNOSIS — Z96.652 AFTERCARE FOLLOWING LEFT KNEE JOINT REPLACEMENT SURGERY: Primary | ICD-10-CM

## 2019-12-20 PROCEDURE — G0283 ELEC STIM OTHER THAN WOUND: HCPCS | Performed by: PHYSICAL THERAPIST

## 2019-12-20 PROCEDURE — 97110 THERAPEUTIC EXERCISES: CPT | Performed by: PHYSICAL THERAPIST

## 2019-12-20 NOTE — PROGRESS NOTES
"   Physical Therapy Daily Progress Note      Patient: Hailey Dickerson   : 1940  Referring practitioner: Rashaad Capps MD  Date of Initial Visit: Type: THERAPY  Noted: 2019  Today's Date: 2019  Patient seen for 10 sessions    Recheck due: 20  MD appt: TBD       Hailey Dickerson reports: 50% improvement        Subjective Evaluation    History of Present Illness    Subjective comment: pt states that she is doing well with her SPC. pt states that her AFO is orderd and will be her in [place] sometime.Pain  Current pain ratin           Objective       Active Range of Motion   Left Knee   Flexion: 120 degrees   Extension: 1 degrees      See Exercise, Manual, and Modality Logs for complete treatment.       Assessment & Plan     Assessment  Assessment details: Pt did well with therex today. Able to achieve 120° of flexion. Required HHA for step up and overs and was not as confident with that exercise. Pt walking well with SPC and is ready to get her brace.     Goals  Plan Goals: STG's (all met)  1) Demonstrate independence/compliance in HEP- Met  2) Demonstrate improved L knee ext AROM to 0 deg- Met  3) Demonstrate improved L knee flex AROM to 115 deg or greater - met  4) Perform independent active SLR flex 2x10 with no quad lag present, good eccentric control noted- Met    LTG's by 19  1) Subjectively report 60% overall improvement or greater - progressing  2) Improve LEFS score to 40/80 or greater - progressing  3) Demonstrate improved L knee flex/ext MMT to 5/5 - progressing  4) Demonstrate improved L hip flex/abd MMT to 4+/5 or greater - partially met  5) Ambulate independently with SPC with good TKE noted LLE (pending AFO/L foot drop) - hasn't gotten AFO yet (beginning of )  6) Demonstrate good L eccentric quad control with fwd step downs on 6\" step        Plan  Duration in visits: 6  Plan details: Try lateral step up and over        Visit Diagnoses:    ICD-10-CM ICD-9-CM   1. Aftercare " following left knee joint replacement surgery Z47.1 V54.81    Z96.652 V43.65   2. Left knee pain, unspecified chronicity M25.562 719.46       Progress per Plan of Care and Progress strengthening /stabilization /functional activity           Timed:  Manual Therapy:         mins  12835;  Therapeutic Exercise:    45     mins  05276;     Neuromuscular Hong:        mins  52182;    Therapeutic Activity:          mins  98344;     Gait Training:           mins  08515;     Ultrasound:          mins  22322;    Electrical Stimulation:         mins  60310 ( );    Untimed:  Electrical Stimulation:    15     mins  94011 ( );  Mechanical Traction:         mins  27825;     Timed Treatment:   45   mins   Total Treatment:     60   mins  Marilyn Ghosh [facility]  Physical Therapist

## 2019-12-23 ENCOUNTER — TREATMENT (OUTPATIENT)
Dept: PHYSICAL THERAPY | Facility: CLINIC | Age: 79
End: 2019-12-23

## 2019-12-23 DIAGNOSIS — Z96.652 AFTERCARE FOLLOWING LEFT KNEE JOINT REPLACEMENT SURGERY: Primary | ICD-10-CM

## 2019-12-23 DIAGNOSIS — Z47.1 AFTERCARE FOLLOWING LEFT KNEE JOINT REPLACEMENT SURGERY: Primary | ICD-10-CM

## 2019-12-23 DIAGNOSIS — M25.562 LEFT KNEE PAIN, UNSPECIFIED CHRONICITY: ICD-10-CM

## 2019-12-23 PROCEDURE — 97110 THERAPEUTIC EXERCISES: CPT | Performed by: PHYSICAL THERAPIST

## 2019-12-23 PROCEDURE — G0283 ELEC STIM OTHER THAN WOUND: HCPCS | Performed by: PHYSICAL THERAPIST

## 2019-12-23 NOTE — PROGRESS NOTES
"   Physical Therapy Daily Progress Note      Patient: Hailey Dickerson   : 1940  Referring practitioner: Rashaad Capps MD  Date of Initial Visit: Type: THERAPY  Noted: 2019  Today's Date: 2019  Patient seen for 11 sessions    Recheck due: 20  MD appt: TBD         Hailey Dickerson reports: 50% improvement        Subjective Evaluation    History of Present Illness    Subjective comment: pt states that she stumped her toe so she is a little apprehensvie now and trying to be more careful.       Objective       Ambulation     Comments   Ambulates with quad cane- drop foot on L foot     See Exercise, Manual, and Modality Logs for complete treatment.       Assessment & Plan     Assessment  Assessment details: Pt required max cues for fwd step up and overs- may do better in // bars where pt can hold on bilaterally. Pt doing well with ROM- 0-120°. More apprehensive today with gait due to stumped toe     Goals  Plan Goals: STG's (all met)  1) Demonstrate independence/compliance in HEP- Met  2) Demonstrate improved L knee ext AROM to 0 deg- Met  3) Demonstrate improved L knee flex AROM to 115 deg or greater - met  4) Perform independent active SLR flex 2x10 with no quad lag present, good eccentric control noted- Met    LTG's by 19  1) Subjectively report 60% overall improvement or greater - progressing  2) Improve LEFS score to 40/80 or greater - progressing  3) Demonstrate improved L knee flex/ext MMT to 5/5 - progressing  4) Demonstrate improved L hip flex/abd MMT to 4+/5 or greater - partially met  5) Ambulate independently with SPC with good TKE noted LLE (pending AFO/L foot drop) - hasn't gotten AFO yet (beginning of )  6) Demonstrate good L eccentric quad control with fwd step downs on 6\" step      Plan  Duration in visits: 6  Plan details: Try cybex LP2 next        Visit Diagnoses:    ICD-10-CM ICD-9-CM   1. Aftercare following left knee joint replacement surgery Z47.1 V54.81    Z96.652 " V43.65   2. Left knee pain, unspecified chronicity M25.562 719.46       Progress per Plan of Care and Progress strengthening /stabilization /functional activity           Timed:  Manual Therapy:         mins  36185;  Therapeutic Exercise:    45     mins  43406;     Neuromuscular Hong:        mins  78958;    Therapeutic Activity:          mins  60171;     Gait Training:           mins  59368;     Ultrasound:          mins  18944;    Electrical Stimulation:         mins  19639 ( );    Untimed:  Electrical Stimulation:   15      mins  27456 ( );  Mechanical Traction:         mins  71192;     Timed Treatment:   60   mins   Total Treatment:     60   mins  Marilyn Ghosh, [facility]  Physical Therapist

## 2019-12-27 ENCOUNTER — TREATMENT (OUTPATIENT)
Dept: PHYSICAL THERAPY | Facility: CLINIC | Age: 79
End: 2019-12-27

## 2019-12-27 DIAGNOSIS — Z96.652 AFTERCARE FOLLOWING LEFT KNEE JOINT REPLACEMENT SURGERY: Primary | ICD-10-CM

## 2019-12-27 DIAGNOSIS — Z47.1 AFTERCARE FOLLOWING LEFT KNEE JOINT REPLACEMENT SURGERY: Primary | ICD-10-CM

## 2019-12-27 DIAGNOSIS — M25.562 LEFT KNEE PAIN, UNSPECIFIED CHRONICITY: ICD-10-CM

## 2019-12-27 PROCEDURE — G0283 ELEC STIM OTHER THAN WOUND: HCPCS | Performed by: PHYSICAL THERAPIST

## 2019-12-27 PROCEDURE — 97110 THERAPEUTIC EXERCISES: CPT | Performed by: PHYSICAL THERAPIST

## 2019-12-27 NOTE — PROGRESS NOTES
"   Physical Therapy Daily Progress Note      Patient: Hailey Dickerson   : 1940  Referring practitioner: Rashaad Capps MD  Date of Initial Visit: Type: THERAPY  Noted: 2019  Today's Date: 2019  Patient seen for 12 sessions    Recheck due: 20  MD appt: TBD         Hailey Dickerson reports: 60% improvement        Subjective Evaluation    History of Present Illness    Subjective comment: pt states that she goes next week to get a nerve conduction test done on her leg/footPain  Current pain ratin           Objective   See Exercise, Manual, and Modality Logs for complete treatment.       Assessment & Plan     Assessment  Assessment details: Pt becoming more confident with step up and overs and only requires use of HR- does require verbal and demo at times. Pt did well with intro into cybex with LP2- did need help getting L leg onto the sled. Pt able to maintain 0-120° of AROM    Goals  Plan Goals: STG's (all met)  1) Demonstrate independence/compliance in HEP- Met  2) Demonstrate improved L knee ext AROM to 0 deg- Met  3) Demonstrate improved L knee flex AROM to 115 deg or greater - met  4) Perform independent active SLR flex 2x10 with no quad lag present, good eccentric control noted- Met    LTG's by 19  1) Subjectively report 60% overall improvement or greater - progressing  2) Improve LEFS score to 40/80 or greater - progressing  3) Demonstrate improved L knee flex/ext MMT to 5/5 - progressing  4) Demonstrate improved L hip flex/abd MMT to 4+/5 or greater - partially met  5) Ambulate independently with SPC with good TKE noted LLE (pending AFO/L foot drop) - hasn't gotten AFO yet (beginning of )  6) Demonstrate good L eccentric quad control with fwd step downs on 6\" step        Plan  Duration in visits: 6  Plan details: Cybex LP1 next          Visit Diagnoses:    ICD-10-CM ICD-9-CM   1. Aftercare following left knee joint replacement surgery Z47.1 V54.81    Z96.652 V43.65   2. Left " knee pain, unspecified chronicity M25.562 719.46       Progress per Plan of Care and Progress strengthening /stabilization /functional activity           Timed:  Manual Therapy:         mins  99205;  Therapeutic Exercise:    45     mins  21555;     Neuromuscular Hong:        mins  71596;    Therapeutic Activity:          mins  69364;     Gait Training:           mins  82030;     Ultrasound:          mins  31373;    Electrical Stimulation:         mins  49902 ( );    Untimed:  Electrical Stimulation:    15     mins  77759 ( );  Mechanical Traction:         mins  30758;     Timed Treatment:   45   mins   Total Treatment:     60   mins  Marilyn Ghosh [facility]  Physical Therapist

## 2019-12-30 ENCOUNTER — TREATMENT (OUTPATIENT)
Dept: PHYSICAL THERAPY | Facility: CLINIC | Age: 79
End: 2019-12-30

## 2019-12-30 DIAGNOSIS — M25.562 LEFT KNEE PAIN, UNSPECIFIED CHRONICITY: ICD-10-CM

## 2019-12-30 DIAGNOSIS — Z96.652 AFTERCARE FOLLOWING LEFT KNEE JOINT REPLACEMENT SURGERY: Primary | ICD-10-CM

## 2019-12-30 DIAGNOSIS — Z47.1 AFTERCARE FOLLOWING LEFT KNEE JOINT REPLACEMENT SURGERY: Primary | ICD-10-CM

## 2019-12-30 PROCEDURE — 97110 THERAPEUTIC EXERCISES: CPT | Performed by: PHYSICAL THERAPIST

## 2019-12-30 PROCEDURE — G0283 ELEC STIM OTHER THAN WOUND: HCPCS | Performed by: PHYSICAL THERAPIST

## 2019-12-30 NOTE — PROGRESS NOTES
"   Physical Therapy Daily Progress Note      Patient: Hailey Dickerson   : 1940  Referring practitioner: Rashaad Capps MD  Date of Initial Visit: Type: THERAPY  Noted: 2019  Today's Date: 2019  Patient seen for 13 sessions    Recheck due: 20  MD appt: TBD       Hailey Dickerson reports: 60% improvement        Subjective Evaluation    History of Present Illness    Subjective comment: pt states that she is doing well. Only has back pain- no pain.Pain  Current pain ratin           Objective       Ambulation     Comments   Ambulates with quad cane- drop foot     See Exercise, Manual, and Modality Logs for complete treatment.       Assessment & Plan     Assessment  Assessment details: Pt tolerated cybex equipment well today- added cybex LP1 and cybex ham curl. Pt improving with ecc control with fwd step up and overs. Continues to have good AROM 0-121°.     Goals  Plan Goals: STG's (all met)  1) Demonstrate independence/compliance in HEP- Met  2) Demonstrate improved L knee ext AROM to 0 deg- Met  3) Demonstrate improved L knee flex AROM to 115 deg or greater - met  4) Perform independent active SLR flex 2x10 with no quad lag present, good eccentric control noted- Met    LTG's by 19  1) Subjectively report 60% overall improvement or greater - progressing  2) Improve LEFS score to 40/80 or greater - progressing  3) Demonstrate improved L knee flex/ext MMT to 5/5 - progressing  4) Demonstrate improved L hip flex/abd MMT to 4+/5 or greater - partially met  5) Ambulate independently with SPC with good TKE noted LLE (pending AFO/L foot drop) - hasn't gotten AFO yet (beginning of )  6) Demonstrate good L eccentric quad control with fwd step downs on 6\" step      Plan  Duration in visits: 6  Plan details: Cont CKC progression. Cont cybex.        Visit Diagnoses:    ICD-10-CM ICD-9-CM   1. Aftercare following left knee joint replacement surgery Z47.1 V54.81    Z96.652 V43.65   2. Left knee pain, " unspecified chronicity M25.562 719.46       Progress per Plan of Care and Progress strengthening /stabilization /functional activity           Timed:  Manual Therapy:         mins  31763;  Therapeutic Exercise:    45     mins  77618;     Neuromuscular Hong:        mins  61618;    Therapeutic Activity:          mins  79501;     Gait Training:           mins  88321;     Ultrasound:          mins  49953;    Electrical Stimulation:         mins  46582 ( );    Untimed:  Electrical Stimulation:    15     mins  44553 ( );  Mechanical Traction:         mins  70935;     Timed Treatment:   45   mins   Total Treatment:     60   mins  Marilyn Ghosh [facility]  Physical Therapist

## 2020-01-02 ENCOUNTER — TREATMENT (OUTPATIENT)
Dept: PHYSICAL THERAPY | Facility: CLINIC | Age: 80
End: 2020-01-02

## 2020-01-02 DIAGNOSIS — M25.562 LEFT KNEE PAIN, UNSPECIFIED CHRONICITY: ICD-10-CM

## 2020-01-02 DIAGNOSIS — Z96.652 AFTERCARE FOLLOWING LEFT KNEE JOINT REPLACEMENT SURGERY: Primary | ICD-10-CM

## 2020-01-02 DIAGNOSIS — Z47.1 AFTERCARE FOLLOWING LEFT KNEE JOINT REPLACEMENT SURGERY: Primary | ICD-10-CM

## 2020-01-02 PROCEDURE — G0283 ELEC STIM OTHER THAN WOUND: HCPCS | Performed by: PHYSICAL THERAPIST

## 2020-01-02 PROCEDURE — 97110 THERAPEUTIC EXERCISES: CPT | Performed by: PHYSICAL THERAPIST

## 2020-01-02 PROCEDURE — 97530 THERAPEUTIC ACTIVITIES: CPT | Performed by: PHYSICAL THERAPIST

## 2020-01-02 PROCEDURE — 97112 NEUROMUSCULAR REEDUCATION: CPT | Performed by: PHYSICAL THERAPIST

## 2020-01-02 NOTE — PROGRESS NOTES
Physical Therapy Daily Progress Note      Patient: Hailey Dickerson   : 1940  Referring practitioner: Rashaad Capps MD  Date of Initial Visit: Type: THERAPY  Noted: 2019  Today's Date: 2020  Patient seen for 14 sessions  Recheck due: 20  MD appt: 2020       Hailey Dickerson reports: 60%      Subjective Evaluation    History of Present Illness    Subjective comment: Pt states that the back of her leg is hurting some and pretty sore; MD has AFO ordered and shld get soon; nerve study tomorrowPain  Current pain ratin           Objective       Ambulation     Comments   Decr nirmala; absent heelstrike/DF; sm base QC     See Exercise, Manual, and Modality Logs for complete treatment.       Assessment & Plan     Assessment  Assessment details: Pt treatment today focused on proprio/balance and CKC activities to improve functional strength. She is pending a nerve study tomorrow and is pending receipt of AFO. Good effort and participation. Cues for proprio/balance and step up and over as well as close SBA/CGA. Deferred ham curl today due to pt c/o's incr pn and soreness back of leg. Just did tband today.    Goals  Plan Goals: STG's (all met)  1) Demonstrate independence/compliance in HEP- Met  2) Demonstrate improved L knee ext AROM to 0 deg- Met  3) Demonstrate improved L knee flex AROM to 115 deg or greater - met  4) Perform independent active SLR flex 2x10 with no quad lag present, good eccentric control noted- Met    LTG's by 19  1) Subjectively report 60% overall improvement or greater - progressing  2) Improve LEFS score to 40/80 or greater - progressing  3) Demonstrate improved L knee flex/ext MMT to 5/5 - progressing  4) Demonstrate improved L hip flex/abd MMT to 4+/5 or greater - partially met  5) Ambulate independently with SPC with good TKE noted LLE (pending AFO/L foot drop) - hasn't gotten AFO yet (beginning of )  6) Demonstrate good L eccentric quad control with fwd step  "downs on 6\" step      Plan  Duration in visits: 6  Plan details: Cont proprio/bal; incr step ht step up as anthony; incr to 70 LP2  This visit 1/6        Visit Diagnoses:    ICD-10-CM ICD-9-CM   1. Aftercare following left knee joint replacement surgery Z47.1 V54.81    Z96.652 V43.65   2. Left knee pain, unspecified chronicity M25.562 719.46       Progress per Plan of Care and Progress strengthening /stabilization /functional activity           Timed:  Manual Therapy:         mins  83519;  Therapeutic Exercise:    15     mins  58407;     Neuromuscular Hong:    14    mins  56173;    Therapeutic Activity:     15     mins  99428;     Gait Training:           mins  61585;     Ultrasound:          mins  58027;    Electrical Stimulation:         mins  96606 ( );    Untimed:  Electrical Stimulation:    15     mins  82763 ( );  Mechanical Traction:         mins  43356;     Timed Treatment:   44   mins   Total Treatment:     59   mins  Esperanza Garcia PTA  Physical Therapist                  "

## 2020-01-03 ENCOUNTER — HOSPITAL ENCOUNTER (OUTPATIENT)
Dept: NEUROLOGY | Age: 80
Discharge: HOME | End: 2020-01-03
Payer: MEDICARE

## 2020-01-03 PROCEDURE — 95886 MUSC TEST DONE W/N TEST COMP: CPT | Performed by: PSYCHIATRY & NEUROLOGY

## 2020-01-03 PROCEDURE — 95908 NRV CNDJ TST 3-4 STUDIES: CPT

## 2020-01-03 PROCEDURE — 95886 MUSC TEST DONE W/N TEST COMP: CPT

## 2020-01-03 PROCEDURE — 95908 NRV CNDJ TST 3-4 STUDIES: CPT | Performed by: PSYCHIATRY & NEUROLOGY

## 2020-01-07 ENCOUNTER — TREATMENT (OUTPATIENT)
Dept: PHYSICAL THERAPY | Facility: CLINIC | Age: 80
End: 2020-01-07

## 2020-01-07 DIAGNOSIS — M25.562 LEFT KNEE PAIN, UNSPECIFIED CHRONICITY: ICD-10-CM

## 2020-01-07 DIAGNOSIS — Z47.1 AFTERCARE FOLLOWING LEFT KNEE JOINT REPLACEMENT SURGERY: Primary | ICD-10-CM

## 2020-01-07 DIAGNOSIS — Z96.652 AFTERCARE FOLLOWING LEFT KNEE JOINT REPLACEMENT SURGERY: Primary | ICD-10-CM

## 2020-01-07 PROCEDURE — G0283 ELEC STIM OTHER THAN WOUND: HCPCS | Performed by: PHYSICAL THERAPIST

## 2020-01-07 PROCEDURE — 97110 THERAPEUTIC EXERCISES: CPT | Performed by: PHYSICAL THERAPIST

## 2020-01-07 NOTE — PROGRESS NOTES
"   Physical Therapy Daily Progress Note      Patient: Hailey Dickerson   : 1940  Referring practitioner: Rashaad Capps MD  Date of Initial Visit: Type: THERAPY  Noted: 2019  Today's Date: 2020  Patient seen for 15 sessions    Recheck due: 20  MD appt: 2020       Hailey Dickerson reports: 60% iimprovement        Subjective Evaluation    History of Present Illness    Subjective comment: pt states that she had her nerve conduction study done and didnt get a lot of answers. MD states it could take a year to come backPain  Current pain ratin           Objective       Ambulation     Comments   Ambulates with quad cane. Drop foot on RLE.     See Exercise, Manual, and Modality Logs for complete treatment.       Assessment & Plan     Assessment  Assessment details: Pt did well with treatment today. Requires intermittent reminders and cues for form but doing well overall. Pt able to navigate recip stairs using bilateral UE assist. Pt requires assist to get RLE onto sled of leg press.    Goals  Plan Goals: STG's (all met)  1) Demonstrate independence/compliance in HEP- Met  2) Demonstrate improved L knee ext AROM to 0 deg- Met  3) Demonstrate improved L knee flex AROM to 115 deg or greater - met  4) Perform independent active SLR flex 2x10 with no quad lag present, good eccentric control noted- Met    LTG's by 19  1) Subjectively report 60% overall improvement or greater - progressing  2) Improve LEFS score to 40/80 or greater - progressing  3) Demonstrate improved L knee flex/ext MMT to 5/5 - progressing  4) Demonstrate improved L hip flex/abd MMT to 4+/5 or greater - partially met  5) Ambulate independently with SPC with good TKE noted LLE (pending AFO/L foot drop) - hasn't gotten AFO yet (beginning of )  6) Demonstrate good L eccentric quad control with fwd step downs on 6\" step      Plan  Duration in visits: 6  Plan details: 3 more visits and then D/C to I management. Begin to finalize " HEP. Educate on cybex set up and PROII.        Visit Diagnoses:    ICD-10-CM ICD-9-CM   1. Aftercare following left knee joint replacement surgery Z47.1 V54.81    Z96.652 V43.65   2. Left knee pain, unspecified chronicity M25.562 719.46       Progress per Plan of Care and Progress strengthening /stabilization /functional activity           Timed:  Manual Therapy:         mins  88353;  Therapeutic Exercise:    55     mins  62870;     Neuromuscular Hong:        mins  30911;    Therapeutic Activity:          mins  61978;     Gait Training:           mins  49114;     Ultrasound:          mins  74967;    Electrical Stimulation:         mins  39399 ( );    Untimed:  Electrical Stimulation:    15     mins  75498 ( );  Mechanical Traction:         mins  65888;     Timed Treatment:   55   mins   Total Treatment:     70   mins  Marilyn Ghosh [facility]  Physical Therapist

## 2020-01-08 ENCOUNTER — READMISSION MANAGEMENT (OUTPATIENT)
Dept: CALL CENTER | Facility: HOSPITAL | Age: 80
End: 2020-01-08

## 2020-01-08 NOTE — OUTREACH NOTE
Total Joint Month 2 Survey      Responses   Facility patient discharged from?  [facility]   Does the patient have one of the following disease processes/diagnoses(primary or secondary)?  Total Joint Replacement   Joint surgery performed?  Knee   Month 2 attempt successful?  Yes   Call start time  1127   Call end time  1131   Has the patient been back in either the hospital or Emergency Department since discharge?  No   Discharge diagnosis  total left knee   Is the patient taking all medications as directed (includes completed medication regime)?  Yes   Has the patient kept scheduled appointments due by today?  Yes   Comments  Has had nerve conduction study for foot issue post surgery   Is the patient still attending therapy sessions(either in the home or as an outpatient)?  Yes   Has the patient fallen since discharge?  No   Comments  Nerve damage persists, walking with cane   What is the patient's perception of their functional status since discharge?  Improving   Is the patient/caregiver able to teach back the hierarchy of who to call/visit for symptoms/problems? PCP, Specialist, Home health nurse, Urgent Care, ED, 911  Yes   Month 2 Call Completed?  Yes          Conchita Jaffe RN

## 2020-01-09 ENCOUNTER — TREATMENT (OUTPATIENT)
Dept: PHYSICAL THERAPY | Facility: CLINIC | Age: 80
End: 2020-01-09

## 2020-01-09 DIAGNOSIS — Z47.1 AFTERCARE FOLLOWING LEFT KNEE JOINT REPLACEMENT SURGERY: Primary | ICD-10-CM

## 2020-01-09 DIAGNOSIS — M25.562 LEFT KNEE PAIN, UNSPECIFIED CHRONICITY: ICD-10-CM

## 2020-01-09 DIAGNOSIS — Z96.652 AFTERCARE FOLLOWING LEFT KNEE JOINT REPLACEMENT SURGERY: Primary | ICD-10-CM

## 2020-01-09 PROCEDURE — 97110 THERAPEUTIC EXERCISES: CPT | Performed by: PHYSICAL THERAPIST

## 2020-01-09 PROCEDURE — 97112 NEUROMUSCULAR REEDUCATION: CPT | Performed by: PHYSICAL THERAPIST

## 2020-01-09 PROCEDURE — G0283 ELEC STIM OTHER THAN WOUND: HCPCS | Performed by: PHYSICAL THERAPIST

## 2020-01-09 PROCEDURE — 97530 THERAPEUTIC ACTIVITIES: CPT | Performed by: PHYSICAL THERAPIST

## 2020-01-09 NOTE — PROGRESS NOTES
"   Physical Therapy Daily Progress Note      Patient: Hailey Dickerson   : 1940  Referring practitioner: Rashaad Capps MD  Date of Initial Visit: Type: THERAPY  Noted: 2019  Today's Date: 2020  Patient seen for 16 sessions  Recheck due: 20  MD appt: 2020       Hailey Dickerson reports: 60%  Subjective     Objective       Active Range of Motion   Left Knee   Flexion: 120 degrees   Extension: 2 degrees      See Exercise, Manual, and Modality Logs for complete treatment.       Assessment & Plan     Assessment  Assessment details: Pt likely to need assist for LP from fitness staff; cont with foot drop and not received AFO yet. Therefore her gait nirmala quite slow. Pt repts likely to do fitness.    Goals  Plan Goals: STG's (all met)  1) Demonstrate independence/compliance in HEP- Met  2) Demonstrate improved L knee ext AROM to 0 deg- Met  3) Demonstrate improved L knee flex AROM to 115 deg or greater - met  4) Perform independent active SLR flex 2x10 with no quad lag present, good eccentric control noted- Met    LTG's by 19  1) Subjectively report 60% overall improvement or greater - progressing  2) Improve LEFS score to 40/80 or greater - progressing  3) Demonstrate improved L knee flex/ext MMT to 5/5 - progressing  4) Demonstrate improved L hip flex/abd MMT to 4+/5 or greater - partially met  5) Ambulate independently with SPC with good TKE noted LLE (pending AFO/L foot drop) - hasn't gotten AFO yet (beginning of )  6) Demonstrate good L eccentric quad control with fwd step downs on 6\" step      Plan  Duration in visits: 6  Plan details: Ensure I HEP nxt wk and DC nxt wk per POC        Visit Diagnoses:    ICD-10-CM ICD-9-CM   1. Aftercare following left knee joint replacement surgery Z47.1 V54.81    Z96.652 V43.65   2. Left knee pain, unspecified chronicity M25.562 719.46       Progress per Plan of Care, Progress strengthening /stabilization /functional activity and Anticipate DC next " Visit           Timed:  Manual Therapy:         mins  15983;  Therapeutic Exercise:    25     mins  97409;     Neuromuscular Hong:    20    mins  66295;    Therapeutic Activity:          mins  21152;     Gait Training:           mins  63422;     Ultrasound:          mins  35459;    Electrical Stimulation:         mins  96313 ( );    Untimed:  Electrical Stimulation:    15     mins  55536 ( );  Mechanical Traction:         mins  86388;     Timed Treatment:   45   mins   Total Treatment:     60   mins  Esperanza Garcia PTA  Physical Therapist

## 2020-01-16 ENCOUNTER — TREATMENT (OUTPATIENT)
Dept: PHYSICAL THERAPY | Facility: CLINIC | Age: 80
End: 2020-01-16

## 2020-01-16 DIAGNOSIS — Z47.1 AFTERCARE FOLLOWING LEFT KNEE JOINT REPLACEMENT SURGERY: Primary | ICD-10-CM

## 2020-01-16 DIAGNOSIS — Z96.652 AFTERCARE FOLLOWING LEFT KNEE JOINT REPLACEMENT SURGERY: Primary | ICD-10-CM

## 2020-01-16 DIAGNOSIS — M25.562 LEFT KNEE PAIN, UNSPECIFIED CHRONICITY: ICD-10-CM

## 2020-01-16 PROCEDURE — 97110 THERAPEUTIC EXERCISES: CPT | Performed by: PHYSICAL THERAPIST

## 2020-01-16 PROCEDURE — G0283 ELEC STIM OTHER THAN WOUND: HCPCS | Performed by: PHYSICAL THERAPIST

## 2020-01-16 NOTE — PROGRESS NOTES
"   Physical Therapy Daily Progress Note/ Discharge      Patient: Hailey Dickerson   : 1940  Referring practitioner: Rashaad Capps MD  Date of Initial Visit: Type: THERAPY  Noted: 2019  Today's Date: 2020  Patient seen for 17 sessions    Recheck due: 20  MD appt: 2020     Hailey Dickerson reports: 100% improved        Subjective Evaluation    History of Present Illness    Subjective comment: pt states that her knee is 1005 better, foot still bothers herPain  Current pain ratin           Objective       Ambulation     Comments   Ambulates with quad cane, L foot drop     See Exercise, Manual, and Modality Logs for complete treatment.       Assessment & Plan     Assessment  Assessment details: Spent most of treatment on education in fitness to continue on her own. Pt doing well with ROM exercises and educated to continue.     Goals  Plan Goals: STG's (all met)  1) Demonstrate independence/compliance in HEP- Met  2) Demonstrate improved L knee ext AROM to 0 deg- Met  3) Demonstrate improved L knee flex AROM to 115 deg or greater - met  4) Perform independent active SLR flex 2x10 with no quad lag present, good eccentric control noted- Met    LTG's by 19  1) Subjectively report 60% overall improvement or greater -Met  2) Improve LEFS score to 40/80 or greater - met  3) Demonstrate improved L knee flex/ext MMT to 5/5 - progressing  4) Demonstrate improved L hip flex/abd MMT to 4+/5 or greater - partially met  5) Ambulate independently with SPC with good TKE noted LLE (pending AFO/L foot drop) - hasn't gotten AFO yet (beginning of )  6) Demonstrate good L eccentric quad control with fwd step downs on 6\" step: partially met          Plan  Duration in visits: 6  Plan details: D/C to I management with HEP established. Has silver sneakers to continue fitness         Visit Diagnoses:    ICD-10-CM ICD-9-CM   1. Aftercare following left knee joint replacement surgery Z47.1 V54.81    Z96.652 " V43.65   2. Left knee pain, unspecified chronicity M25.562 719.46       Progress per Plan of Care and Progress strengthening /stabilization /functional activity    Discharge to  management       Timed:  Manual Therapy:         mins  00183;  Therapeutic Exercise:    45     mins  51275;     Neuromuscular Hong:        mins  23003;    Therapeutic Activity:          mins  75262;     Gait Training:           mins  74627;     Ultrasound:          mins  93679;    Electrical Stimulation:         mins  23943 ( );    Untimed:  Electrical Stimulation:    15     mins  65341 ( );  Mechanical Traction:         mins  04617;     Timed Treatment:   45   mins   Total Treatment:     60   mins  Marilyn Ghosh, [facility]  Physical Therapist

## 2020-02-10 ENCOUNTER — READMISSION MANAGEMENT (OUTPATIENT)
Dept: CALL CENTER | Facility: HOSPITAL | Age: 80
End: 2020-02-10

## 2020-02-10 NOTE — OUTREACH NOTE
Total Joint Month 3 Survey      Responses   Facility patient discharged from?  [place]   Does the patient have one of the following disease processes/diagnoses(primary or secondary)?  Total Joint Replacement   Joint surgery performed?  Knee   Month 3 attempt successful?  Yes   Call start time  1345   Call end time  1348   Has the patient been back in either the hospital or Emergency Department since discharge?  No   Discharge diagnosis  total left knee   Is the patient taking all medications as directed (includes completed medication regime)?  Yes   Is the patient able to teach back alternate methods of pain control?  Correct alignment, Reposition   Has the patient kept scheduled appointments due by today?  Yes   Is the patient still receiving Home Health Services?  N/A   Is the patient still attending therapy sessions(either in the home or as an outpatient)?  Yes   Has the patient fallen since discharge?  No   If the patient has fallen, were there any injuries?  No   What is the patient's perception of their functional status since discharge?  Improving   Is the patient able to teach back signs and symptoms of infection?  Temp >100.4 for 24h or longer, Incisional drainage, Blisters around incision, Increased swelling or redness around incision (not associated with surgical edema), Severe discomfort or pain, Changes in mobility, Shortness of breath or chest pain   Is the patient/caregiver able to teach back the hierarchy of who to call/visit for symptoms/problems? PCP, Specialist, Home health nurse, Urgent Care, ED, 911  Yes   Graduated  Yes          Moi Justin RN

## 2020-03-17 ENCOUNTER — APPOINTMENT (OUTPATIENT)
Dept: PREADMISSION TESTING | Facility: HOSPITAL | Age: 80
End: 2020-03-17

## 2020-03-30 RX ORDER — SIMVASTATIN 20 MG
TABLET ORAL
Qty: 90 TABLET | Refills: 0 | Status: SHIPPED | OUTPATIENT
Start: 2020-03-30 | End: 2020-06-29

## 2020-04-20 RX ORDER — CITALOPRAM 20 MG/1
TABLET ORAL
Qty: 90 TABLET | Refills: 0 | Status: SHIPPED | OUTPATIENT
Start: 2020-04-20 | End: 2020-07-27

## 2020-04-27 RX ORDER — INDAPAMIDE 2.5 MG/1
TABLET, FILM COATED ORAL
Qty: 36 TABLET | Refills: 0 | Status: SHIPPED | OUTPATIENT
Start: 2020-04-27 | End: 2020-05-11

## 2020-05-04 PROBLEM — E78.2 MIXED HYPERLIPIDEMIA: Status: ACTIVE | Noted: 2020-05-04

## 2020-05-04 NOTE — PROGRESS NOTES
The ABCs of the Annual Wellness Visit  Subsequent Medicare Wellness Visit    Chief Complaint   Patient presents with   • Medicare Wellness-subsequent     fasting       Subjective   History of Present Illness:  Hailey Dickerson is a 79 y.o. female who presents for a Subsequent Medicare Wellness Visit.    HEALTH RISK ASSESSMENT    Recent Hospitalizations:  No hospitalization(s) within the last year.    Current Medical Providers:  Patient Care Team:  Titi Cotton MD as PCP - General (Family Medicine)    Smoking Status:  Social History     Tobacco Use   Smoking Status Never Smoker   Smokeless Tobacco Never Used       Alcohol Consumption:  Social History     Substance and Sexual Activity   Alcohol Use No       Depression Screen:   PHQ-2/PHQ-9 Depression Screening 5/5/2020   Little interest or pleasure in doing things 0   Feeling down, depressed, or hopeless 0   Trouble falling or staying asleep, or sleeping too much -   Feeling tired or having little energy -   Poor appetite or overeating -   Feeling bad about yourself - or that you are a failure or have let yourself or your family down -   Trouble concentrating on things, such as reading the newspaper or watching television -   Moving or speaking so slowly that other people could have noticed. Or the opposite - being so fidgety or restless that you have been moving around a lot more than usual -   Thoughts that you would be better off dead, or of hurting yourself in some way -   Total Score 0   If you checked off any problems, how difficult have these problems made it for you to do your work, take care of things at home, or get along with other people? -       Fall Risk Screen:  STEADI Fall Risk Assessment was completed, and patient is at LOW risk for falls.Assessment completed on:5/5/2020    Health Habits and Functional and Cognitive Screening:  Functional & Cognitive Status 5/5/2020   Do you have difficulty preparing food and eating? No   Do you have  difficulty bathing yourself, getting dressed or grooming yourself? No   Do you have difficulty using the toilet? No   Do you have difficulty moving around from place to place? No   Do you have trouble with steps or getting out of a bed or a chair? No   Current Diet Well Balanced Diet   Dental Exam Not up to date   Eye Exam Not up to date   Exercise (times per week) 2 times per week   Current Exercise Activities Include Stationary Bicycling/Spin Class   Do you need help using the phone?  No   Are you deaf or do you have serious difficulty hearing?  No   Do you need help with transportation? No   Do you need help shopping? No   Do you need help preparing meals?  No   Do you need help with housework?  No   Do you need help with laundry? No   Do you need help taking your medications? No   Do you need help managing money? No   Do you ever drive or ride in a car without wearing a seat belt? No   Have you felt unusual stress, anger or loneliness in the last month? No   Who do you live with? Spouse   If you need help, do you have trouble finding someone available to you? No   Have you been bothered in the last four weeks by sexual problems? No   Do you have difficulty concentrating, remembering or making decisions? Yes         Does the patient have evidence of cognitive impairment? No    Asprin use counseling:Taking ASA appropriately as indicated    Age-appropriate Screening Schedule:  Refer to the list below for future screening recommendations based on patient's age, sex and/or medical conditions. Orders for these recommended tests are listed in the plan section. The patient has been provided with a written plan.    Health Maintenance   Topic Date Due   • INFLUENZA VACCINE  08/01/2020   • LIPID PANEL  09/26/2020   • TDAP/TD VACCINES (2 - Td) 12/18/2022   • COLONOSCOPY  12/04/2023   • ZOSTER VACCINE  Discontinued          The following portions of the patient's history were reviewed and updated as appropriate: allergies,  current medications, past family history, past medical history, past social history, past surgical history and problem list.    Outpatient Medications Prior to Visit   Medication Sig Dispense Refill   • alendronate (FOSAMAX) 70 MG tablet Take 1 tablet by mouth Every 7 (Seven) Days. 12 tablet 3   • citalopram (CeleXA) 20 MG tablet Take 1 tablet by mouth once daily 90 tablet 0   • Cranberry 125 MG tablet Take 1 tablet by mouth Daily.     • famotidine (PEPCID) 40 MG tablet Take 1 tablet by mouth every night at bedtime. 90 tablet 3   • indapamide (LOZOL) 2.5 MG tablet TAKE 1 TABLET BY MOUTH ONCE DAILY ON MONDAY, WEDNESDAY AND FRIDAY 36 tablet 0   • lisinopril (PRINIVIL,ZESTRIL) 10 MG tablet Take 10 mg by mouth Daily.     • Multiple Vitamins-Minerals (MULTIVITAMIN WITH MINERALS) tablet tablet Take 1 tablet by mouth Daily.     • simvastatin (ZOCOR) 20 MG tablet TAKE 1 TABLET BY MOUTH ONCE DAILY IN THE EVENING 90 tablet 0   • ondansetron (ZOFRAN) 4 MG tablet Take 1 tablet by mouth Every 8 (Eight) Hours As Needed for Nausea or Vomiting. 30 tablet 0   • aspirin  MG EC tablet Take 1 tablet by mouth Daily. 21 tablet 0   • butalbital-acetaminophen-caffeine (FIORICET, ESGIC) -40 MG per tablet One every 6 hours when necessary headache (Patient taking differently: Take 1 tablet by mouth Every 6 (Six) Hours As Needed for Headache or Migraine.) 10 tablet 2     No facility-administered medications prior to visit.        Patient Active Problem List   Diagnosis   • Essential hypertension   • Anxiety   • GERD (gastroesophageal reflux disease)   • Primary osteoarthritis of left knee   • Osteoarthritis of left knee   • Mixed hyperlipidemia       Advanced Care Planning:  ACP discussion was held with the patient during this visit. Patient does not have an advance directive, information provided.    Review of Systems   Respiratory: Negative for shortness of breath.    Cardiovascular: Negative for chest pain.   Gastrointestinal:  "Negative for constipation and diarrhea.   Genitourinary: Negative for dysuria and pelvic pain.   Neurological: Negative for dizziness and light-headedness.       Compared to one year ago, the patient feels her physical health is the same.  Compared to one year ago, the patient feels her mental health is the same.    Reviewed chart for potential of high risk medication in the elderly: yes  Reviewed chart for potential of harmful drug interactions in the elderly:yes    Objective         Vitals:    05/05/20 0859   BP: 131/79   BP Location: Left arm   Patient Position: Sitting   Cuff Size: Adult   Pulse: 75   Temp: 98 °F (36.7 °C)   TempSrc: Temporal   SpO2: 99%   Weight: 85.6 kg (188 lb 12.8 oz)   Height: 160 cm (63\")   PainSc: 0-No pain       Body mass index is 33.44 kg/m².  Discussed the patient's BMI with her. The BMI is above average; BMI management plan is completed.    Physical Exam   Constitutional: She is oriented to person, place, and time. Vital signs are normal. She appears well-developed and well-nourished.   HENT:   Right Ear: Tympanic membrane, external ear and ear canal normal.   Left Ear: Tympanic membrane, external ear and ear canal normal.   Mouth/Throat: Oropharynx is clear and moist and mucous membranes are normal.   Cardiovascular: Normal rate, regular rhythm and normal heart sounds.   Pulmonary/Chest: Effort normal and breath sounds normal. Right breast exhibits no inverted nipple, no mass, no nipple discharge and no skin change. Left breast exhibits no inverted nipple, no mass, no nipple discharge and no skin change.   Breast exam performed. No masses noted.    Abdominal: Soft. Bowel sounds are normal. She exhibits no distension. There is no tenderness.   Genitourinary: Vagina normal. Pelvic exam was performed with patient supine. There is no rash, tenderness, lesion or injury on the right labia. There is no rash, tenderness, lesion or injury on the left labia. Cervix exhibits no motion " tenderness and no discharge. No erythema or tenderness in the vagina.   Neurological: She is alert and oriented to person, place, and time.   Psychiatric: She has a normal mood and affect. Her behavior is normal.   Vitals reviewed.            Assessment/Plan   Medicare Risks and Personalized Health Plan  CMS Preventative Services Quick Reference  Advance Directive Discussion  Breast Cancer/Mammogram Screening  Colon Cancer Screening  Obesity/Overweight     The above risks/problems have been discussed with the patient.  Pertinent information has been shared with the patient in the After Visit Summary.  Follow up plans and orders are seen below in the Assessment/Plan Section.    Diagnoses and all orders for this visit:    1. Essential hypertension (Primary)  -     Comprehensive Metabolic Panel  -     POC Urinalysis Dipstick, Multipro    2. Anxiety  -     TSH  -     T4, free  -     POC Urinalysis Dipstick, Multipro    3. Gastroesophageal reflux disease without esophagitis  -     CBC & Differential  -     Comprehensive Metabolic Panel    4. Mixed hyperlipidemia  -     Lipid Panel    5. Breast cancer screening  -     Mammo Screening Bilateral With CAD; Future    6. Fatigue, unspecified type  -     TSH  -     T4, free  -     POC Urinalysis Dipstick, Multipro    7. Other migraine without status migrainosus, not intractable  -     butalbital-acetaminophen-caffeine (FIORICET, ESGIC) -40 MG per tablet; One every 6 hours when necessary headache  Dispense: 10 tablet; Refill: 0    8. Encounter for screening mammogram for malignant neoplasm of breast   -     Mammo Screening Bilateral With CAD; Future    Other orders  -     aspirin 81 MG EC tablet; Take 1 tablet by mouth Daily.  Dispense: 30 tablet; Refill: 0      Patient was masked upon entering facility.  I wore N95 mask, with face shield, gown, and gloves.  MA wore N95 mask with face shield, gown and gloves.        Follow Up:  Return in about 6 months (around 11/5/2020).      An After Visit Summary and PPPS were given to the patient.     Chely Miranda, EUGENE 05/05/2020

## 2020-05-04 NOTE — PATIENT INSTRUCTIONS
Medicare Wellness  Personal Prevention Plan of Service     Date of Office Visit:  2020  Encounter Provider:  EUGENE Hameed  Place of Service:  [facility]  Patient Name: Hailey Dickerson  :  1940    As part of the Medicare Wellness portion of your visit today, we are providing you with this personalized preventive plan of services (PPPS). This plan is based upon recommendations of the [place] Preventive Services Task Force (USPSTF) and the Advisory Committee on Immunization Practices (ACIP).    This lists the preventive care services that should be considered, and provides dates of when you are due. Items listed as completed are up-to-date and do not require any further intervention.    Health Maintenance   Topic Date Due   • MEDICARE ANNUAL WELLNESS  2020   • INFLUENZA VACCINE  2020   • LIPID PANEL  2020   • TDAP/TD VACCINES (2 - Td) 2022   • COLONOSCOPY  2023   • Pneumococcal Vaccine Once at 65 Years Old  Completed   • ZOSTER VACCINE  Discontinued       No orders of the defined types were placed in this encounter.      No follow-ups on file.      Advance Care Planning and Advance Directives     You make decisions on a daily basis - decisions about where you want to live, your career, your home, your life. Perhaps one of the most important decisions you face is your choice for future medical care. Take time to talk with your family and your healthcare team and start planning today.  Advance Care Planning is a process that can help you:  · Understand possible future healthcare decisions in light of your own experiences  · Reflect on those decision in light of your goals and values  · Discuss your decisions with those closest to you and the healthcare professionals that care for you  · Make a plan by creating a document that reflects your wishes    Surrogate Decision Maker  In the event of a medical emergency, which has left  you unable to communicate or to make your own decisions, you would need someone to make decisions for you.  It is important to discuss your preferences for medical treatment with this person while you are in good health.     Qualities of a surrogate decision maker:  • Willing to take on this role and responsibility  • Knows what you want for future medical care  • Willing to follow your wishes even if they don't agree with them  • Able to make difficult medical decisions under stressful circumstances    Advance Directives  These are legal documents you can create that will guide your healthcare team and decision maker(s) when needed. These documents can be stored in the electronic medical record.    · Living Will - a legal document to guide your care if you have a terminal condition or a serious illness and are unable to communicate. States vary by statute in document names/types, but most forms may include one or more of the following:        -  Directions regarding life-prolonging treatments        -  Directions regarding artificially provided nutrition/hydration        -  Choosing a healthcare decision maker        -  Direction regarding organ/tissue donation    · Durable Power of  for Healthcare - this document names an -in-fact to make medical decisions for you, but it may also allow this person to make personal and financial decisions for you. Please seek the advice of an  if you need this type of document.    **Advance Directives are not required and no one may discriminate against you if you do not sign one.    Medical Orders  Many states allow specific forms/orders signed by your physician to record your wishes for medical treatment in your current state of health. This form, signed in personal communication with your physician, addresses resuscitation and other medical interventions that you may or may not want.      For more information or to schedule a time with a [facility]  Advance Care Planning Facilitator contact: [facility] or call [phone] and someone will contact you directly.    Preventing Unhealthy Weight Gain, Adult  Staying at a healthy weight is important to your overall health. When fat builds up in your body, you may become overweight or obese. Being overweight or obese increases your risk of developing certain health problems, such as heart disease, diabetes, sleeping problems, joint problems, and some types of cancer.  Unhealthy weight gain is often the result of making unhealthy food choices or not getting enough exercise. You can make changes to your lifestyle to prevent obesity and stay as healthy as possible.  What nutrition changes can be made?    · Eat only as much as your body needs. To do this:  ? Pay attention to signs that you are hungry or full. Stop eating as soon as you feel full.  ? If you feel hungry, try drinking water first before eating. Drink enough water so your urine is clear or pale yellow.  ? Eat smaller portions. Pay attention to portion sizes when eating out.  ? Look at serving sizes on food labels. Most foods contain more than one serving per container.  ? Eat the recommended number of calories for your gender and activity level. For most active people, a daily total of 2,000 calories is appropriate. If you are trying to lose weight or are not very active, you may need to eat fewer calories. Talk with your health care provider or a diet and nutrition specialist (dietitian) about how many calories you need each day.  · Choose healthy foods, such as:  ? Fruits and vegetables. At each meal, try to fill at least half of your plate with fruits and vegetables.  ? Whole grains, such as whole-wheat bread, brown rice, and quinoa.  ? Lean meats, such as chicken or fish.  ? Other healthy proteins, such as beans, eggs, or tofu.  ? Healthy fats, such as nuts, seeds, fatty fish, and olive oil.  ? Low-fat or fat-free dairy products.  · Check food  labels, and avoid food and drinks that:  ? Are high in calories.  ? Have added sugar.  ? Are high in sodium.  ? Have saturated fats or trans fats.  · Cook foods in healthier ways, such as by baking, broiling, or grilling.  · Make a meal plan for the week, and shop with a grocery list to help you stay on track with your purchases. Try to avoid going to the grocery store when you are hungry.  · When grocery shopping, try to shop around the outside of the store first, where the fresh foods are. Doing this helps you to avoid prepackaged foods, which can be high in sugar, salt (sodium), and fat.  What lifestyle changes can be made?    · Exercise for 30 or more minutes on 5 or more days each week. Exercising may include brisk walking, yard work, biking, running, swimming, and team sports like basketball and soccer. Ask your health care provider which exercises are safe for you.  · Do muscle-strengthening activities, such as lifting weights or using resistance bands, on 2 or more days a week.  · Do not use any products that contain nicotine or tobacco, such as cigarettes and e-cigarettes. If you need help quitting, ask your health care provider.  · Limit alcohol intake to no more than 1 drink a day for nonpregnant women and 2 drinks a day for men. One drink equals 12 oz of beer, 5 oz of wine, or 1½ oz of hard liquor.  · Try to get 7-9 hours of sleep each night.  What other changes can be made?  · Keep a food and activity journal to keep track of:  ? What you ate and how many calories you had. Remember to count the calories in sauces, dressings, and side dishes.  ? Whether you were active, and what exercises you did.  ? Your calorie, weight, and activity goals.  · Check your weight regularly. Track any changes. If you notice you have gained weight, make changes to your diet or activity routine.  · Avoid taking weight-loss medicines or supplements. Talk to your health care provider before starting any new medicine or  supplement.  · Talk to your health care provider before trying any new diet or exercise plan.  Why are these changes important?  Eating healthy, staying active, and having healthy habits can help you to prevent obesity. Those changes also:  · Help you manage stress and emotions.  · Help you connect with friends and family.  · Improve your self-esteem.  · Improve your sleep.  · Prevent long-term health problems.  What can happen if changes are not made?  Being obese or overweight can cause you to develop joint or bone problems, which can make it hard for you to stay active or do activities you enjoy. Being obese or overweight also puts stress on your heart and lungs and can lead to health problems like diabetes, heart disease, and some cancers.  Where to find more information  Talk with your health care provider or a dietitian about healthy eating and healthy lifestyle choices. You may also find information from:  · [place] Department of Agriculture, MyPlate: www.choosemyplate.gov  · American Heart Association: www.heart.org  · Centers for Disease Control and Prevention: www.cdc.gov  Summary  · Staying at a healthy weight is important to your overall health. It helps you to prevent certain diseases and health problems, such as heart disease, diabetes, joint problems, sleep disorders, and some types of cancer.  · Being obese or overweight can cause you to develop joint or bone problems, which can make it hard for you to stay active or do activities you enjoy.  · You can prevent unhealthy weight gain by eating a healthy diet, exercising regularly, not smoking, limiting alcohol, and getting enough sleep.  · Talk with your health care provider or a dietitian for guidance about healthy eating and healthy lifestyle choices.  This information is not intended to replace advice given to you by your health care provider. Make sure you discuss any questions you have with your health care provider.  Document Released: 12/19/2017  Document Revised: 09/28/2018 Document Reviewed: 01/24/2018  [employer] Patient Education © 2020 [employer]

## 2020-05-05 ENCOUNTER — OFFICE VISIT (OUTPATIENT)
Dept: FAMILY MEDICINE CLINIC | Facility: CLINIC | Age: 80
End: 2020-05-05

## 2020-05-05 VITALS
HEIGHT: 63 IN | HEART RATE: 75 BPM | OXYGEN SATURATION: 99 % | BODY MASS INDEX: 33.45 KG/M2 | SYSTOLIC BLOOD PRESSURE: 131 MMHG | WEIGHT: 188.8 LBS | DIASTOLIC BLOOD PRESSURE: 79 MMHG | TEMPERATURE: 98 F

## 2020-05-05 DIAGNOSIS — E78.2 MIXED HYPERLIPIDEMIA: ICD-10-CM

## 2020-05-05 DIAGNOSIS — I10 ESSENTIAL HYPERTENSION: Primary | ICD-10-CM

## 2020-05-05 DIAGNOSIS — Z12.39 BREAST CANCER SCREENING: ICD-10-CM

## 2020-05-05 DIAGNOSIS — F41.9 ANXIETY: ICD-10-CM

## 2020-05-05 DIAGNOSIS — N39.0 URINARY TRACT INFECTION WITHOUT HEMATURIA, SITE UNSPECIFIED: ICD-10-CM

## 2020-05-05 DIAGNOSIS — G43.809 OTHER MIGRAINE WITHOUT STATUS MIGRAINOSUS, NOT INTRACTABLE: ICD-10-CM

## 2020-05-05 DIAGNOSIS — R53.83 FATIGUE, UNSPECIFIED TYPE: ICD-10-CM

## 2020-05-05 DIAGNOSIS — Z12.31 ENCOUNTER FOR SCREENING MAMMOGRAM FOR MALIGNANT NEOPLASM OF BREAST: ICD-10-CM

## 2020-05-05 DIAGNOSIS — K21.9 GASTROESOPHAGEAL REFLUX DISEASE WITHOUT ESOPHAGITIS: ICD-10-CM

## 2020-05-05 LAB
BILIRUB BLD-MCNC: NEGATIVE MG/DL
CLARITY, POC: CLEAR
COLOR UR: YELLOW
GLUCOSE UR STRIP-MCNC: NEGATIVE MG/DL
KETONES UR QL: NEGATIVE
LEUKOCYTE EST, POC: ABNORMAL
NITRITE UR-MCNC: NEGATIVE MG/ML
PH UR: 7.5 [PH] (ref 5–8)
PROT UR STRIP-MCNC: NEGATIVE MG/DL
RBC # UR STRIP: NEGATIVE /UL
SP GR UR: 1.02 (ref 1–1.03)
UROBILINOGEN UR QL: NORMAL

## 2020-05-05 PROCEDURE — G0439 PPPS, SUBSEQ VISIT: HCPCS | Performed by: NURSE PRACTITIONER

## 2020-05-05 PROCEDURE — 81003 URINALYSIS AUTO W/O SCOPE: CPT | Performed by: NURSE PRACTITIONER

## 2020-05-05 RX ORDER — ASPIRIN 81 MG/1
81 TABLET ORAL DAILY
Qty: 30 TABLET | Refills: 0
Start: 2020-05-05

## 2020-05-05 RX ORDER — BUTALBITAL, ACETAMINOPHEN AND CAFFEINE 50; 325; 40 MG/1; MG/1; MG/1
TABLET ORAL
Qty: 10 TABLET | Refills: 0 | Status: SHIPPED | OUTPATIENT
Start: 2020-05-05 | End: 2020-12-18 | Stop reason: SDUPTHER

## 2020-05-06 LAB
ALBUMIN SERPL-MCNC: 4.6 G/DL (ref 3.5–5.2)
ALBUMIN/GLOB SERPL: 1.6 G/DL
ALP SERPL-CCNC: 54 U/L (ref 39–117)
ALT SERPL-CCNC: 11 U/L (ref 1–33)
AST SERPL-CCNC: 17 U/L (ref 1–32)
BASOPHILS # BLD AUTO: 0.07 10*3/MM3 (ref 0–0.2)
BASOPHILS NFR BLD AUTO: 1.3 % (ref 0–1.5)
BILIRUB SERPL-MCNC: 0.2 MG/DL (ref 0.2–1.2)
BUN SERPL-MCNC: 19 MG/DL (ref 8–23)
BUN/CREAT SERPL: 17.1 (ref 7–25)
CALCIUM SERPL-MCNC: 9.9 MG/DL (ref 8.6–10.5)
CHLORIDE SERPL-SCNC: 95 MMOL/L (ref 98–107)
CHOLEST SERPL-MCNC: 181 MG/DL (ref 0–200)
CO2 SERPL-SCNC: 25 MMOL/L (ref 22–29)
CREAT SERPL-MCNC: 1.11 MG/DL (ref 0.57–1)
EOSINOPHIL # BLD AUTO: 0.09 10*3/MM3 (ref 0–0.4)
EOSINOPHIL NFR BLD AUTO: 1.6 % (ref 0.3–6.2)
ERYTHROCYTE [DISTWIDTH] IN BLOOD BY AUTOMATED COUNT: 13.2 % (ref 12.3–15.4)
GLOBULIN SER CALC-MCNC: 2.8 GM/DL
GLUCOSE SERPL-MCNC: 99 MG/DL (ref 65–99)
HCT VFR BLD AUTO: 41.6 % (ref 34–46.6)
HDLC SERPL-MCNC: 52 MG/DL (ref 40–60)
HGB BLD-MCNC: 14.1 G/DL (ref 12–15.9)
IMM GRANULOCYTES # BLD AUTO: 0.02 10*3/MM3 (ref 0–0.05)
IMM GRANULOCYTES NFR BLD AUTO: 0.4 % (ref 0–0.5)
LDLC SERPL CALC-MCNC: 93 MG/DL (ref 0–100)
LYMPHOCYTES # BLD AUTO: 1.89 10*3/MM3 (ref 0.7–3.1)
LYMPHOCYTES NFR BLD AUTO: 34.6 % (ref 19.6–45.3)
MCH RBC QN AUTO: 32.4 PG (ref 26.6–33)
MCHC RBC AUTO-ENTMCNC: 33.9 G/DL (ref 31.5–35.7)
MCV RBC AUTO: 95.6 FL (ref 79–97)
MONOCYTES # BLD AUTO: 0.51 10*3/MM3 (ref 0.1–0.9)
MONOCYTES NFR BLD AUTO: 9.3 % (ref 5–12)
NEUTROPHILS # BLD AUTO: 2.88 10*3/MM3 (ref 1.7–7)
NEUTROPHILS NFR BLD AUTO: 52.8 % (ref 42.7–76)
NRBC BLD AUTO-RTO: 0 /100 WBC (ref 0–0.2)
PLATELET # BLD AUTO: 288 10*3/MM3 (ref 140–450)
POTASSIUM SERPL-SCNC: 4.5 MMOL/L (ref 3.5–5.2)
PROT SERPL-MCNC: 7.4 G/DL (ref 6–8.5)
RBC # BLD AUTO: 4.35 10*6/MM3 (ref 3.77–5.28)
SODIUM SERPL-SCNC: 135 MMOL/L (ref 136–145)
T4 FREE SERPL-MCNC: 1.17 NG/DL (ref 0.93–1.7)
TRIGL SERPL-MCNC: 179 MG/DL (ref 0–150)
TSH SERPL DL<=0.005 MIU/L-ACNC: 3.2 UIU/ML (ref 0.27–4.2)
VLDLC SERPL CALC-MCNC: 35.8 MG/DL (ref 5–40)
WBC # BLD AUTO: 5.46 10*3/MM3 (ref 3.4–10.8)

## 2020-05-09 LAB
BACTERIA UR CULT: ABNORMAL
BACTERIA UR CULT: ABNORMAL
OTHER ANTIBIOTIC SUSC ISLT: ABNORMAL

## 2020-05-11 DIAGNOSIS — N30.00 ACUTE CYSTITIS WITHOUT HEMATURIA: Primary | ICD-10-CM

## 2020-05-11 RX ORDER — AMOXICILLIN AND CLAVULANATE POTASSIUM 500; 125 MG/1; MG/1
1 TABLET, FILM COATED ORAL 2 TIMES DAILY
Qty: 14 TABLET | Refills: 0 | Status: SHIPPED | OUTPATIENT
Start: 2020-05-11 | End: 2020-05-22

## 2020-05-11 RX ORDER — LISINOPRIL 10 MG/1
TABLET ORAL
Qty: 90 TABLET | Refills: 3 | Status: SHIPPED | OUTPATIENT
Start: 2020-05-11 | End: 2021-05-10

## 2020-05-11 RX ORDER — INDAPAMIDE 2.5 MG/1
TABLET, FILM COATED ORAL
Qty: 36 TABLET | Refills: 3 | Status: SHIPPED | OUTPATIENT
Start: 2020-05-11 | End: 2021-06-28

## 2020-05-11 NOTE — PROGRESS NOTES
Urine culture shows infection.  Antibiotics sent to pharmacy.  Take with food and eat yogurt daily while taking to protect stomach.

## 2020-05-22 ENCOUNTER — OFFICE VISIT (OUTPATIENT)
Dept: FAMILY MEDICINE CLINIC | Facility: CLINIC | Age: 80
End: 2020-05-22

## 2020-05-22 VITALS
BODY MASS INDEX: 33.63 KG/M2 | HEIGHT: 63 IN | WEIGHT: 189.8 LBS | TEMPERATURE: 98.8 F | DIASTOLIC BLOOD PRESSURE: 75 MMHG | SYSTOLIC BLOOD PRESSURE: 130 MMHG | OXYGEN SATURATION: 98 % | HEART RATE: 75 BPM

## 2020-05-22 DIAGNOSIS — M54.50 ACUTE LOW BACK PAIN, UNSPECIFIED BACK PAIN LATERALITY, UNSPECIFIED WHETHER SCIATICA PRESENT: Primary | ICD-10-CM

## 2020-05-22 DIAGNOSIS — R51.9 NONINTRACTABLE HEADACHE, UNSPECIFIED CHRONICITY PATTERN, UNSPECIFIED HEADACHE TYPE: ICD-10-CM

## 2020-05-22 DIAGNOSIS — N30.01 ACUTE CYSTITIS WITH HEMATURIA: ICD-10-CM

## 2020-05-22 DIAGNOSIS — R10.30 LOWER ABDOMINAL PAIN: ICD-10-CM

## 2020-05-22 LAB
BILIRUB BLD-MCNC: NEGATIVE MG/DL
CLARITY, POC: ABNORMAL
COLOR UR: YELLOW
GLUCOSE UR STRIP-MCNC: NEGATIVE MG/DL
KETONES UR QL: NEGATIVE
LEUKOCYTE EST, POC: ABNORMAL
NITRITE UR-MCNC: NEGATIVE MG/ML
PH UR: 5.5 [PH] (ref 5–8)
PROT UR STRIP-MCNC: ABNORMAL MG/DL
RBC # UR STRIP: ABNORMAL /UL
SP GR UR: 1.01 (ref 1–1.03)
UROBILINOGEN UR QL: NORMAL

## 2020-05-22 PROCEDURE — 81003 URINALYSIS AUTO W/O SCOPE: CPT | Performed by: NURSE PRACTITIONER

## 2020-05-22 PROCEDURE — 99214 OFFICE O/P EST MOD 30 MIN: CPT | Performed by: NURSE PRACTITIONER

## 2020-05-22 NOTE — PROGRESS NOTES
CC: abdominal pain    History:  Hailey Dickerson is a 79 y.o. female who presents today for evaluation of the above problems.   Abdominal  Pain - low.  Cramping.  Believes that she has had some stool leaking, possibly vaginally on one occasion. Just finished augmentin for UTI.  This medication initially made her stomach feel a little better, but then symptoms returned.  Denies constipation or diarrhea.  Has 1-2 bowel movements daily.   Eating does not affect discomfort.  Present at visit on 5/5, but she did not mention it. Feels weak all over. She is status post cholecystectomy, but has not had hysterectomy.      HPI  ROS:  Review of Systems   Gastrointestinal: Positive for abdominal pain, nausea (occasionally) and rectal pain (rawness with BM). Negative for abdominal distention, constipation and diarrhea.   Genitourinary: Negative for dysuria.   Neurological: Positive for headaches.       Allergies   Allergen Reactions   • Lortab [Hydrocodone-Acetaminophen] Nausea And Vomiting   • Ciprocinonide [Fluocinolone] Rash   • Sulfa Antibiotics Rash     Past Medical History:   Diagnosis Date   • Anxiety    • Arthritis    • GERD (gastroesophageal reflux disease)    • Hypertension      Past Surgical History:   Procedure Laterality Date   • ANKLE SURGERY     • CHOLECYSTECTOMY     • DILATATION AND CURETTAGE     • TOTAL KNEE ARTHROPLASTY Left 11/6/2019    Procedure: TOTAL KNEE REPLACEMENT;  Surgeon: Rashaad Capps MD;  Location: [facility];  Service: Orthopedics     Family History   Problem Relation Age of Onset   • Heart attack Mother    • Cancer Father         Lung   • No Known Problems Maternal Grandmother    • No Known Problems Maternal Grandfather    • No Known Problems Paternal Grandmother    • No Known Problems Paternal Grandfather       reports that she has never smoked. She has never used smokeless tobacco. She reports that she does not drink alcohol or use drugs.      Current Outpatient Medications:   •  alendronate  "(FOSAMAX) 70 MG tablet, Take 1 tablet by mouth Every 7 (Seven) Days., Disp: 12 tablet, Rfl: 3  •  aspirin 81 MG EC tablet, Take 1 tablet by mouth Daily., Disp: 30 tablet, Rfl: 0  •  butalbital-acetaminophen-caffeine (FIORICET, ESGIC) -40 MG per tablet, One every 6 hours when necessary headache, Disp: 10 tablet, Rfl: 0  •  citalopram (CeleXA) 20 MG tablet, Take 1 tablet by mouth once daily, Disp: 90 tablet, Rfl: 0  •  Cranberry 125 MG tablet, Take 1 tablet by mouth Daily., Disp: , Rfl:   •  famotidine (PEPCID) 40 MG tablet, Take 1 tablet by mouth every night at bedtime., Disp: 90 tablet, Rfl: 3  •  indapamide (LOZOL) 2.5 MG tablet, TAKE 1 TABLET BY MOUTH ON MONDAY, WEDNESDAY AND FRIDAY, Disp: 36 tablet, Rfl: 3  •  lisinopril (PRINIVIL,ZESTRIL) 10 MG tablet, Take 1 tablet by mouth once daily, Disp: 90 tablet, Rfl: 3  •  Multiple Vitamins-Minerals (MULTIVITAMIN WITH MINERALS) tablet tablet, Take 1 tablet by mouth Daily., Disp: , Rfl:   •  simvastatin (ZOCOR) 20 MG tablet, TAKE 1 TABLET BY MOUTH ONCE DAILY IN THE EVENING, Disp: 90 tablet, Rfl: 0  •  ondansetron (ZOFRAN) 4 MG tablet, Take 1 tablet by mouth Every 8 (Eight) Hours As Needed for Nausea or Vomiting., Disp: 30 tablet, Rfl: 0    OBJECTIVE:  /75 (BP Location: Left arm, Patient Position: Sitting, Cuff Size: Adult)   Pulse 75   Temp 98.8 °F (37.1 °C) (Temporal)   Ht 160 cm (63\")   Wt 86.1 kg (189 lb 12.8 oz)   LMP  (LMP Unknown)   SpO2 98%   Breastfeeding No   BMI 33.62 kg/m²    Physical Exam   Constitutional: She is oriented to person, place, and time. Vital signs are normal. She appears well-developed and well-nourished.   Cardiovascular: Normal rate.   Pulmonary/Chest: Effort normal.   Abdominal: Soft. Bowel sounds are normal. She exhibits no distension. There is no tenderness.   Neurological: She is alert and oriented to person, place, and time.   Psychiatric: She has a normal mood and affect. Her behavior is normal.   Vitals " reviewed.      Assessment/Plan    Hailey was seen today for abdominal pain, back pain and headache.    Diagnoses and all orders for this visit:    Acute low back pain, unspecified back pain laterality, unspecified whether sciatica present  -     POC Urinalysis Dipstick, Multipro    Nonintractable headache, unspecified chronicity pattern, unspecified headache type  -     POC Urinalysis Dipstick, Multipro    Acute cystitis with hematuria  -     Urine Culture - Urine, Urine, Clean Catch    Lower abdominal pain  -     CT Abdomen Pelvis Without Contrast; Future    Would suspect that this may be augmentin side effect, however, patient states that symptoms were present on 5/5.  Need more information.  CT abdomen pelvis. Could possibly represent adhesions? Uterine fibroids?    Patient was masked upon entering facility.  I wore N95 mask, with face shield and gloves.  MA wore N95 mask with face shield  and gloves.        An After Visit Summary was printed and given to the patient at discharge.  Return if symptoms worsen or fail to improve.       EUGENE Pham 05/22/2020     Electronically signed.

## 2020-05-22 NOTE — PROGRESS NOTES
Patient advised of negative CT scan.  Yogurt twice a day.  Start Zyrtec for allergy related headache.  See back in 2 weeks.

## 2020-05-25 LAB
BACTERIA UR CULT: ABNORMAL
OTHER ANTIBIOTIC SUSC ISLT: ABNORMAL

## 2020-05-26 RX ORDER — CEFDINIR 300 MG/1
300 CAPSULE ORAL 2 TIMES DAILY
Qty: 14 CAPSULE | Refills: 0 | Status: SHIPPED | OUTPATIENT
Start: 2020-05-26 | End: 2020-06-02

## 2020-06-04 ENCOUNTER — OFFICE VISIT (OUTPATIENT)
Dept: FAMILY MEDICINE CLINIC | Facility: CLINIC | Age: 80
End: 2020-06-04

## 2020-06-04 VITALS
WEIGHT: 193 LBS | BODY MASS INDEX: 34.2 KG/M2 | TEMPERATURE: 99.2 F | SYSTOLIC BLOOD PRESSURE: 130 MMHG | HEART RATE: 74 BPM | DIASTOLIC BLOOD PRESSURE: 74 MMHG | OXYGEN SATURATION: 99 % | HEIGHT: 63 IN

## 2020-06-04 DIAGNOSIS — R10.84 GENERALIZED ABDOMINAL PAIN: Primary | ICD-10-CM

## 2020-06-04 PROCEDURE — 99213 OFFICE O/P EST LOW 20 MIN: CPT | Performed by: NURSE PRACTITIONER

## 2020-06-04 RX ORDER — CETIRIZINE HYDROCHLORIDE 10 MG/1
10 TABLET ORAL DAILY
COMMUNITY

## 2020-06-04 NOTE — PROGRESS NOTES
CC: abdominal pain follow up     History:  Hailey Dickerson is a 79 y.o. female who presents today for evaluation of the above problems.     Hailey was seen on 05/22/2020 for abdominal pain.  CT scan was negative.  Had been on recent Augmentin and we felt it was likely side effect of medication.  Today, she reports that her pain has resolved.  She did experience a short period of discomfort last night however, it resolved shortly after having a BM.       HPI  ROS:  Review of Systems   Constitutional: Negative for fever.   Gastrointestinal: Negative for abdominal distention, abdominal pain, constipation and diarrhea.       Allergies   Allergen Reactions   • Lortab [Hydrocodone-Acetaminophen] Nausea And Vomiting   • Ciprocinonide [Fluocinolone] Rash   • Sulfa Antibiotics Rash     Past Medical History:   Diagnosis Date   • Anxiety    • Arthritis    • GERD (gastroesophageal reflux disease)    • Hypertension      Past Surgical History:   Procedure Laterality Date   • ANKLE SURGERY     • CHOLECYSTECTOMY     • DILATATION AND CURETTAGE     • TOTAL KNEE ARTHROPLASTY Left 11/6/2019    Procedure: TOTAL KNEE REPLACEMENT;  Surgeon: Rashaad Capps MD;  Location: [facility];  Service: Orthopedics     Family History   Problem Relation Age of Onset   • Heart attack Mother    • Cancer Father         Lung   • No Known Problems Maternal Grandmother    • No Known Problems Maternal Grandfather    • No Known Problems Paternal Grandmother    • No Known Problems Paternal Grandfather       reports that she has never smoked. She has never used smokeless tobacco. She reports that she does not drink alcohol or use drugs.      Current Outpatient Medications:   •  alendronate (FOSAMAX) 70 MG tablet, Take 1 tablet by mouth Every 7 (Seven) Days., Disp: 12 tablet, Rfl: 3  •  aspirin 81 MG EC tablet, Take 1 tablet by mouth Daily., Disp: 30 tablet, Rfl: 0  •  cetirizine (zyrTEC) 10 MG tablet, Take 10 mg by mouth Daily., Disp: , Rfl:   •  citalopram  "(CeleXA) 20 MG tablet, Take 1 tablet by mouth once daily, Disp: 90 tablet, Rfl: 0  •  Cranberry 125 MG tablet, Take 1 tablet by mouth Daily., Disp: , Rfl:   •  famotidine (PEPCID) 40 MG tablet, Take 1 tablet by mouth every night at bedtime., Disp: 90 tablet, Rfl: 3  •  indapamide (LOZOL) 2.5 MG tablet, TAKE 1 TABLET BY MOUTH ON MONDAY, WEDNESDAY AND FRIDAY, Disp: 36 tablet, Rfl: 3  •  lisinopril (PRINIVIL,ZESTRIL) 10 MG tablet, Take 1 tablet by mouth once daily, Disp: 90 tablet, Rfl: 3  •  Multiple Vitamins-Minerals (MULTIVITAMIN WITH MINERALS) tablet tablet, Take 1 tablet by mouth Daily., Disp: , Rfl:   •  simvastatin (ZOCOR) 20 MG tablet, TAKE 1 TABLET BY MOUTH ONCE DAILY IN THE EVENING, Disp: 90 tablet, Rfl: 0  •  butalbital-acetaminophen-caffeine (FIORICET, ESGIC) -40 MG per tablet, One every 6 hours when necessary headache, Disp: 10 tablet, Rfl: 0  •  ondansetron (ZOFRAN) 4 MG tablet, Take 1 tablet by mouth Every 8 (Eight) Hours As Needed for Nausea or Vomiting., Disp: 30 tablet, Rfl: 0    OBJECTIVE:  /74 (BP Location: Left arm, Patient Position: Sitting, Cuff Size: Adult)   Pulse 74   Temp 99.2 °F (37.3 °C) (Temporal)   Ht 160 cm (63\")   Wt 87.5 kg (193 lb)   LMP  (LMP Unknown)   SpO2 99%   Breastfeeding No   BMI 34.19 kg/m²    Physical Exam   Constitutional: She is oriented to person, place, and time. Vital signs are normal. She appears well-developed and well-nourished.   Cardiovascular: Normal rate and regular rhythm.   Pulmonary/Chest: Effort normal and breath sounds normal.   Abdominal: Soft. Bowel sounds are normal.   Neurological: She is alert and oriented to person, place, and time.   Psychiatric: She has a normal mood and affect. Her behavior is normal.   Vitals reviewed.      Assessment/Plan    Hailey was seen today for abdominal pain.    Diagnoses and all orders for this visit:    Generalized abdominal pain    Symptoms have resolved. No further intervention necessary at this " time.  Follow up if symptoms return for GI PCR panel.     Patient was masked upon entering facility.  I wore N95 mask, with face shield and gloves.  MA wore N95 mask with face shield and gloves.        An After Visit Summary was printed and given to the patient at discharge.  Return if symptoms worsen or fail to improve, for Next scheduled follow up.       EUGENE Pham 06/04/2020    Electronically signed.

## 2020-06-08 RX ORDER — FAMOTIDINE 20 MG/1
TABLET, FILM COATED ORAL
Qty: 180 TABLET | Refills: 3 | Status: SHIPPED | OUTPATIENT
Start: 2020-06-08 | End: 2021-06-21

## 2020-06-29 RX ORDER — SIMVASTATIN 20 MG
TABLET ORAL
Qty: 90 TABLET | Refills: 0 | Status: SHIPPED | OUTPATIENT
Start: 2020-06-29 | End: 2020-09-28

## 2020-07-27 RX ORDER — CITALOPRAM 20 MG/1
TABLET ORAL
Qty: 90 TABLET | Refills: 3 | Status: SHIPPED | OUTPATIENT
Start: 2020-07-27 | End: 2021-08-02

## 2020-09-28 ENCOUNTER — OFFICE VISIT (OUTPATIENT)
Dept: FAMILY MEDICINE CLINIC | Facility: CLINIC | Age: 80
End: 2020-09-28

## 2020-09-28 VITALS — WEIGHT: 190 LBS | BODY MASS INDEX: 33.66 KG/M2

## 2020-09-28 DIAGNOSIS — J06.9 UPPER RESPIRATORY TRACT INFECTION, UNSPECIFIED TYPE: Primary | ICD-10-CM

## 2020-09-28 PROCEDURE — 99441 PR PHYS/QHP TELEPHONE EVALUATION 5-10 MIN: CPT | Performed by: NURSE PRACTITIONER

## 2020-09-28 RX ORDER — SIMVASTATIN 20 MG
TABLET ORAL
Qty: 90 TABLET | Refills: 2 | Status: SHIPPED | OUTPATIENT
Start: 2020-09-28 | End: 2021-07-06

## 2020-09-28 RX ORDER — AZITHROMYCIN 250 MG/1
TABLET, FILM COATED ORAL
Qty: 6 TABLET | Refills: 0 | Status: SHIPPED | OUTPATIENT
Start: 2020-09-28 | End: 2020-11-05

## 2020-09-28 NOTE — PROGRESS NOTES
CC: Cough, URI    History:  Hailey Dickerson is a 80 y.o. female who presents today for evaluation of the above problems.      Has been coughing and having chest congestion since Saturday.  Has been taking OTC cough medication and this has helped.  Denies shortness of breath, or change in taste or smell.  Does feel that drainage has settled in her chest.  She has not yet had a flu shot this year.     HPI  ROS:  Review of Systems   Constitutional: Negative for chills and fever.   HENT: Positive for sore throat. Negative for postnasal drip.    Respiratory: Positive for cough. Negative for shortness of breath.         Chest congestion    Gastrointestinal: Negative for diarrhea and nausea.   Neurological: Negative for headaches.       Allergies   Allergen Reactions   • Lortab [Hydrocodone-Acetaminophen] Nausea And Vomiting   • Ciprocinonide [Fluocinolone] Rash   • Sulfa Antibiotics Rash     Past Medical History:   Diagnosis Date   • Anxiety    • Arthritis    • GERD (gastroesophageal reflux disease)    • Hypertension      Past Surgical History:   Procedure Laterality Date   • ANKLE SURGERY     • CHOLECYSTECTOMY     • DILATATION AND CURETTAGE     • TOTAL KNEE ARTHROPLASTY Left 11/6/2019    Procedure: TOTAL KNEE REPLACEMENT;  Surgeon: Rashaad Capps MD;  Location: [facility];  Service: Orthopedics     Family History   Problem Relation Age of Onset   • Heart attack Mother    • Cancer Father         Lung   • No Known Problems Maternal Grandmother    • No Known Problems Maternal Grandfather    • No Known Problems Paternal Grandmother    • No Known Problems Paternal Grandfather       reports that she has never smoked. She has never used smokeless tobacco. She reports that she does not drink alcohol or use drugs.      Current Outpatient Medications:   •  alendronate (FOSAMAX) 70 MG tablet, Take 1 tablet by mouth Every 7 (Seven) Days., Disp: 12 tablet, Rfl: 3  •  aspirin 81 MG EC tablet, Take 1 tablet by mouth Daily., Disp: 30  tablet, Rfl: 0  •  cetirizine (zyrTEC) 10 MG tablet, Take 10 mg by mouth Daily., Disp: , Rfl:   •  citalopram (CeleXA) 20 MG tablet, Take 1 tablet by mouth once daily, Disp: 90 tablet, Rfl: 3  •  Cranberry 125 MG tablet, Take 1 tablet by mouth Daily., Disp: , Rfl:   •  famotidine (PEPCID) 20 MG tablet, TAKE 2 TABLETS BY MOUTH AT BEDTIME, Disp: 180 tablet, Rfl: 3  •  indapamide (LOZOL) 2.5 MG tablet, TAKE 1 TABLET BY MOUTH ON MONDAY, WEDNESDAY AND FRIDAY, Disp: 36 tablet, Rfl: 3  •  lisinopril (PRINIVIL,ZESTRIL) 10 MG tablet, Take 1 tablet by mouth once daily, Disp: 90 tablet, Rfl: 3  •  Multiple Vitamins-Minerals (MULTIVITAMIN WITH MINERALS) tablet tablet, Take 1 tablet by mouth Daily., Disp: , Rfl:   •  simvastatin (ZOCOR) 20 MG tablet, TAKE 1 TABLET BY MOUTH ONCE DAILY IN THE EVENING, Disp: 90 tablet, Rfl: 2  •  azithromycin (Zithromax) 250 MG tablet, Take 2 tablets the first day, then 1 tablet daily for 4 days., Disp: 6 tablet, Rfl: 0  •  butalbital-acetaminophen-caffeine (FIORICET, ESGIC) -40 MG per tablet, One every 6 hours when necessary headache, Disp: 10 tablet, Rfl: 0  •  ondansetron (ZOFRAN) 4 MG tablet, Take 1 tablet by mouth Every 8 (Eight) Hours As Needed for Nausea or Vomiting., Disp: 30 tablet, Rfl: 0    OBJECTIVE:  Wt 86.2 kg (190 lb) Comment: pt reported  LMP  (LMP Unknown)   Breastfeeding No   BMI 33.66 kg/m²    Physical Exam  Psychiatric:         Mood and Affect: Mood normal.         Speech: Speech normal.         Assessment/Plan    Hailey was seen today for cough and uri.    Diagnoses and all orders for this visit:    Upper respiratory tract infection, unspecified type  -     azithromycin (Zithromax) 250 MG tablet; Take 2 tablets the first day, then 1 tablet daily for 4 days.    Given [facility] phone number to set up COVID testing this afternoon.   No steroids ordered since she has not yet had flu shot and needs to get it as soon as she is feeling better.    This visit has been rescheduled as  a phone visit to comply with patient safety concerns in accordance with CDC recommendations. Total time of discussion was 5 minutes.      An After Visit Summary was printed and given to the patient at discharge.  Return if symptoms worsen or fail to improve, for Next scheduled follow up.       EUGENE Pham 09/28/2020    Electronically signed.

## 2020-09-29 ENCOUNTER — TELEPHONE (OUTPATIENT)
Dept: FAMILY MEDICINE CLINIC | Facility: CLINIC | Age: 80
End: 2020-09-29

## 2020-09-29 DIAGNOSIS — R05.9 COUGH: Primary | ICD-10-CM

## 2020-09-29 RX ORDER — DEXTROMETHORPHAN HYDROBROMIDE AND PROMETHAZINE HYDROCHLORIDE 15; 6.25 MG/5ML; MG/5ML
5 SYRUP ORAL 4 TIMES DAILY PRN
Qty: 180 ML | Refills: 0 | Status: SHIPPED | OUTPATIENT
Start: 2020-09-29 | End: 2020-11-05

## 2020-09-29 NOTE — TELEPHONE ENCOUNTER
PT STATES SHE HAVE COVID TEST YESTERDAY EVENING-RESULTS WERE NEGATIVE.  PT IS REQUESTING COUGH SYRUP-COUGHED ALL NIGHT.      [facility]

## 2020-10-21 ENCOUNTER — FLU SHOT (OUTPATIENT)
Dept: FAMILY MEDICINE CLINIC | Facility: CLINIC | Age: 80
End: 2020-10-21

## 2020-10-21 DIAGNOSIS — Z23 NEED FOR IMMUNIZATION AGAINST INFLUENZA: Primary | ICD-10-CM

## 2020-10-21 PROCEDURE — G0008 ADMIN INFLUENZA VIRUS VAC: HCPCS | Performed by: FAMILY MEDICINE

## 2020-10-21 PROCEDURE — 90694 VACC AIIV4 NO PRSRV 0.5ML IM: CPT | Performed by: FAMILY MEDICINE

## 2020-10-21 PROCEDURE — 96372 THER/PROPH/DIAG INJ SC/IM: CPT | Performed by: FAMILY MEDICINE

## 2020-10-21 NOTE — PROGRESS NOTES
Patient received high dose flu vaccine in office today. Patient waited 20 minutes in office after injection with no reactions.

## 2020-11-05 ENCOUNTER — OFFICE VISIT (OUTPATIENT)
Dept: FAMILY MEDICINE CLINIC | Facility: CLINIC | Age: 80
End: 2020-11-05

## 2020-11-05 VITALS
HEART RATE: 67 BPM | TEMPERATURE: 97.4 F | HEIGHT: 63 IN | DIASTOLIC BLOOD PRESSURE: 80 MMHG | WEIGHT: 199 LBS | OXYGEN SATURATION: 97 % | RESPIRATION RATE: 14 BRPM | BODY MASS INDEX: 35.26 KG/M2 | SYSTOLIC BLOOD PRESSURE: 112 MMHG

## 2020-11-05 DIAGNOSIS — E78.2 MIXED HYPERLIPIDEMIA: Primary | ICD-10-CM

## 2020-11-05 PROCEDURE — 99214 OFFICE O/P EST MOD 30 MIN: CPT | Performed by: FAMILY MEDICINE

## 2020-11-05 NOTE — PROGRESS NOTES
Markie Dickerson is a 80 y.o. female.     80-year-old  female with a history of hyperlipidemia    Hyperlipidemia  This is a chronic problem. The current episode started more than 1 year ago. The problem is controlled. Recent lipid tests were reviewed and are normal. Exacerbating diseases include obesity. There are no known factors aggravating her hyperlipidemia. Pertinent negatives include no chest pain or shortness of breath. Current antihyperlipidemic treatment includes diet change and statins. The current treatment provides moderate improvement of lipids. Compliance problems include adherence to diet and adherence to exercise.  Risk factors for coronary artery disease include dyslipidemia, family history, hypertension, a sedentary lifestyle, post-menopausal and obesity.       The following portions of the patient's history were reviewed and updated as appropriate: allergies, current medications, past family history, past medical history, past social history, past surgical history and problem list.    Review of Systems   Respiratory: Negative for shortness of breath.    Cardiovascular: Negative for chest pain and leg swelling.        Sees cardiologist regularly   Genitourinary: Negative for difficulty urinating.        Urologic evaluation okay       Objective   Physical Exam  Vitals signs and nursing note reviewed.   Constitutional:       Appearance: She is obese.   Eyes:      Extraocular Movements: Extraocular movements intact.   Cardiovascular:      Rate and Rhythm: Normal rate and regular rhythm.      Pulses: Normal pulses.      Heart sounds: Normal heart sounds.   Pulmonary:      Effort: Pulmonary effort is normal.      Breath sounds: Normal breath sounds.   Musculoskeletal:      Right lower leg: No edema.      Left lower leg: No edema.   Skin:     General: Skin is warm and dry.   Neurological:      General: No focal deficit present.      Mental Status: She is alert and oriented to person,  place, and time.   Psychiatric:         Mood and Affect: Mood normal.         Behavior: Behavior normal.         Thought Content: Thought content normal.         Judgment: Judgment normal.         Assessment/Plan   Diagnoses and all orders for this visit:    1. Mixed hyperlipidemia (Primary)  -     Comprehensive Metabolic Panel  -     Lipid Panel       Plan above-COVID-19 safety-keep moving

## 2020-11-06 LAB
ALBUMIN SERPL-MCNC: 4.5 G/DL (ref 3.5–5.2)
ALBUMIN/GLOB SERPL: 1.9 G/DL
ALP SERPL-CCNC: 53 U/L (ref 39–117)
ALT SERPL-CCNC: 11 U/L (ref 1–33)
AST SERPL-CCNC: 15 U/L (ref 1–32)
BILIRUB SERPL-MCNC: 0.3 MG/DL (ref 0–1.2)
BUN SERPL-MCNC: 26 MG/DL (ref 8–23)
BUN/CREAT SERPL: 25.7 (ref 7–25)
CALCIUM SERPL-MCNC: 9.5 MG/DL (ref 8.6–10.5)
CHLORIDE SERPL-SCNC: 95 MMOL/L (ref 98–107)
CHOLEST SERPL-MCNC: 188 MG/DL (ref 0–200)
CO2 SERPL-SCNC: 26.1 MMOL/L (ref 22–29)
CREAT SERPL-MCNC: 1.01 MG/DL (ref 0.57–1)
GLOBULIN SER CALC-MCNC: 2.4 GM/DL
GLUCOSE SERPL-MCNC: 87 MG/DL (ref 65–99)
HDLC SERPL-MCNC: 54 MG/DL (ref 40–60)
LDLC SERPL CALC-MCNC: 100 MG/DL (ref 0–100)
POTASSIUM SERPL-SCNC: 4.3 MMOL/L (ref 3.5–5.2)
PROT SERPL-MCNC: 6.9 G/DL (ref 6–8.5)
SODIUM SERPL-SCNC: 134 MMOL/L (ref 136–145)
TRIGL SERPL-MCNC: 200 MG/DL (ref 0–150)
VLDLC SERPL CALC-MCNC: 34 MG/DL (ref 5–40)

## 2020-12-18 ENCOUNTER — OFFICE VISIT (OUTPATIENT)
Dept: FAMILY MEDICINE CLINIC | Facility: CLINIC | Age: 80
End: 2020-12-18

## 2020-12-18 VITALS
HEART RATE: 90 BPM | OXYGEN SATURATION: 96 % | BODY MASS INDEX: 35.4 KG/M2 | SYSTOLIC BLOOD PRESSURE: 126 MMHG | HEIGHT: 63 IN | DIASTOLIC BLOOD PRESSURE: 78 MMHG | TEMPERATURE: 97.5 F | WEIGHT: 199.8 LBS | RESPIRATION RATE: 16 BRPM

## 2020-12-18 DIAGNOSIS — G44.1 OTHER VASCULAR HEADACHE: ICD-10-CM

## 2020-12-18 DIAGNOSIS — G43.809 OTHER MIGRAINE WITHOUT STATUS MIGRAINOSUS, NOT INTRACTABLE: ICD-10-CM

## 2020-12-18 DIAGNOSIS — E66.01 MORBIDLY OBESE (HCC): ICD-10-CM

## 2020-12-18 DIAGNOSIS — E87.1 HYPONATREMIA: Primary | ICD-10-CM

## 2020-12-18 PROCEDURE — 96372 THER/PROPH/DIAG INJ SC/IM: CPT | Performed by: FAMILY MEDICINE

## 2020-12-18 PROCEDURE — 99213 OFFICE O/P EST LOW 20 MIN: CPT | Performed by: FAMILY MEDICINE

## 2020-12-18 RX ORDER — BUTALBITAL, ACETAMINOPHEN AND CAFFEINE 50; 325; 40 MG/1; MG/1; MG/1
TABLET ORAL
Qty: 10 TABLET | Refills: 0 | Status: SHIPPED | OUTPATIENT
Start: 2020-12-18 | End: 2021-03-04

## 2020-12-18 RX ORDER — PROMETHAZINE HYDROCHLORIDE 25 MG/ML
12.5 INJECTION, SOLUTION INTRAMUSCULAR; INTRAVENOUS ONCE
Status: COMPLETED | OUTPATIENT
Start: 2020-12-18 | End: 2020-12-18

## 2020-12-18 RX ADMIN — PROMETHAZINE HYDROCHLORIDE 12.5 MG: 25 INJECTION, SOLUTION INTRAMUSCULAR; INTRAVENOUS at 08:21

## 2020-12-18 NOTE — PROGRESS NOTES
Markie Dickerson is a 80 y.o. female.     80-year-old female complaining of retrobulbar headache bilaterally    Pain  This is a new problem. The current episode started in the past 7 days. The problem occurs daily. The problem has been waxing and waning. Associated symptoms include a change in bowel habit and headaches. Pertinent negatives include no chest pain or fever. Nothing aggravates the symptoms. She has tried nothing for the symptoms.       The following portions of the patient's history were reviewed and updated as appropriate: allergies, current medications, past family history, past medical history, past social history, past surgical history and problem list.    Review of Systems   Constitutional: Negative for fever.   Eyes: Negative for visual disturbance.   Cardiovascular: Negative for chest pain.   Gastrointestinal: Positive for change in bowel habit and diarrhea.        Had some diarrhea before the headache started   Neurological: Positive for headaches.       Objective   Physical Exam  Vitals signs and nursing note reviewed.   Constitutional:       Appearance: She is obese.   HENT:      Right Ear: Tympanic membrane and ear canal normal.      Left Ear: Tympanic membrane and ear canal normal.   Eyes:      Extraocular Movements: Extraocular movements intact.      Pupils: Pupils are equal, round, and reactive to light.   Cardiovascular:      Rate and Rhythm: Normal rate and regular rhythm.   Pulmonary:      Effort: Pulmonary effort is normal.      Breath sounds: Normal breath sounds.   Musculoskeletal:      Right lower leg: No edema.      Left lower leg: No edema.   Neurological:      General: No focal deficit present.      Mental Status: She is alert and oriented to person, place, and time.   Psychiatric:         Mood and Affect: Mood normal.         Behavior: Behavior normal.         Assessment/Plan   Diagnoses and all orders for this visit:    1. Hyponatremia (Primary)  -     Basic  Metabolic Panel    2. Other vascular headache  -     CBC & Differential  -     C-reactive Protein  -     Sedimentation Rate  -     promethazine (PHENERGAN) injection 12.5 mg    3. Morbidly obese (CMS/HCC)    4. Other migraine without status migrainosus, not intractable  -     butalbital-acetaminophen-caffeine (FIORICET, ESGIC) -40 MG per tablet; One every 6 hours when necessary headache  Dispense: 10 tablet; Refill: 0    Plan above

## 2020-12-19 LAB
BASOPHILS # BLD AUTO: 0.07 10*3/MM3 (ref 0–0.2)
BASOPHILS NFR BLD AUTO: 1.2 % (ref 0–1.5)
BUN SERPL-MCNC: 23 MG/DL (ref 8–23)
BUN/CREAT SERPL: 20.5 (ref 7–25)
CALCIUM SERPL-MCNC: 9.2 MG/DL (ref 8.6–10.5)
CHLORIDE SERPL-SCNC: 100 MMOL/L (ref 98–107)
CO2 SERPL-SCNC: 26.6 MMOL/L (ref 22–29)
CREAT SERPL-MCNC: 1.12 MG/DL (ref 0.57–1)
CRP SERPL-MCNC: 2.48 MG/DL (ref 0–0.5)
EOSINOPHIL # BLD AUTO: 0.13 10*3/MM3 (ref 0–0.4)
EOSINOPHIL NFR BLD AUTO: 2.3 % (ref 0.3–6.2)
ERYTHROCYTE [DISTWIDTH] IN BLOOD BY AUTOMATED COUNT: 12 % (ref 12.3–15.4)
ERYTHROCYTE [SEDIMENTATION RATE] IN BLOOD BY WESTERGREN METHOD: 14 MM/HR (ref 0–30)
GLUCOSE SERPL-MCNC: 97 MG/DL (ref 65–99)
HCT VFR BLD AUTO: 41.5 % (ref 34–46.6)
HGB BLD-MCNC: 13.7 G/DL (ref 12–15.9)
IMM GRANULOCYTES # BLD AUTO: 0.02 10*3/MM3 (ref 0–0.05)
IMM GRANULOCYTES NFR BLD AUTO: 0.4 % (ref 0–0.5)
LYMPHOCYTES # BLD AUTO: 2.14 10*3/MM3 (ref 0.7–3.1)
LYMPHOCYTES NFR BLD AUTO: 37.7 % (ref 19.6–45.3)
MCH RBC QN AUTO: 32.9 PG (ref 26.6–33)
MCHC RBC AUTO-ENTMCNC: 33 G/DL (ref 31.5–35.7)
MCV RBC AUTO: 99.5 FL (ref 79–97)
MONOCYTES # BLD AUTO: 0.68 10*3/MM3 (ref 0.1–0.9)
MONOCYTES NFR BLD AUTO: 12 % (ref 5–12)
NEUTROPHILS # BLD AUTO: 2.63 10*3/MM3 (ref 1.7–7)
NEUTROPHILS NFR BLD AUTO: 46.4 % (ref 42.7–76)
NRBC BLD AUTO-RTO: 0 /100 WBC (ref 0–0.2)
PLATELET # BLD AUTO: 255 10*3/MM3 (ref 140–450)
POTASSIUM SERPL-SCNC: 4.5 MMOL/L (ref 3.5–5.2)
RBC # BLD AUTO: 4.17 10*6/MM3 (ref 3.77–5.28)
SODIUM SERPL-SCNC: 139 MMOL/L (ref 136–145)
WBC # BLD AUTO: 5.67 10*3/MM3 (ref 3.4–10.8)

## 2021-03-04 ENCOUNTER — OFFICE VISIT (OUTPATIENT)
Dept: FAMILY MEDICINE CLINIC | Facility: CLINIC | Age: 81
End: 2021-03-04

## 2021-03-04 VITALS
DIASTOLIC BLOOD PRESSURE: 79 MMHG | HEART RATE: 72 BPM | WEIGHT: 204.6 LBS | TEMPERATURE: 98.4 F | BODY MASS INDEX: 36.25 KG/M2 | HEIGHT: 63 IN | SYSTOLIC BLOOD PRESSURE: 125 MMHG | OXYGEN SATURATION: 99 %

## 2021-03-04 DIAGNOSIS — R19.5 LOOSE STOOLS: ICD-10-CM

## 2021-03-04 DIAGNOSIS — R10.9 ABDOMINAL PAIN, UNSPECIFIED ABDOMINAL LOCATION: Primary | ICD-10-CM

## 2021-03-04 DIAGNOSIS — R41.3 MEMORY CHANGES: ICD-10-CM

## 2021-03-04 PROCEDURE — 99214 OFFICE O/P EST MOD 30 MIN: CPT | Performed by: NURSE PRACTITIONER

## 2021-03-04 NOTE — PROGRESS NOTES
"CC: abdominal cramping and diarrhea    History:  Hailey Dickerson is a 80 y.o. female who presents today for evaluation of the above problems.      Cramping and diarrhea off and on for about two weeks.   Comes on very quickly when it occurs. Has not had BM today.   Diarrhea yesterday (Wednesday)  and Monday. Does have some normal bowel movements as well, so symptoms are sporadic.  Will develop headache when she has diarrhea and feel weak and fatigued.  Notes that she has none of these symptoms currently.   Last colonoscopy was in 2013 with a 10 year recall.     Also notes that she has difficulty remembering people's last names and wants to know if I can give her \"memory medication.\"       HPI  ROS:  Review of Systems   Constitutional: Positive for fatigue. Negative for fever.   Gastrointestinal: Positive for abdominal pain and diarrhea.   Neurological: Positive for headaches.       Allergies   Allergen Reactions   • Lortab [Hydrocodone-Acetaminophen] Nausea And Vomiting   • Ciprocinonide [Fluocinolone] Rash   • Sulfa Antibiotics Rash     Past Medical History:   Diagnosis Date   • Anxiety    • Arthritis    • GERD (gastroesophageal reflux disease)    • Hypertension      Past Surgical History:   Procedure Laterality Date   • ANKLE SURGERY     • CHOLECYSTECTOMY     • DILATATION AND CURETTAGE     • TOTAL KNEE ARTHROPLASTY Left 11/6/2019    Procedure: TOTAL KNEE REPLACEMENT;  Surgeon: Rashaad Capps MD;  Location: [facility];  Service: Orthopedics     Family History   Problem Relation Age of Onset   • Heart attack Mother    • Cancer Father         Lung   • No Known Problems Maternal Grandmother    • No Known Problems Maternal Grandfather    • No Known Problems Paternal Grandmother    • No Known Problems Paternal Grandfather       reports that she has never smoked. She has never used smokeless tobacco. She reports that she does not drink alcohol or use drugs.      Current Outpatient Medications:   •  alendronate " "(FOSAMAX) 70 MG tablet, Take 1 tablet by mouth Every 7 (Seven) Days., Disp: 12 tablet, Rfl: 3  •  aspirin 81 MG EC tablet, Take 1 tablet by mouth Daily., Disp: 30 tablet, Rfl: 0  •  cetirizine (zyrTEC) 10 MG tablet, Take 10 mg by mouth Daily., Disp: , Rfl:   •  citalopram (CeleXA) 20 MG tablet, Take 1 tablet by mouth once daily, Disp: 90 tablet, Rfl: 3  •  Cranberry 125 MG tablet, Take 1 tablet by mouth Daily., Disp: , Rfl:   •  famotidine (PEPCID) 20 MG tablet, TAKE 2 TABLETS BY MOUTH AT BEDTIME, Disp: 180 tablet, Rfl: 3  •  indapamide (LOZOL) 2.5 MG tablet, TAKE 1 TABLET BY MOUTH ON MONDAY, WEDNESDAY AND FRIDAY, Disp: 36 tablet, Rfl: 3  •  lisinopril (PRINIVIL,ZESTRIL) 10 MG tablet, Take 1 tablet by mouth once daily, Disp: 90 tablet, Rfl: 3  •  Multiple Vitamins-Minerals (MULTIVITAMIN WITH MINERALS) tablet tablet, Take 1 tablet by mouth Daily., Disp: , Rfl:   •  simvastatin (ZOCOR) 20 MG tablet, TAKE 1 TABLET BY MOUTH ONCE DAILY IN THE EVENING, Disp: 90 tablet, Rfl: 2    OBJECTIVE:  /79 (BP Location: Left arm, Patient Position: Sitting, Cuff Size: Adult)   Pulse 72   Temp 98.4 °F (36.9 °C) (Temporal)   Ht 160 cm (63\")   Wt 92.8 kg (204 lb 9.6 oz)   LMP  (LMP Unknown)   SpO2 99%   Breastfeeding No   BMI 36.24 kg/m²    Physical Exam  Vitals signs reviewed.   Constitutional:       Appearance: She is well-developed. She is obese.   Cardiovascular:      Rate and Rhythm: Normal rate and regular rhythm.      Heart sounds: Normal heart sounds.   Pulmonary:      Effort: Pulmonary effort is normal.      Breath sounds: Normal breath sounds.   Abdominal:      General: Bowel sounds are normal. There is no distension.      Palpations: Abdomen is soft. There is no mass.      Tenderness: There is no abdominal tenderness. There is no guarding.   Neurological:      Mental Status: She is alert and oriented to person, place, and time.   Psychiatric:         Behavior: Behavior normal.         Assessment/Plan    Diagnoses " "and all orders for this visit:    1. Abdominal pain, unspecified abdominal location (Primary)  -     CBC & Differential  -     Comprehensive Metabolic Panel  -     Amylase  -     Lipase  -     Vitamin B12  -     Folate  -     COVID-19,[facility] ROUTINE, NP/OP SWAB IN TRANSPORT MEDIA OR ESWAB 72 HR TAT - Swab, Oropharynx    2. Loose stools    3. Memory changes    Evaluate labs.   Patient advised to use Imodium.  If no improvement in one to two weeks will recommend GI PCR and then referral to GI for likely colonoscopy.    In regards to memory - advised that if the only difficulty was with last names that this would not warrant a specific \"memory medicine.\"  However, I would evaluate labs for any abnormalities that may cause changes.    An After Visit Summary was printed and given to the patient at discharge.  Return if symptoms worsen or fail to improve, for Next scheduled follow up.       Chely Miranda, EUGENE 3/4/21    Electronically signed.  "

## 2021-03-05 LAB
ALBUMIN SERPL-MCNC: 4.4 G/DL (ref 3.5–5.2)
ALBUMIN/GLOB SERPL: 2 G/DL
ALP SERPL-CCNC: 52 U/L (ref 39–117)
ALT SERPL-CCNC: 7 U/L (ref 1–33)
AMYLASE SERPL-CCNC: 74 U/L (ref 28–100)
AST SERPL-CCNC: 14 U/L (ref 1–32)
BASOPHILS # BLD AUTO: 0.07 10*3/MM3 (ref 0–0.2)
BASOPHILS NFR BLD AUTO: 1.1 % (ref 0–1.5)
BILIRUB SERPL-MCNC: 0.2 MG/DL (ref 0–1.2)
BUN SERPL-MCNC: 22 MG/DL (ref 8–23)
BUN/CREAT SERPL: 19.6 (ref 7–25)
CALCIUM SERPL-MCNC: 9.4 MG/DL (ref 8.6–10.5)
CHLORIDE SERPL-SCNC: 101 MMOL/L (ref 98–107)
CO2 SERPL-SCNC: 25.8 MMOL/L (ref 22–29)
CREAT SERPL-MCNC: 1.12 MG/DL (ref 0.57–1)
EOSINOPHIL # BLD AUTO: 0.23 10*3/MM3 (ref 0–0.4)
EOSINOPHIL NFR BLD AUTO: 3.6 % (ref 0.3–6.2)
ERYTHROCYTE [DISTWIDTH] IN BLOOD BY AUTOMATED COUNT: 12 % (ref 12.3–15.4)
FOLATE SERPL-MCNC: >20 NG/ML (ref 4.78–24.2)
GLOBULIN SER CALC-MCNC: 2.2 GM/DL
GLUCOSE SERPL-MCNC: 100 MG/DL (ref 65–99)
HCT VFR BLD AUTO: 39.4 % (ref 34–46.6)
HGB BLD-MCNC: 13.1 G/DL (ref 12–15.9)
IMM GRANULOCYTES # BLD AUTO: 0.02 10*3/MM3 (ref 0–0.05)
IMM GRANULOCYTES NFR BLD AUTO: 0.3 % (ref 0–0.5)
LIPASE SERPL-CCNC: 35 U/L (ref 13–60)
LYMPHOCYTES # BLD AUTO: 2.35 10*3/MM3 (ref 0.7–3.1)
LYMPHOCYTES NFR BLD AUTO: 37.1 % (ref 19.6–45.3)
MCH RBC QN AUTO: 32.7 PG (ref 26.6–33)
MCHC RBC AUTO-ENTMCNC: 33.2 G/DL (ref 31.5–35.7)
MCV RBC AUTO: 98.3 FL (ref 79–97)
MONOCYTES # BLD AUTO: 0.71 10*3/MM3 (ref 0.1–0.9)
MONOCYTES NFR BLD AUTO: 11.2 % (ref 5–12)
NEUTROPHILS # BLD AUTO: 2.95 10*3/MM3 (ref 1.7–7)
NEUTROPHILS NFR BLD AUTO: 46.7 % (ref 42.7–76)
NRBC BLD AUTO-RTO: 0 /100 WBC (ref 0–0.2)
PLATELET # BLD AUTO: 238 10*3/MM3 (ref 140–450)
POTASSIUM SERPL-SCNC: 4.1 MMOL/L (ref 3.5–5.2)
PROT SERPL-MCNC: 6.6 G/DL (ref 6–8.5)
RBC # BLD AUTO: 4.01 10*6/MM3 (ref 3.77–5.28)
SARS-COV-2 RNA RESP QL NAA+PROBE: NOT DETECTED
SODIUM SERPL-SCNC: 140 MMOL/L (ref 136–145)
VIT B12 SERPL-MCNC: 1080 PG/ML (ref 211–946)
WBC # BLD AUTO: 6.33 10*3/MM3 (ref 3.4–10.8)

## 2021-03-05 NOTE — PROGRESS NOTES
Labs are all fine. If no improvement symptoms in one week, let us know and we will get a stool panel. If that shows no infection will need GI referral.

## 2021-05-10 RX ORDER — LISINOPRIL 10 MG/1
TABLET ORAL
Qty: 90 TABLET | Refills: 0 | Status: SHIPPED | OUTPATIENT
Start: 2021-05-10 | End: 2021-11-15

## 2021-05-11 ENCOUNTER — OFFICE VISIT (OUTPATIENT)
Dept: FAMILY MEDICINE CLINIC | Facility: CLINIC | Age: 81
End: 2021-05-11

## 2021-05-11 VITALS
RESPIRATION RATE: 16 BRPM | TEMPERATURE: 97.7 F | HEIGHT: 63 IN | OXYGEN SATURATION: 98 % | SYSTOLIC BLOOD PRESSURE: 132 MMHG | WEIGHT: 204 LBS | BODY MASS INDEX: 36.14 KG/M2 | HEART RATE: 75 BPM | DIASTOLIC BLOOD PRESSURE: 74 MMHG

## 2021-05-11 DIAGNOSIS — Z12.31 BREAST CANCER SCREENING BY MAMMOGRAM: Primary | ICD-10-CM

## 2021-05-11 DIAGNOSIS — R53.83 FATIGUE, UNSPECIFIED TYPE: ICD-10-CM

## 2021-05-11 DIAGNOSIS — E66.01 MORBIDLY OBESE (HCC): ICD-10-CM

## 2021-05-11 DIAGNOSIS — E78.2 MIXED HYPERLIPIDEMIA: ICD-10-CM

## 2021-05-11 DIAGNOSIS — Z78.0 POST-MENOPAUSAL: ICD-10-CM

## 2021-05-11 DIAGNOSIS — Z12.4 SCREENING FOR MALIGNANT NEOPLASM OF THE CERVIX: ICD-10-CM

## 2021-05-11 DIAGNOSIS — I10 ESSENTIAL HYPERTENSION: ICD-10-CM

## 2021-05-11 LAB
BILIRUB BLD-MCNC: NEGATIVE MG/DL
CLARITY, POC: CLEAR
COLOR UR: YELLOW
GLUCOSE UR STRIP-MCNC: NEGATIVE MG/DL
KETONES UR QL: NEGATIVE
LEUKOCYTE EST, POC: NEGATIVE
NITRITE UR-MCNC: NEGATIVE MG/ML
PH UR: 6 [PH] (ref 5–8)
PROT UR STRIP-MCNC: NEGATIVE MG/DL
RBC # UR STRIP: NEGATIVE /UL
SP GR UR: 1.02 (ref 1–1.03)
UROBILINOGEN UR QL: NORMAL

## 2021-05-11 PROCEDURE — G0439 PPPS, SUBSEQ VISIT: HCPCS | Performed by: FAMILY MEDICINE

## 2021-05-11 PROCEDURE — 1170F FXNL STATUS ASSESSED: CPT | Performed by: FAMILY MEDICINE

## 2021-05-11 PROCEDURE — 1160F RVW MEDS BY RX/DR IN RCRD: CPT | Performed by: FAMILY MEDICINE

## 2021-05-11 PROCEDURE — 81003 URINALYSIS AUTO W/O SCOPE: CPT | Performed by: FAMILY MEDICINE

## 2021-05-11 NOTE — PROGRESS NOTES
The ABCs of the Annual Wellness Visit  Subsequent Medicare Wellness Visit    Chief Complaint   Patient presents with   • Medicare Wellness-subsequent     Fasting with PAP       Subjective   History of Present Illness:  Hailey Dickerson is a 80 y.o. female who presents for a Subsequent Medicare Wellness Visit.    HEALTH RISK ASSESSMENT    Recent Hospitalizations:  No hospitalization(s) within the last year.    Current Medical Providers:  Patient Care Team:  Titi Cotton MD as PCP - General (Family Medicine)    Smoking Status:  Social History     Tobacco Use   Smoking Status Never Smoker   Smokeless Tobacco Never Used       Alcohol Consumption:  Social History     Substance and Sexual Activity   Alcohol Use No       Depression Screen:   PHQ-2/PHQ-9 Depression Screening 5/11/2021   Little interest or pleasure in doing things 0   Feeling down, depressed, or hopeless 0   Trouble falling or staying asleep, or sleeping too much -   Feeling tired or having little energy -   Poor appetite or overeating -   Feeling bad about yourself - or that you are a failure or have let yourself or your family down -   Trouble concentrating on things, such as reading the newspaper or watching television -   Moving or speaking so slowly that other people could have noticed. Or the opposite - being so fidgety or restless that you have been moving around a lot more than usual -   Thoughts that you would be better off dead, or of hurting yourself in some way -   Total Score 0   If you checked off any problems, how difficult have these problems made it for you to do your work, take care of things at home, or get along with other people? -       Fall Risk Screen:  STEADI Fall Risk Assessment was completed, and patient is at LOW risk for falls.Assessment completed on:5/11/2021    Health Habits and Functional and Cognitive Screening:  Functional & Cognitive Status 5/11/2021   Do you have difficulty preparing food and eating? No   Do you  have difficulty bathing yourself, getting dressed or grooming yourself? No   Do you have difficulty using the toilet? No   Do you have difficulty moving around from place to place? No   Do you have trouble with steps or getting out of a bed or a chair? No   Current Diet Unhealthy Diet   Dental Exam Not up to date   Eye Exam Not up to date   Exercise (times per week) 3 times per week   Current Exercise Activities Include Walking   Do you need help using the phone?  No   Are you deaf or do you have serious difficulty hearing?  No   Do you need help with transportation? No   Do you need help shopping? No   Do you need help preparing meals?  No   Do you need help with housework?  No   Do you need help with laundry? No   Do you need help taking your medications? No   Do you need help managing money? No   Do you ever drive or ride in a car without wearing a seat belt? No   Have you felt unusual stress, anger or loneliness in the last month? No   Who do you live with? Spouse   If you need help, do you have trouble finding someone available to you? No   Have you been bothered in the last four weeks by sexual problems? No   Do you have difficulty concentrating, remembering or making decisions? Yes         Does the patient have evidence of cognitive impairment? Yes    Asprin use counseling:Taking ASA appropriately as indicated    Age-appropriate Screening Schedule:  Refer to the list below for future screening recommendations based on patient's age, sex and/or medical conditions. Orders for these recommended tests are listed in the plan section. The patient has been provided with a written plan.    Health Maintenance   Topic Date Due   • DXA SCAN  Never done   • INFLUENZA VACCINE  08/01/2021   • LIPID PANEL  11/05/2021   • TDAP/TD VACCINES (2 - Td) 12/18/2022   • ZOSTER VACCINE  Discontinued          The following portions of the patient's history were reviewed and updated as appropriate: allergies, current medications, past  family history, past medical history, past social history, past surgical history and problem list.    Outpatient Medications Prior to Visit   Medication Sig Dispense Refill   • alendronate (FOSAMAX) 70 MG tablet Take 1 tablet by mouth Every 7 (Seven) Days. 12 tablet 3   • aspirin 81 MG EC tablet Take 1 tablet by mouth Daily. 30 tablet 0   • cetirizine (zyrTEC) 10 MG tablet Take 10 mg by mouth Daily.     • citalopram (CeleXA) 20 MG tablet Take 1 tablet by mouth once daily 90 tablet 3   • Cranberry 125 MG tablet Take 1 tablet by mouth Daily.     • famotidine (PEPCID) 20 MG tablet TAKE 2 TABLETS BY MOUTH AT BEDTIME 180 tablet 3   • FIBER PO Take  by mouth Daily.     • indapamide (LOZOL) 2.5 MG tablet TAKE 1 TABLET BY MOUTH ON MONDAY, WEDNESDAY AND FRIDAY 36 tablet 3   • lisinopril (PRINIVIL,ZESTRIL) 10 MG tablet Take 1 tablet by mouth once daily 90 tablet 0   • Multiple Vitamins-Minerals (MULTIVITAMIN WITH MINERALS) tablet tablet Take 1 tablet by mouth Daily.     • simvastatin (ZOCOR) 20 MG tablet TAKE 1 TABLET BY MOUTH ONCE DAILY IN THE EVENING 90 tablet 2     No facility-administered medications prior to visit.       Patient Active Problem List   Diagnosis   • Essential hypertension   • Anxiety   • GERD (gastroesophageal reflux disease)   • Primary osteoarthritis of left knee   • Osteoarthritis of left knee   • Mixed hyperlipidemia   • Morbidly obese (CMS/[facility])       Advanced Care Planning:  ACP discussion was declined by the patient. Patient does not have an advance directive, declines further assistance.    Review of Systems   Cardiovascular: Negative for chest pain and leg swelling.        Saw cardiologist last year   Gastrointestinal:        Cologuard -2019   Genitourinary: Negative for difficulty urinating.        Sees urologist yearly   Musculoskeletal: Positive for arthralgias.   All other systems reviewed and are negative.      Compared to one year ago, the patient feels her physical health is the  "same.  Compared to one year ago, the patient feels her mental health is the same.    Reviewed chart for potential of high risk medication in the elderly: yes  Reviewed chart for potential of harmful drug interactions in the elderly:yes    Objective         Vitals:    05/11/21 0901   BP: 132/74   BP Location: Left arm   Patient Position: Sitting   Cuff Size: Large Adult   Pulse: 75   Resp: 16   Temp: 97.7 °F (36.5 °C)   TempSrc: Temporal   SpO2: 98%   Weight: 92.5 kg (204 lb)   Height: 160 cm (63\")   PainSc: 0-No pain       Body mass index is 36.14 kg/m².  Discussed the patient's BMI with her. The BMI is above average; no BMI management plan is appropriate..    Physical Exam  Vitals and nursing note reviewed.   Constitutional:       Appearance: Normal appearance. She is obese.   HENT:      Right Ear: Tympanic membrane and ear canal normal.      Left Ear: Tympanic membrane and ear canal normal.   Eyes:      Extraocular Movements: Extraocular movements intact.      Pupils: Pupils are equal, round, and reactive to light.   Neck:      Vascular: No carotid bruit.   Cardiovascular:      Rate and Rhythm: Normal rate and regular rhythm.      Pulses: Normal pulses.      Heart sounds: Normal heart sounds.   Pulmonary:      Effort: Pulmonary effort is normal.      Breath sounds: Normal breath sounds.      Comments: Breast no masses noted  Abdominal:      General: Abdomen is flat. There is no distension.      Palpations: Abdomen is soft. There is no mass.   Genitourinary:     General: Normal vulva.      Comments: Cervix present Pap smear taken difficult exam uterus felt to be normal size no adnexal masses  Musculoskeletal:      Right lower leg: No edema.      Left lower leg: No edema.   Lymphadenopathy:      Cervical: No cervical adenopathy.   Skin:     General: Skin is warm and dry.      Capillary Refill: Capillary refill takes less than 2 seconds.      Comments: Seborrheic keratosis 1 cm right axillary area   Neurological:     "  General: No focal deficit present.      Mental Status: She is alert and oriented to person, place, and time.   Psychiatric:         Mood and Affect: Mood normal.         Behavior: Behavior normal.         Thought Content: Thought content normal.         Judgment: Judgment normal.         Lab Results   Component Value Date     (H) 03/04/2021        Assessment/Plan   Medicare Risks and Personalized Health Plan  CMS Preventative Services Quick Reference  Breast Cancer/Mammogram Screening  Fall Risk    The above risks/problems have been discussed with the patient.  Pertinent information has been shared with the patient in the After Visit Summary.  Follow up plans and orders are seen below in the Assessment/Plan Section.    Diagnoses and all orders for this visit:    1. Breast cancer screening by mammogram (Primary)  -     Mammo Screening Digital Tomosynthesis Bilateral With CAD; Future    2. Screening for malignant neoplasm of the cervix  -     Pap IG (Image Guided)    3. Post-menopausal  -     DEXA Bone Density Axial; Future    4. Fatigue, unspecified type  -     TSH  -     T4, free  -     CBC & Differential    5. Essential hypertension  -     POC Urinalysis Dipstick, Multipro    6. Mixed hyperlipidemia  -     Comprehensive Metabolic Panel  -     Lipid Panel    7. Morbidly obese (CMS/[facility])      Follow Up:  Return in 6 months (on 11/11/2021) for Annual physical.     An After Visit Summary and PPPS were given to the patient.       Had COVID-19 vaccinations-above

## 2021-05-11 NOTE — PATIENT INSTRUCTIONS
Exercising to Stay Healthy  To become healthy and stay healthy, it is recommended that you do moderate-intensity and vigorous-intensity exercise. You can tell that you are exercising at a moderate intensity if your heart starts beating faster and you start breathing faster but can still hold a conversation. You can tell that you are exercising at a vigorous intensity if you are breathing much harder and faster and cannot hold a conversation while exercising.  Exercising regularly is important. It has many health benefits, such as:  · Improving overall fitness, flexibility, and endurance.  · Increasing bone density.  · Helping with weight control.  · Decreasing body fat.  · Increasing muscle strength.  · Reducing stress and tension.  · Improving overall health.  How often should I exercise?  Choose an activity that you enjoy, and set realistic goals. Your health care provider can help you make an activity plan that works for you.  Exercise regularly as told by your health care provider. This may include:  · Doing strength training two times a week, such as:  ? Lifting weights.  ? Using resistance bands.  ? Push-ups.  ? Sit-ups.  ? Yoga.  · Doing a certain intensity of exercise for a given amount of time. Choose from these options:  ? A total of 150 minutes of moderate-intensity exercise every week.  ? A total of 75 minutes of vigorous-intensity exercise every week.  ? A mix of moderate-intensity and vigorous-intensity exercise every week.  Children, pregnant women, people who have not exercised regularly, people who are overweight, and older adults may need to talk with a health care provider about what activities are safe to do. If you have a medical condition, be sure to talk with your health care provider before you start a new exercise program.  What are some exercise ideas?  Moderate-intensity exercise ideas include:  · Walking 1 mile (1.6 km) in about 15  minutes.  · Biking.  · Hiking.  · Golfing.  · Dancing.  · Water aerobics.  Vigorous-intensity exercise ideas include:  · Walking 4.5 miles (7.2 km) or more in about 1 hour.  · Jogging or running 5 miles (8 km) in about 1 hour.  · Biking 10 miles (16.1 km) or more in about 1 hour.  · Lap swimming.  · Roller-skating or in-line skating.  · Cross-country skiing.  · Vigorous competitive sports, such as football, basketball, and soccer.  · Jumping rope.  · Aerobic dancing.  What are some everyday activities that can help me to get exercise?  · Yard work, such as:  ? Pushing a .  ? Raking and bagging leaves.  · Washing your car.  · Pushing a stroller.  · Shoveling snow.  · Gardening.  · Washing windows or floors.  How can I be more active in my day-to-day activities?  · Use stairs instead of an elevator.  · Take a walk during your lunch break.  · If you drive, park your car farther away from your work or school.  · If you take public transportation, get off one stop early and walk the rest of the way.  · Stand up or walk around during all of your indoor phone calls.  · Get up, stretch, and walk around every 30 minutes throughout the day.  · Enjoy exercise with a friend. Support to continue exercising will help you keep a regular routine of activity.  What guidelines can I follow while exercising?  · Before you start a new exercise program, talk with your health care provider.  · Do not exercise so much that you hurt yourself, feel dizzy, or get very short of breath.  · Wear comfortable clothes and wear shoes with good support.  · Drink plenty of water while you exercise to prevent dehydration or heat stroke.  · Work out until your breathing and your heartbeat get faster.  Where to find more information  · [place] Department of Health and Human Services: www.hhs.gov  · Centers for Disease Control and Prevention (CDC): www.cdc.gov  Summary  · Exercising regularly is important. It will improve your overall fitness,  flexibility, and endurance.  · Regular exercise also will improve your overall health. It can help you control your weight, reduce stress, and improve your bone density.  · Do not exercise so much that you hurt yourself, feel dizzy, or get very short of breath.  · Before you start a new exercise program, talk with your health care provider.  This information is not intended to replace advice given to you by your health care provider. Make sure you discuss any questions you have with your health care provider.  Document Revised: 11/30/2018 Document Reviewed: 11/08/2018  Elsevier Patient Education © 2021 [employer]      Fall Prevention in the Home, Adult  Falls can cause injuries and can affect people from all age groups. There are many simple things that you can do to make your home safe and to help prevent falls. Ask for help when making these changes, if needed.  What actions can I take to prevent falls?  General instructions  · Use good lighting in all rooms. Replace any light bulbs that burn out.  · Turn on lights if it is dark. Use night-lights.  · Place frequently used items in easy-to-reach places. Lower the shelves around your home if necessary.  · Set up furniture so that there are clear paths around it. Avoid moving your furniture around.  · Remove throw rugs and other tripping hazards from the floor.  · Avoid walking on wet floors.  · Fix any uneven floor surfaces.  · Add color or contrast paint or tape to grab bars and handrails in your home. Place contrasting color strips on the first and last steps of stairways.  · When you use a stepladder, make sure that it is completely opened and that the sides are firmly locked. Have someone hold the ladder while you are using it. Do not climb a closed stepladder.  · Be aware of any and all pets.  What can I do in the bathroom?         · Keep the floor dry. Immediately clean up any water that spills onto the floor.  · Remove soap buildup in the tub or shower on  a regular basis.  · Use non-skid mats or decals on the floor of the tub or shower.  · Attach bath mats securely with double-sided, non-slip rug tape.  · If you need to sit down while you are in the shower, use a plastic, non-slip stool.  · Install grab bars by the toilet and in the tub and shower. Do not use towel bars as grab bars.  What can I do in the bedroom?  · Make sure that a bedside light is easy to reach.  · Do not use oversized bedding that drapes onto the floor.  · Have a firm chair that has side arms to use for getting dressed.  What can I do in the kitchen?  · Clean up any spills right away.  · If you need to reach for something above you, use a sturdy step stool that has a grab bar.  · Keep electrical cables out of the way.  · Do not use floor polish or wax that makes floors slippery. If you must use wax, make sure that it is non-skid floor wax.  What can I do in the stairways?  · Do not leave any items on the stairs.  · Make sure that you have a light switch at the top of the stairs and the bottom of the stairs. Have them installed if you do not have them.  · Make sure that there are handrails on both sides of the stairs. Fix handrails that are broken or loose. Make sure that handrails are as long as the stairways.  · Install non-slip stair treads on all stairs in your home.  · Avoid having throw rugs at the top or bottom of stairways, or secure the rugs with carpet tape to prevent them from moving.  · Choose a carpet design that does not hide the edge of steps on the stairway.  · Check any carpeting to make sure that it is firmly attached to the stairs. Fix any carpet that is loose or worn.  What can I do on the outside of my home?  · Use bright outdoor lighting.  · Regularly repair the edges of walkways and driveways and fix any cracks.  · Remove high doorway thresholds.  · Trim any shrubbery on the main path into your home.  · Regularly check that handrails are securely fastened and in good repair.  Both sides of any steps should have handrails.  · Install guardrails along the edges of any raised decks or porches.  · Clear walkways of debris and clutter, including tools and rocks.  · Have leaves, snow, and ice cleared regularly.  · Use sand or salt on walkways during winter months.  · In the garage, clean up any spills right away, including grease or oil spills.  What other actions can I take?  · Wear closed-toe shoes that fit well and support your feet. Wear shoes that have rubber soles or low heels.  · Use mobility aids as needed, such as canes, walkers, scooters, and crutches.  · Review your medicines with your health care provider. Some medicines can cause dizziness or changes in blood pressure, which increase your risk of falling.  Talk with your health care provider about other ways that you can decrease your risk of falls. This may include working with a physical therapist or  to improve your strength, balance, and endurance.  Where to find more information  · Centers for Disease Control and Prevention, STEADI: https://www.cdc.gov  · [place] on Aging: https://gw3bkia.merari.nih.gov  Contact a health care provider if:  · You are afraid of falling at home.  · You feel weak, drowsy, or dizzy at home.  · You fall at home.  Summary  · There are many simple things that you can do to make your home safe and to help prevent falls.  · Ways to make your home safe include removing tripping hazards and installing grab bars in the bathroom.  · Ask for help when making these changes in your home.  This information is not intended to replace advice given to you by your health care provider. Make sure you discuss any questions you have with your health care provider.  Document Revised: 11/30/2018 Document Reviewed: 08/02/2018  [employer] Patient Education © 2021 [employer]    Medicare Wellness  Personal Prevention Plan of Service     Date of Office Visit:  05/11/2021  Encounter Provider:  Titi Saldivar  MD Yury  Place of Service:  [facility]  Patient Name: Hailey Dickerson  :  1940    As part of the Medicare Wellness portion of your visit today, we are providing you with this personalized preventive plan of services (PPPS). This plan is based upon recommendations of the [place] Preventive Services Task Force (USPSTF) and the Advisory Committee on Immunization Practices (ACIP).    This lists the preventive care services that should be considered, and provides dates of when you are due. Items listed as completed are up-to-date and do not require any further intervention.    Health Maintenance   Topic Date Due   • DXA SCAN  Never done   • INFLUENZA VACCINE  2021   • LIPID PANEL  2021   • ANNUAL WELLNESS VISIT  2022   • TDAP/TD VACCINES (2 - Td) 2022   • COLORECTAL CANCER SCREENING  2023   • COVID-19 Vaccine  Completed   • Pneumococcal Vaccine 65+  Completed   • ZOSTER VACCINE  Discontinued       No orders of the defined types were placed in this encounter.      Return in 6 months (on 2021).

## 2021-05-12 LAB
ALBUMIN SERPL-MCNC: 4.5 G/DL (ref 3.5–5.2)
ALBUMIN/GLOB SERPL: 1.9 G/DL
ALP SERPL-CCNC: 54 U/L (ref 39–117)
ALT SERPL-CCNC: 10 U/L (ref 1–33)
AST SERPL-CCNC: 13 U/L (ref 1–32)
BASOPHILS # BLD AUTO: ABNORMAL 10*3/UL
BASOPHILS # BLD MANUAL: 0.12 10*3/MM3 (ref 0–0.2)
BASOPHILS NFR BLD MANUAL: 2 % (ref 0–1.5)
BILIRUB SERPL-MCNC: 0.3 MG/DL (ref 0–1.2)
BUN SERPL-MCNC: 23 MG/DL (ref 8–23)
BUN/CREAT SERPL: 22.1 (ref 7–25)
CALCIUM SERPL-MCNC: 9.6 MG/DL (ref 8.6–10.5)
CHLORIDE SERPL-SCNC: 98 MMOL/L (ref 98–107)
CHOLEST SERPL-MCNC: 172 MG/DL (ref 0–200)
CO2 SERPL-SCNC: 25.2 MMOL/L (ref 22–29)
CREAT SERPL-MCNC: 1.04 MG/DL (ref 0.57–1)
CYTOLOGIST CVX/VAG CYTO: NORMAL
CYTOLOGY CVX/VAG DOC CYTO: NORMAL
CYTOLOGY CVX/VAG DOC THIN PREP: NORMAL
DIFFERENTIAL COMMENT: ABNORMAL
DX ICD CODE: NORMAL
EOSINOPHIL # BLD AUTO: ABNORMAL 10*3/UL
EOSINOPHIL NFR BLD AUTO: ABNORMAL %
ERYTHROCYTE [DISTWIDTH] IN BLOOD BY AUTOMATED COUNT: 11.9 % (ref 12.3–15.4)
GLOBULIN SER CALC-MCNC: 2.4 GM/DL
GLUCOSE SERPL-MCNC: 88 MG/DL (ref 65–99)
HCT VFR BLD AUTO: 40.3 % (ref 34–46.6)
HDLC SERPL-MCNC: 49 MG/DL (ref 40–60)
HGB BLD-MCNC: 13.5 G/DL (ref 12–15.9)
HIV 1 & 2 AB SER-IMP: NORMAL
LDLC SERPL CALC-MCNC: 95 MG/DL (ref 0–100)
LYMPHOCYTES # BLD AUTO: ABNORMAL 10*3/UL
LYMPHOCYTES # BLD MANUAL: 1.92 10*3/MM3 (ref 0.7–3.1)
LYMPHOCYTES NFR BLD AUTO: ABNORMAL %
LYMPHOCYTES NFR BLD MANUAL: 32.3 % (ref 19.6–45.3)
MCH RBC QN AUTO: 32.1 PG (ref 26.6–33)
MCHC RBC AUTO-ENTMCNC: 33.5 G/DL (ref 31.5–35.7)
MCV RBC AUTO: 95.7 FL (ref 79–97)
MONOCYTES # BLD MANUAL: 0.54 10*3/MM3 (ref 0.1–0.9)
MONOCYTES NFR BLD AUTO: ABNORMAL %
MONOCYTES NFR BLD MANUAL: 9.1 % (ref 5–12)
NEUTROPHILS # BLD MANUAL: 3.36 10*3/MM3 (ref 1.7–7)
NEUTROPHILS NFR BLD AUTO: ABNORMAL %
NEUTROPHILS NFR BLD MANUAL: 56.6 % (ref 42.7–76)
OTHER STN SPEC: NORMAL
PLATELET # BLD AUTO: 272 10*3/MM3 (ref 140–450)
PLATELET BLD QL SMEAR: ABNORMAL
POTASSIUM SERPL-SCNC: 4.4 MMOL/L (ref 3.5–5.2)
PROT SERPL-MCNC: 6.9 G/DL (ref 6–8.5)
RBC # BLD AUTO: 4.21 10*6/MM3 (ref 3.77–5.28)
RBC MORPH BLD: ABNORMAL
SODIUM SERPL-SCNC: 137 MMOL/L (ref 136–145)
STAT OF ADQ CVX/VAG CYTO-IMP: NORMAL
T4 FREE SERPL-MCNC: 1.23 NG/DL (ref 0.93–1.7)
TRIGL SERPL-MCNC: 161 MG/DL (ref 0–150)
TSH SERPL DL<=0.005 MIU/L-ACNC: 2.45 UIU/ML (ref 0.27–4.2)
VLDLC SERPL CALC-MCNC: 28 MG/DL (ref 5–40)
WBC # BLD AUTO: 5.94 10*3/MM3 (ref 3.4–10.8)

## 2021-05-24 ENCOUNTER — TELEPHONE (OUTPATIENT)
Dept: FAMILY MEDICINE CLINIC | Facility: CLINIC | Age: 81
End: 2021-05-24

## 2021-05-24 NOTE — TELEPHONE ENCOUNTER
Caller: Alanna/Don [facility]    Relationship to patient:     Best call back number: [phone]    Patient is needing: Patient did not have Dexa Scan done. She will have it at Dr. Ng.

## 2021-06-21 RX ORDER — FAMOTIDINE 20 MG/1
TABLET, FILM COATED ORAL
Qty: 180 TABLET | Refills: 2 | Status: SHIPPED | OUTPATIENT
Start: 2021-06-21 | End: 2022-03-21

## 2021-06-28 RX ORDER — INDAPAMIDE 2.5 MG/1
TABLET, FILM COATED ORAL
Qty: 36 TABLET | Refills: 3 | Status: SHIPPED | OUTPATIENT
Start: 2021-06-28 | End: 2022-06-06

## 2021-07-06 RX ORDER — SIMVASTATIN 20 MG
TABLET ORAL
Qty: 90 TABLET | Refills: 3 | Status: SHIPPED | OUTPATIENT
Start: 2021-07-06 | End: 2022-05-19 | Stop reason: SDUPTHER

## 2021-08-02 RX ORDER — CITALOPRAM 20 MG/1
TABLET ORAL
Qty: 90 TABLET | Refills: 4 | Status: SHIPPED | OUTPATIENT
Start: 2021-08-02 | End: 2022-10-31

## 2021-11-11 ENCOUNTER — OFFICE VISIT (OUTPATIENT)
Dept: FAMILY MEDICINE CLINIC | Facility: CLINIC | Age: 81
End: 2021-11-11

## 2021-11-11 VITALS
BODY MASS INDEX: 35.86 KG/M2 | SYSTOLIC BLOOD PRESSURE: 124 MMHG | TEMPERATURE: 97.8 F | WEIGHT: 202.4 LBS | RESPIRATION RATE: 16 BRPM | HEIGHT: 63 IN | DIASTOLIC BLOOD PRESSURE: 80 MMHG | OXYGEN SATURATION: 97 % | HEART RATE: 82 BPM

## 2021-11-11 DIAGNOSIS — E78.2 MIXED HYPERLIPIDEMIA: Primary | ICD-10-CM

## 2021-11-11 PROCEDURE — 99213 OFFICE O/P EST LOW 20 MIN: CPT | Performed by: FAMILY MEDICINE

## 2021-11-11 NOTE — PATIENT INSTRUCTIONS
Exercising to Stay Healthy  To become healthy and stay healthy, it is recommended that you do moderate-intensity and vigorous-intensity exercise. You can tell that you are exercising at a moderate intensity if your heart starts beating faster and you start breathing faster but can still hold a conversation. You can tell that you are exercising at a vigorous intensity if you are breathing much harder and faster and cannot hold a conversation while exercising.  Exercising regularly is important. It has many health benefits, such as:  · Improving overall fitness, flexibility, and endurance.  · Increasing bone density.  · Helping with weight control.  · Decreasing body fat.  · Increasing muscle strength.  · Reducing stress and tension.  · Improving overall health.  How often should I exercise?  Choose an activity that you enjoy, and set realistic goals. Your health care provider can help you make an activity plan that works for you.  Exercise regularly as told by your health care provider. This may include:  · Doing strength training two times a week, such as:  ? Lifting weights.  ? Using resistance bands.  ? Push-ups.  ? Sit-ups.  ? Yoga.  · Doing a certain intensity of exercise for a given amount of time. Choose from these options:  ? A total of 150 minutes of moderate-intensity exercise every week.  ? A total of 75 minutes of vigorous-intensity exercise every week.  ? A mix of moderate-intensity and vigorous-intensity exercise every week.  Children, pregnant women, people who have not exercised regularly, people who are overweight, and older adults may need to talk with a health care provider about what activities are safe to do. If you have a medical condition, be sure to talk with your health care provider before you start a new exercise program.  What are some exercise ideas?  Moderate-intensity exercise ideas include:  · Walking 1 mile (1.6 km) in about 15  minutes.  · Biking.  · Hiking.  · Golfing.  · Dancing.  · Water aerobics.  Vigorous-intensity exercise ideas include:  · Walking 4.5 miles (7.2 km) or more in about 1 hour.  · Jogging or running 5 miles (8 km) in about 1 hour.  · Biking 10 miles (16.1 km) or more in about 1 hour.  · Lap swimming.  · Roller-skating or in-line skating.  · Cross-country skiing.  · Vigorous competitive sports, such as football, basketball, and soccer.  · Jumping rope.  · Aerobic dancing.  What are some everyday activities that can help me to get exercise?  · Yard work, such as:  ? Pushing a .  ? Raking and bagging leaves.  · Washing your car.  · Pushing a stroller.  · Shoveling snow.  · Gardening.  · Washing windows or floors.  How can I be more active in my day-to-day activities?  · Use stairs instead of an elevator.  · Take a walk during your lunch break.  · If you drive, park your car farther away from your work or school.  · If you take public transportation, get off one stop early and walk the rest of the way.  · Stand up or walk around during all of your indoor phone calls.  · Get up, stretch, and walk around every 30 minutes throughout the day.  · Enjoy exercise with a friend. Support to continue exercising will help you keep a regular routine of activity.  What guidelines can I follow while exercising?  · Before you start a new exercise program, talk with your health care provider.  · Do not exercise so much that you hurt yourself, feel dizzy, or get very short of breath.  · Wear comfortable clothes and wear shoes with good support.  · Drink plenty of water while you exercise to prevent dehydration or heat stroke.  · Work out until your breathing and your heartbeat get faster.  Where to find more information  · [place] Department of Health and Human Services: www.hhs.gov  · Centers for Disease Control and Prevention (CDC): www.cdc.gov  Summary  · Exercising regularly is important. It will improve your overall fitness,  flexibility, and endurance.  · Regular exercise also will improve your overall health. It can help you control your weight, reduce stress, and improve your bone density.  · Do not exercise so much that you hurt yourself, feel dizzy, or get very short of breath.  · Before you start a new exercise program, talk with your health care provider.  This information is not intended to replace advice given to you by your health care provider. Make sure you discuss any questions you have with your health care provider.  Document Revised: 11/30/2018 Document Reviewed: 11/08/2018  Elsevier Patient Education © 2021 [employer]

## 2021-11-11 NOTE — PROGRESS NOTES
Markie Dickerson is a 81 y.o. female.     81-year-old female with hyperlipidemia      The following portions of the patient's history were reviewed and updated as appropriate: allergies, current medications, past family history, past medical history, past social history, past surgical history and problem list.    Review of Systems   Respiratory: Negative for shortness of breath.    Cardiovascular: Negative for chest pain and leg swelling.        Recent cardiovascular work-up negative   Genitourinary: Negative for difficulty urinating.   Musculoskeletal: Negative for arthralgias and back pain.        Patient has noticed that since the rehab center close she is not strong physically active etc.-start walking if not better will get physical therapy advised customized aerobic exercise program       Objective   Physical Exam  Vitals and nursing note reviewed.   Constitutional:       Appearance: She is obese.   Cardiovascular:      Rate and Rhythm: Normal rate and regular rhythm.      Pulses: Normal pulses.      Heart sounds: Normal heart sounds.   Pulmonary:      Effort: Pulmonary effort is normal.      Breath sounds: Normal breath sounds.   Abdominal:      Palpations: Abdomen is soft.   Musculoskeletal:      Right lower leg: No edema.      Left lower leg: No edema.   Skin:     General: Skin is warm and dry.   Neurological:      General: No focal deficit present.      Mental Status: She is alert and oriented to person, place, and time.   Psychiatric:         Mood and Affect: Mood normal.         Behavior: Behavior normal.         Thought Content: Thought content normal.         Judgment: Judgment normal.         Assessment/Plan   Diagnoses and all orders for this visit:    1. Mixed hyperlipidemia (Primary)  -     Comprehensive Metabolic Panel  -     Lipid Panel         Plan above-start moving more if does not get better will send to physical therapy

## 2021-11-12 LAB
ALBUMIN SERPL-MCNC: 4.6 G/DL (ref 3.5–5.2)
ALBUMIN/GLOB SERPL: 1.7 G/DL
ALP SERPL-CCNC: 59 U/L (ref 39–117)
ALT SERPL-CCNC: 10 U/L (ref 1–33)
AST SERPL-CCNC: 20 U/L (ref 1–32)
BILIRUB SERPL-MCNC: 0.3 MG/DL (ref 0–1.2)
BUN SERPL-MCNC: 22 MG/DL (ref 8–23)
BUN/CREAT SERPL: 21 (ref 7–25)
CALCIUM SERPL-MCNC: 9.6 MG/DL (ref 8.6–10.5)
CHLORIDE SERPL-SCNC: 100 MMOL/L (ref 98–107)
CHOLEST SERPL-MCNC: 178 MG/DL (ref 0–200)
CO2 SERPL-SCNC: 28.3 MMOL/L (ref 22–29)
CREAT SERPL-MCNC: 1.05 MG/DL (ref 0.57–1)
GLOBULIN SER CALC-MCNC: 2.7 GM/DL
GLUCOSE SERPL-MCNC: 101 MG/DL (ref 65–99)
HDLC SERPL-MCNC: 46 MG/DL (ref 40–60)
LDLC SERPL CALC-MCNC: 97 MG/DL (ref 0–100)
POTASSIUM SERPL-SCNC: 4.5 MMOL/L (ref 3.5–5.2)
PROT SERPL-MCNC: 7.3 G/DL (ref 6–8.5)
SODIUM SERPL-SCNC: 137 MMOL/L (ref 136–145)
TRIGL SERPL-MCNC: 205 MG/DL (ref 0–150)
VLDLC SERPL CALC-MCNC: 35 MG/DL (ref 5–40)

## 2021-11-15 RX ORDER — LISINOPRIL 10 MG/1
TABLET ORAL
Qty: 90 TABLET | Refills: 1 | Status: SHIPPED | OUTPATIENT
Start: 2021-11-15 | End: 2022-05-23

## 2021-11-15 NOTE — TELEPHONE ENCOUNTER
Rx Refill Note  Requested Prescriptions     Pending Prescriptions Disp Refills   • lisinopril (PRINIVIL,ZESTRIL) 10 MG tablet [Pharmacy Med Name: Lisinopril 10 MG Oral Tablet] 90 tablet 0     Sig: Take 1 tablet by mouth once daily      Last office visit with prescribing clinician: 3/4/2021      Next office visit with prescribing clinician: Visit date not found   CPE done 05/11/2021    {TIP  Please add Last Relevant Lab Date if appropriate: 11/11/2021  {TIP  Is Refill Pharmacy correct?: yes    La Nena Avalos MA  11/15/21, 09:37 CST

## 2022-01-24 ENCOUNTER — OFFICE VISIT (OUTPATIENT)
Dept: FAMILY MEDICINE CLINIC | Facility: CLINIC | Age: 82
End: 2022-01-24

## 2022-01-24 VITALS
DIASTOLIC BLOOD PRESSURE: 80 MMHG | HEIGHT: 63 IN | OXYGEN SATURATION: 97 % | WEIGHT: 203 LBS | TEMPERATURE: 97.5 F | RESPIRATION RATE: 18 BRPM | HEART RATE: 69 BPM | BODY MASS INDEX: 35.97 KG/M2 | SYSTOLIC BLOOD PRESSURE: 144 MMHG

## 2022-01-24 DIAGNOSIS — J02.9 SORE THROAT: Primary | ICD-10-CM

## 2022-01-24 DIAGNOSIS — J01.00 SUBACUTE MAXILLARY SINUSITIS: ICD-10-CM

## 2022-01-24 PROCEDURE — 99213 OFFICE O/P EST LOW 20 MIN: CPT | Performed by: FAMILY MEDICINE

## 2022-01-24 PROCEDURE — 96372 THER/PROPH/DIAG INJ SC/IM: CPT | Performed by: FAMILY MEDICINE

## 2022-01-24 RX ORDER — AZITHROMYCIN 250 MG/1
TABLET, FILM COATED ORAL
Qty: 6 TABLET | Refills: 0 | Status: SHIPPED | OUTPATIENT
Start: 2022-01-24 | End: 2022-03-30

## 2022-01-24 RX ORDER — TRIAMCINOLONE ACETONIDE 40 MG/ML
40 INJECTION, SUSPENSION INTRA-ARTICULAR; INTRAMUSCULAR ONCE
Status: COMPLETED | OUTPATIENT
Start: 2022-01-24 | End: 2022-01-24

## 2022-01-24 RX ADMIN — TRIAMCINOLONE ACETONIDE 40 MG: 40 INJECTION, SUSPENSION INTRA-ARTICULAR; INTRAMUSCULAR at 13:30

## 2022-01-24 NOTE — PROGRESS NOTES
Markie Dickerson is a 81 y.o. female.     81-year-old female with cough sore throat sinus congestion      The following portions of the patient's history were reviewed and updated as appropriate: allergies, current medications, past family history, past medical history, past social history, past surgical history and problem list.    Review of Systems   HENT: Positive for postnasal drip, sinus pressure and sore throat.    Respiratory: Positive for cough. Negative for shortness of breath.    Cardiovascular: Negative for chest pain.       Objective   Physical Exam  Vitals and nursing note reviewed.   Constitutional:       Appearance: Normal appearance.   HENT:      Mouth/Throat:      Mouth: Mucous membranes are moist.      Pharynx: Oropharynx is clear.   Cardiovascular:      Rate and Rhythm: Normal rate and regular rhythm.      Pulses: Normal pulses.      Heart sounds: Normal heart sounds.   Pulmonary:      Effort: Pulmonary effort is normal.      Breath sounds: Normal breath sounds. No wheezing or rhonchi.   Musculoskeletal:      Right lower leg: No edema.      Left lower leg: No edema.   Skin:     General: Skin is warm and dry.   Neurological:      General: No focal deficit present.      Mental Status: She is alert and oriented to person, place, and time.   Psychiatric:         Mood and Affect: Mood normal.         Behavior: Behavior normal.         Thought Content: Thought content normal.         Judgment: Judgment normal.         Assessment/Plan   Diagnoses and all orders for this visit:    1. Sore throat (Primary)  -     COVID-19,[facility] ROUTINE, NP/OP SWAB IN TRANSPORT MEDIA OR ESWAB 72 HR TAT - Swab, Anterior nasal    2. Subacute maxillary sinusitis  -     triamcinolone acetonide (KENALOG-40) injection 40 mg    Other orders  -     azithromycin (Zithromax Z-Damion) 250 MG tablet; Take 2 tablets the first day, then 1 tablet daily for 4 days.  Dispense: 6 tablet; Refill: 0       Plan above-completed Covid  vaccinations

## 2022-01-26 LAB
LABCORP SARS-COV-2, NAA 2 DAY TAT: NORMAL
SARS-COV-2 RNA RESP QL NAA+PROBE: DETECTED

## 2022-03-21 RX ORDER — FAMOTIDINE 20 MG/1
TABLET, FILM COATED ORAL
Qty: 180 TABLET | Refills: 0 | Status: SHIPPED | OUTPATIENT
Start: 2022-03-21 | End: 2022-06-20

## 2022-03-21 NOTE — TELEPHONE ENCOUNTER
Rx Refill Note  Requested Prescriptions     Pending Prescriptions Disp Refills   • famotidine (PEPCID) 20 MG tablet [Pharmacy Med Name: Famotidine 20 MG Oral Tablet] 180 tablet 0     Sig: TAKE 2 TABLETS BY MOUTH AT BEDTIME      Last office visit with prescribing clinician: 1/24/2022      Next office visit with prescribing clinician: 5/16/2022            La Nena Ha MA  03/21/22, 07:53 CDT

## 2022-03-30 ENCOUNTER — OFFICE VISIT (OUTPATIENT)
Dept: FAMILY MEDICINE CLINIC | Facility: CLINIC | Age: 82
End: 2022-03-30

## 2022-03-30 ENCOUNTER — PRIOR AUTHORIZATION (OUTPATIENT)
Dept: FAMILY MEDICINE CLINIC | Facility: CLINIC | Age: 82
End: 2022-03-30

## 2022-03-30 ENCOUNTER — TELEPHONE (OUTPATIENT)
Dept: FAMILY MEDICINE CLINIC | Facility: CLINIC | Age: 82
End: 2022-03-30

## 2022-03-30 VITALS
HEART RATE: 91 BPM | HEIGHT: 63 IN | SYSTOLIC BLOOD PRESSURE: 122 MMHG | WEIGHT: 202.4 LBS | BODY MASS INDEX: 35.86 KG/M2 | TEMPERATURE: 97.8 F | OXYGEN SATURATION: 98 % | DIASTOLIC BLOOD PRESSURE: 82 MMHG

## 2022-03-30 DIAGNOSIS — R51.9 ACUTE NONINTRACTABLE HEADACHE, UNSPECIFIED HEADACHE TYPE: Primary | ICD-10-CM

## 2022-03-30 PROCEDURE — 99213 OFFICE O/P EST LOW 20 MIN: CPT | Performed by: NURSE PRACTITIONER

## 2022-03-30 PROCEDURE — 96372 THER/PROPH/DIAG INJ SC/IM: CPT | Performed by: NURSE PRACTITIONER

## 2022-03-30 RX ORDER — TRIAMCINOLONE ACETONIDE 40 MG/ML
40 INJECTION, SUSPENSION INTRA-ARTICULAR; INTRAMUSCULAR ONCE
Status: COMPLETED | OUTPATIENT
Start: 2022-03-30 | End: 2022-03-30

## 2022-03-30 RX ORDER — BUTALBITAL, ACETAMINOPHEN AND CAFFEINE 50; 325; 40 MG/1; MG/1; MG/1
1 TABLET ORAL EVERY 6 HOURS PRN
Qty: 12 TABLET | Refills: 0 | Status: SHIPPED | OUTPATIENT
Start: 2022-03-30 | End: 2022-11-21

## 2022-03-30 RX ADMIN — TRIAMCINOLONE ACETONIDE 40 MG: 40 INJECTION, SUSPENSION INTRA-ARTICULAR; INTRAMUSCULAR at 10:45

## 2022-03-30 NOTE — TELEPHONE ENCOUNTER
Patient advised medication required PA and medication has been approved.  She stated pharmacy keeps telling her ins will not cover.      Spoke with pharmacy.  She reprocessed with ins and medication covered.    Patient advised.

## 2022-03-30 NOTE — TELEPHONE ENCOUNTER
Approvedtoday   PA Case: [id number], Status: Approved, Coverage Starts on: 12/29/2021 12:00:00 AM, Coverage Ends on: 3/30/2023 12:00:00 AM.

## 2022-03-30 NOTE — PROGRESS NOTES
CC: headache    History:  Hailey Dickerson is a 81 y.o. female who presents today for evaluation of the above problems.      Headache started Sunday night while watching a movie.  Has taken Tylenol and it did help, but it hasn't gone away.   Notes that discomfort was going down into neck and shoulders, but this has gotten better.   States that she used to get headaches very frequently prior to care home, but has generally done well for several years.     HPI  ROS:  Review of Systems   Eyes: Negative for visual disturbance.   Neurological: Positive for headaches.       Allergies   Allergen Reactions   • Lortab [Hydrocodone-Acetaminophen] Nausea And Vomiting   • Ciprocinonide [Fluocinolone] Rash   • Sulfa Antibiotics Rash     Past Medical History:   Diagnosis Date   • Anxiety    • Arthritis    • GERD (gastroesophageal reflux disease)    • Hypertension      Past Surgical History:   Procedure Laterality Date   • ANKLE SURGERY     • CHOLECYSTECTOMY     • DILATATION AND CURETTAGE     • TOTAL KNEE ARTHROPLASTY Left 11/6/2019    Procedure: TOTAL KNEE REPLACEMENT;  Surgeon: Rashaad Capps MD;  Location: [facility];  Service: Orthopedics     Family History   Problem Relation Age of Onset   • Heart attack Mother    • Cancer Father         Lung   • No Known Problems Maternal Grandmother    • No Known Problems Maternal Grandfather    • No Known Problems Paternal Grandmother    • No Known Problems Paternal Grandfather       reports that she has never smoked. She has never used smokeless tobacco. She reports that she does not drink alcohol and does not use drugs.      Current Outpatient Medications:   •  alendronate (FOSAMAX) 70 MG tablet, Take 1 tablet by mouth Every 7 (Seven) Days., Disp: 12 tablet, Rfl: 3  •  aspirin 81 MG EC tablet, Take 1 tablet by mouth Daily., Disp: 30 tablet, Rfl: 0  •  cetirizine (zyrTEC) 10 MG tablet, Take 10 mg by mouth Daily., Disp: , Rfl:   •  citalopram (CeleXA) 20 MG tablet, Take 1 tablet by  "mouth once daily, Disp: 90 tablet, Rfl: 4  •  Cranberry 125 MG tablet, Take 1 tablet by mouth Daily., Disp: , Rfl:   •  famotidine (PEPCID) 20 MG tablet, TAKE 2 TABLETS BY MOUTH AT BEDTIME, Disp: 180 tablet, Rfl: 0  •  FIBER PO, Take  by mouth Daily., Disp: , Rfl:   •  indapamide (LOZOL) 2.5 MG tablet, TAKE 1 TABLET BY MOUTH ON MONDAY, WEDNESDAY AND FRIDAY, Disp: 36 tablet, Rfl: 3  •  lisinopril (PRINIVIL,ZESTRIL) 10 MG tablet, Take 1 tablet by mouth once daily, Disp: 90 tablet, Rfl: 1  •  Multiple Vitamins-Minerals (MULTIVITAMIN WITH MINERALS) tablet tablet, Take 1 tablet by mouth Daily., Disp: , Rfl:   •  simvastatin (ZOCOR) 20 MG tablet, TAKE 1 TABLET BY MOUTH ONCE DAILY IN THE EVENING, Disp: 90 tablet, Rfl: 3  •  butalbital-acetaminophen-caffeine (Esgic) -40 MG per tablet, Take 1 tablet by mouth Every 6 (Six) Hours As Needed for Headache., Disp: 12 tablet, Rfl: 0    Current Facility-Administered Medications:   •  triamcinolone acetonide (KENALOG-40) injection 40 mg, 40 mg, Intramuscular, Once, Chely Miranda, APRN    OBJECTIVE:  /82 (BP Location: Left arm, Patient Position: Sitting, Cuff Size: Large Adult)   Pulse 91   Temp 97.8 °F (36.6 °C) (Temporal)   Ht 160 cm (63\")   Wt 91.8 kg (202 lb 6.4 oz)   LMP  (LMP Unknown)   SpO2 98%   Breastfeeding No   BMI 35.85 kg/m²    Physical Exam  Vitals reviewed.   Constitutional:       Appearance: She is well-developed.   Eyes:      Extraocular Movements: Extraocular movements intact.      Right eye: Normal extraocular motion and no nystagmus.      Left eye: Normal extraocular motion and no nystagmus.      Conjunctiva/sclera: Conjunctivae normal.      Pupils: Pupils are equal, round, and reactive to light.   Cardiovascular:      Rate and Rhythm: Normal rate.   Pulmonary:      Effort: Pulmonary effort is normal.   Neurological:      Mental Status: She is alert and oriented to person, place, and time.   Psychiatric:         Behavior: Behavior normal. "         Assessment/Plan    Diagnoses and all orders for this visit:    1. Acute nonintractable headache, unspecified headache type (Primary)  -     triamcinolone acetonide (KENALOG-40) injection 40 mg  -     butalbital-acetaminophen-caffeine (Esgic) -40 MG per tablet; Take 1 tablet by mouth Every 6 (Six) Hours As Needed for Headache.  Dispense: 12 tablet; Refill: 0        An After Visit Summary was printed and given to the patient at discharge.  Return if symptoms worsen or fail to improve, for Next scheduled follow up.       EUGENE Pham 3/30/22    Electronically signed.

## 2022-03-30 NOTE — TELEPHONE ENCOUNTER
Caller: Hailey Dickerson    Relationship: Self    Best call back number: [phone]    What is the best time to reach you:   ANYTIME    Who are you requesting to speak with (clinical staff, provider,  specific staff member):   Clinical staff    Do you know the name of the person who called:   Hailey Dickerson    What was the call regarding:   Patient advised that pharmacy is requesting to have call back about Rx.     Do you require a callback:   Yes

## 2022-04-11 ENCOUNTER — OFFICE VISIT (OUTPATIENT)
Dept: FAMILY MEDICINE CLINIC | Facility: CLINIC | Age: 82
End: 2022-04-11

## 2022-04-11 VITALS
BODY MASS INDEX: 35.97 KG/M2 | OXYGEN SATURATION: 98 % | SYSTOLIC BLOOD PRESSURE: 140 MMHG | TEMPERATURE: 98.4 F | RESPIRATION RATE: 18 BRPM | WEIGHT: 203 LBS | HEIGHT: 63 IN | DIASTOLIC BLOOD PRESSURE: 72 MMHG | HEART RATE: 77 BPM

## 2022-04-11 DIAGNOSIS — M54.6 THORACIC BACK PAIN, UNSPECIFIED BACK PAIN LATERALITY, UNSPECIFIED CHRONICITY: Primary | ICD-10-CM

## 2022-04-11 DIAGNOSIS — R31.29 MICROSCOPIC HEMATURIA: ICD-10-CM

## 2022-04-11 DIAGNOSIS — R10.9 ABDOMINAL PAIN, UNSPECIFIED ABDOMINAL LOCATION: ICD-10-CM

## 2022-04-11 DIAGNOSIS — R11.0 NAUSEA: ICD-10-CM

## 2022-04-11 LAB
BILIRUB BLD-MCNC: NEGATIVE MG/DL
CLARITY, POC: CLEAR
COLOR UR: YELLOW
GLUCOSE UR STRIP-MCNC: NEGATIVE MG/DL
KETONES UR QL: NEGATIVE
LEUKOCYTE EST, POC: ABNORMAL
NITRITE UR-MCNC: NEGATIVE MG/ML
PH UR: 6 [PH] (ref 5–8)
PROT UR STRIP-MCNC: NEGATIVE MG/DL
RBC # UR STRIP: ABNORMAL /UL
SP GR UR: 1.02 (ref 1–1.03)
UROBILINOGEN UR QL: NORMAL

## 2022-04-11 PROCEDURE — 99213 OFFICE O/P EST LOW 20 MIN: CPT | Performed by: FAMILY MEDICINE

## 2022-04-11 PROCEDURE — 81003 URINALYSIS AUTO W/O SCOPE: CPT | Performed by: FAMILY MEDICINE

## 2022-04-11 NOTE — PROGRESS NOTES
"Hailey KUMAR Stone    1940    Chief Complaint   Patient presents with   • Back Pain     Thoracic    • Headache     Facial pain       Vitals:    04/11/22 1413   BP: 140/72   BP Location: Left arm   Patient Position: Sitting   Cuff Size: Large Adult   Pulse: 77   Resp: 18   Temp: 98.4 °F (36.9 °C)   TempSrc: Temporal   SpO2: 98%   Weight: 92.1 kg (203 lb)   Height: 160 cm (63\")   PainSc:   7       81-year-old female with intestinal flu last week now complaining of upper back pain and facial pain      Review of Systems   HENT: Positive for sinus pressure.    Respiratory: Negative for shortness of breath.    Cardiovascular: Negative for chest pain and leg swelling.   Gastrointestinal: Positive for diarrhea.   Genitourinary: Negative for difficulty urinating.       Past Medical History:   Diagnosis Date   • Anxiety    • Arthritis    • GERD (gastroesophageal reflux disease)    • Hypertension          Current Outpatient Medications:   •  alendronate (FOSAMAX) 70 MG tablet, Take 1 tablet by mouth Every 7 (Seven) Days., Disp: 12 tablet, Rfl: 3  •  aspirin 81 MG EC tablet, Take 1 tablet by mouth Daily., Disp: 30 tablet, Rfl: 0  •  butalbital-acetaminophen-caffeine (Esgic) -40 MG per tablet, Take 1 tablet by mouth Every 6 (Six) Hours As Needed for Headache., Disp: 12 tablet, Rfl: 0  •  cetirizine (zyrTEC) 10 MG tablet, Take 10 mg by mouth Daily., Disp: , Rfl:   •  citalopram (CeleXA) 20 MG tablet, Take 1 tablet by mouth once daily, Disp: 90 tablet, Rfl: 4  •  Cranberry 125 MG tablet, Take 1 tablet by mouth Daily., Disp: , Rfl:   •  famotidine (PEPCID) 20 MG tablet, TAKE 2 TABLETS BY MOUTH AT BEDTIME, Disp: 180 tablet, Rfl: 0  •  FIBER PO, Take  by mouth Daily., Disp: , Rfl:   •  indapamide (LOZOL) 2.5 MG tablet, TAKE 1 TABLET BY MOUTH ON MONDAY, WEDNESDAY AND FRIDAY, Disp: 36 tablet, Rfl: 3  •  lisinopril (PRINIVIL,ZESTRIL) 10 MG tablet, Take 1 tablet by mouth once daily, Disp: 90 tablet, Rfl: 1  •  Multiple " Vitamins-Minerals (MULTIVITAMIN WITH MINERALS) tablet tablet, Take 1 tablet by mouth Daily., Disp: , Rfl:   •  simvastatin (ZOCOR) 20 MG tablet, TAKE 1 TABLET BY MOUTH ONCE DAILY IN THE EVENING, Disp: 90 tablet, Rfl: 3    Allergies   Allergen Reactions   • Lortab [Hydrocodone-Acetaminophen] Nausea And Vomiting   • Ciprocinonide [Fluocinolone] Rash   • Sulfa Antibiotics Rash       Patient Active Problem List   Diagnosis   • Essential hypertension   • Anxiety   • GERD (gastroesophageal reflux disease)   • Primary osteoarthritis of left knee   • Osteoarthritis of left knee   • Mixed hyperlipidemia   • Morbidly obese (HCC)       Social History     Socioeconomic History   • Marital status:    Tobacco Use   • Smoking status: Never Smoker   • Smokeless tobacco: Never Used   Substance and Sexual Activity   • Alcohol use: No   • Drug use: No   • Sexual activity: Defer       Past Surgical History:   Procedure Laterality Date   • ANKLE SURGERY     • CHOLECYSTECTOMY     • DILATATION AND CURETTAGE     • TOTAL KNEE ARTHROPLASTY Left 11/6/2019    Procedure: TOTAL KNEE REPLACEMENT;  Surgeon: Rashaad Capps MD;  Location: [facility];  Service: Orthopedics       Physical Exam  Vitals and nursing note reviewed.   Constitutional:       Appearance: She is obese.   HENT:      Nose: Nose normal.      Mouth/Throat:      Mouth: Mucous membranes are moist.      Pharynx: Oropharynx is clear.   Cardiovascular:      Rate and Rhythm: Normal rate and regular rhythm.   Pulmonary:      Effort: Pulmonary effort is normal.      Breath sounds: Normal breath sounds.   Musculoskeletal:      Right lower leg: No edema.      Left lower leg: No edema.   Neurological:      General: No focal deficit present.      Mental Status: She is alert and oriented to person, place, and time.   Psychiatric:         Mood and Affect: Mood normal.         Behavior: Behavior normal.         Thought Content: Thought content normal.         Judgment: Judgment  "normal.         Vitals:    04/11/22 1413   BP: 140/72   BP Location: Left arm   Patient Position: Sitting   Cuff Size: Large Adult   Pulse: 77   Resp: 18   Temp: 98.4 °F (36.9 °C)   TempSrc: Temporal   SpO2: 98%   Weight: 92.1 kg (203 lb)   Height: 160 cm (63\")     Patient's Body mass index is 35.96 kg/m². indicating that she is obese (BMI >30). Obesity-related health conditions include the following: hypertension, dyslipidemias and osteoarthritis. Obesity is unchanged. BMI is is above average; BMI management plan is completed. We discussed portion control and increasing exercise..      Diagnoses and all orders for this visit:    1. Thoracic back pain, unspecified back pain laterality, unspecified chronicity (Primary)  -     POC Urinalysis Dipstick, Multipro    2. Nausea  -     POC Urinalysis Dipstick, Multipro    3. Microscopic hematuria  -     Urine Culture - Urine, Urine, Clean Catch    4. Abdominal pain, unspecified abdominal location  -     CBC & Differential  -     Comprehensive Metabolic Panel  -     Amylase  -     Lipase        Problems Addressed this Visit    None     Visit Diagnoses     Thoracic back pain, unspecified back pain laterality, unspecified chronicity    -  Primary    Relevant Orders    POC Urinalysis Dipstick, Multipro    Nausea        Relevant Orders    POC Urinalysis Dipstick, Multipro    Microscopic hematuria        Relevant Orders    Urine Culture - Urine, Urine, Clean Catch    Abdominal pain, unspecified abdominal location        Relevant Orders    CBC & Differential    Comprehensive Metabolic Panel    Amylase    Lipase      Diagnoses       Codes Comments    Thoracic back pain, unspecified back pain laterality, unspecified chronicity    -  Primary ICD-10-CM: M54.6  ICD-9-CM: 724.1     Nausea     ICD-10-CM: R11.0  ICD-9-CM: 787.02     Microscopic hematuria     ICD-10-CM: R31.29  ICD-9-CM: 599.72     Abdominal pain, unspecified abdominal location     ICD-10-CM: R10.9  ICD-9-CM: 789.00     "       Health Maintenance:  Immunization History   Administered Date(s) Administered   • COVID-19 (MODERNA) 1st, 2nd, 3rd Dose Only 01/06/2021, 02/02/2021, 10/25/2021   • FLUAD TRI 65YR+ 09/26/2019, 10/04/2021   • Fluad Quad 65+ 09/26/2019, 10/21/2020   • Fluzone High Dose =>65 Years ([employer] ONLY) 10/20/2016, 10/06/2017, 09/25/2018   • Fluzone High-Dose 65+yrs 10/04/2021   • Pneumococcal Conjugate 13-Valent (PCV13) 01/05/2016   • Pneumococcal Polysaccharide (PPSV23) 09/29/2009   • Shingrix 09/03/2020, 12/15/2020   • Tdap 12/18/2012   • Zostavax 01/05/2009   • influenza Split 11/01/2016            Plan above-Covid 3 months ago                      Electronically signed by Titi Cotton MD 04/11/2022

## 2022-04-13 ENCOUNTER — TELEPHONE (OUTPATIENT)
Dept: FAMILY MEDICINE CLINIC | Facility: CLINIC | Age: 82
End: 2022-04-13

## 2022-04-15 LAB
ALBUMIN SERPL-MCNC: 4.6 G/DL (ref 3.5–5.2)
ALBUMIN/GLOB SERPL: 1.6 G/DL
ALP SERPL-CCNC: 63 U/L (ref 39–117)
ALT SERPL-CCNC: 13 U/L (ref 1–33)
AMYLASE SERPL-CCNC: 91 U/L (ref 28–100)
AST SERPL-CCNC: 15 U/L (ref 1–32)
BACTERIA UR CULT: ABNORMAL
BACTERIA UR CULT: ABNORMAL
BASOPHILS # BLD AUTO: 0.09 10*3/MM3 (ref 0–0.2)
BASOPHILS NFR BLD AUTO: 1.2 % (ref 0–1.5)
BILIRUB SERPL-MCNC: 0.2 MG/DL (ref 0–1.2)
BUN SERPL-MCNC: 21 MG/DL (ref 8–23)
BUN/CREAT SERPL: 17.4 (ref 7–25)
CALCIUM SERPL-MCNC: 10 MG/DL (ref 8.6–10.5)
CHLORIDE SERPL-SCNC: 96 MMOL/L (ref 98–107)
CO2 SERPL-SCNC: 25.7 MMOL/L (ref 22–29)
CREAT SERPL-MCNC: 1.21 MG/DL (ref 0.57–1)
EGFRCR SERPLBLD CKD-EPI 2021: 44.8 ML/MIN/1.73
EOSINOPHIL # BLD AUTO: 0.04 10*3/MM3 (ref 0–0.4)
EOSINOPHIL NFR BLD AUTO: 0.5 % (ref 0.3–6.2)
ERYTHROCYTE [DISTWIDTH] IN BLOOD BY AUTOMATED COUNT: 12.6 % (ref 12.3–15.4)
GLOBULIN SER CALC-MCNC: 2.8 GM/DL
GLUCOSE SERPL-MCNC: 92 MG/DL (ref 65–99)
HCT VFR BLD AUTO: 43.2 % (ref 34–46.6)
HGB BLD-MCNC: 14.4 G/DL (ref 12–15.9)
IMM GRANULOCYTES # BLD AUTO: 0.03 10*3/MM3 (ref 0–0.05)
IMM GRANULOCYTES NFR BLD AUTO: 0.4 % (ref 0–0.5)
LIPASE SERPL-CCNC: 43 U/L (ref 13–60)
LYMPHOCYTES # BLD AUTO: 2.76 10*3/MM3 (ref 0.7–3.1)
LYMPHOCYTES NFR BLD AUTO: 37.9 % (ref 19.6–45.3)
MCH RBC QN AUTO: 33.2 PG (ref 26.6–33)
MCHC RBC AUTO-ENTMCNC: 33.3 G/DL (ref 31.5–35.7)
MCV RBC AUTO: 99.5 FL (ref 79–97)
MONOCYTES # BLD AUTO: 0.62 10*3/MM3 (ref 0.1–0.9)
MONOCYTES NFR BLD AUTO: 8.5 % (ref 5–12)
NEUTROPHILS # BLD AUTO: 3.74 10*3/MM3 (ref 1.7–7)
NEUTROPHILS NFR BLD AUTO: 51.5 % (ref 42.7–76)
NRBC BLD AUTO-RTO: 0 /100 WBC (ref 0–0.2)
OTHER ANTIBIOTIC SUSC ISLT: ABNORMAL
PLATELET # BLD AUTO: 326 10*3/MM3 (ref 140–450)
POTASSIUM SERPL-SCNC: 4.6 MMOL/L (ref 3.5–5.2)
PROT SERPL-MCNC: 7.4 G/DL (ref 6–8.5)
RBC # BLD AUTO: 4.34 10*6/MM3 (ref 3.77–5.28)
SODIUM SERPL-SCNC: 136 MMOL/L (ref 136–145)
WBC # BLD AUTO: 7.28 10*3/MM3 (ref 3.4–10.8)

## 2022-05-16 ENCOUNTER — OFFICE VISIT (OUTPATIENT)
Dept: FAMILY MEDICINE CLINIC | Facility: CLINIC | Age: 82
End: 2022-05-16

## 2022-05-16 VITALS
WEIGHT: 205 LBS | TEMPERATURE: 98.2 F | BODY MASS INDEX: 36.32 KG/M2 | RESPIRATION RATE: 18 BRPM | DIASTOLIC BLOOD PRESSURE: 78 MMHG | HEIGHT: 63 IN | OXYGEN SATURATION: 97 % | SYSTOLIC BLOOD PRESSURE: 132 MMHG | HEART RATE: 75 BPM

## 2022-05-16 DIAGNOSIS — Z12.31 SCREENING MAMMOGRAM FOR BREAST CANCER: ICD-10-CM

## 2022-05-16 DIAGNOSIS — I10 ESSENTIAL HYPERTENSION: ICD-10-CM

## 2022-05-16 DIAGNOSIS — R53.83 FATIGUE, UNSPECIFIED TYPE: Primary | ICD-10-CM

## 2022-05-16 DIAGNOSIS — E78.2 MIXED HYPERLIPIDEMIA: ICD-10-CM

## 2022-05-16 DIAGNOSIS — Z12.11 SCREENING FOR COLORECTAL CANCER: ICD-10-CM

## 2022-05-16 DIAGNOSIS — R26.89 BALANCE PROBLEMS: ICD-10-CM

## 2022-05-16 DIAGNOSIS — Z12.12 SCREENING FOR COLORECTAL CANCER: ICD-10-CM

## 2022-05-16 DIAGNOSIS — R41.3 MEMORY LOSS: ICD-10-CM

## 2022-05-16 DIAGNOSIS — E55.9 VITAMIN D DEFICIENCY: ICD-10-CM

## 2022-05-16 DIAGNOSIS — Z00.00 MEDICARE ANNUAL WELLNESS VISIT, SUBSEQUENT: ICD-10-CM

## 2022-05-16 LAB
BILIRUB BLD-MCNC: NEGATIVE MG/DL
CLARITY, POC: CLEAR
COLOR UR: YELLOW
GLUCOSE UR STRIP-MCNC: NEGATIVE MG/DL
KETONES UR QL: NEGATIVE
LEUKOCYTE EST, POC: ABNORMAL
NITRITE UR-MCNC: NEGATIVE MG/ML
PH UR: 7 [PH] (ref 5–8)
PROT UR STRIP-MCNC: NEGATIVE MG/DL
RBC # UR STRIP: NEGATIVE /UL
SP GR UR: 1.02 (ref 1–1.03)
UROBILINOGEN UR QL: NORMAL

## 2022-05-16 PROCEDURE — 1126F AMNT PAIN NOTED NONE PRSNT: CPT | Performed by: FAMILY MEDICINE

## 2022-05-16 PROCEDURE — 81003 URINALYSIS AUTO W/O SCOPE: CPT | Performed by: FAMILY MEDICINE

## 2022-05-16 PROCEDURE — 1159F MED LIST DOCD IN RCRD: CPT | Performed by: FAMILY MEDICINE

## 2022-05-16 PROCEDURE — 1170F FXNL STATUS ASSESSED: CPT | Performed by: FAMILY MEDICINE

## 2022-05-16 PROCEDURE — G0439 PPPS, SUBSEQ VISIT: HCPCS | Performed by: FAMILY MEDICINE

## 2022-05-16 NOTE — PROGRESS NOTES
The ABCs of the Annual Wellness Visit  Subsequent Medicare Wellness Visit    Chief Complaint   Patient presents with   • Medicare Wellness-subsequent     Fasting with pelvic      Subjective    History of Present Illness:  Hailey Dickerson is a 81 y.o. female who presents for a Subsequent Medicare Wellness Visit.    The following portions of the patient's history were reviewed and   updated as appropriate: allergies, current medications, past family history, past medical history, past social history, past surgical history and problem list.    Compared to one year ago, the patient feels her physical   health is the same.    Compared to one year ago, the patient feels her mental   health is the same.    Recent Hospitalizations:  She was not admitted to the hospital during the last year.       Current Medical Providers:  Patient Care Team:  Titi Cotton MD as PCP - General (Family Medicine)    Outpatient Medications Prior to Visit   Medication Sig Dispense Refill   • alendronate (FOSAMAX) 70 MG tablet Take 1 tablet by mouth Every 7 (Seven) Days. 12 tablet 3   • aspirin 81 MG EC tablet Take 1 tablet by mouth Daily. 30 tablet 0   • butalbital-acetaminophen-caffeine (Esgic) -40 MG per tablet Take 1 tablet by mouth Every 6 (Six) Hours As Needed for Headache. 12 tablet 0   • cetirizine (zyrTEC) 10 MG tablet Take 10 mg by mouth Daily.     • citalopram (CeleXA) 20 MG tablet Take 1 tablet by mouth once daily 90 tablet 4   • Cranberry 125 MG tablet Take 1 tablet by mouth Daily.     • famotidine (PEPCID) 20 MG tablet TAKE 2 TABLETS BY MOUTH AT BEDTIME 180 tablet 0   • FIBER PO Take  by mouth Daily.     • indapamide (LOZOL) 2.5 MG tablet TAKE 1 TABLET BY MOUTH ON MONDAY, WEDNESDAY AND FRIDAY 36 tablet 3   • lisinopril (PRINIVIL,ZESTRIL) 10 MG tablet Take 1 tablet by mouth once daily 90 tablet 1   • Multiple Vitamins-Minerals (MULTIVITAMIN WITH MINERALS) tablet tablet Take 1 tablet by mouth Daily.     • simvastatin  "(ZOCOR) 20 MG tablet TAKE 1 TABLET BY MOUTH ONCE DAILY IN THE EVENING 90 tablet 3     No facility-administered medications prior to visit.       No opioid medication identified on active medication list. I have reviewed chart for other potential  high risk medication/s and harmful drug interactions in the elderly.          Aspirin is on active medication list. Aspirin use is indicated based on review of current medical condition/s. Pros and cons of this therapy have been discussed today. Benefits of this medication outweigh potential harm.  Patient has been encouraged to continue taking this medication.  .      Patient Active Problem List   Diagnosis   • Essential hypertension   • Anxiety   • GERD (gastroesophageal reflux disease)   • Primary osteoarthritis of left knee   • Osteoarthritis of left knee   • Mixed hyperlipidemia   • Morbidly obese (HCC)     Advance Care Planning  Advance Directive is not on file.  ACP discussion was declined by the patient. Patient does not have an advance directive, declines further assistance.    Review of Systems   Respiratory: Positive for shortness of breath.         Cardiologist cleared shortness of breath I believe is deconditioning   Cardiovascular: Negative for chest pain and leg swelling.   Gastrointestinal: Negative for abdominal distention and abdominal pain.   Genitourinary: Negative for difficulty urinating.   Musculoskeletal: Positive for arthralgias.   Neurological:        Balance an issue-patient is having falls   All other systems reviewed and are negative.       Objective    Vitals:    05/16/22 0859   BP: 132/78   BP Location: Left arm   Patient Position: Sitting   Cuff Size: Large Adult   Pulse: 75   Resp: 18   Temp: 98.2 °F (36.8 °C)   TempSrc: Temporal   SpO2: 97%   Weight: 93 kg (205 lb)   Height: 160 cm (63\")   PainSc: 0-No pain     BMI Readings from Last 1 Encounters:   05/16/22 36.31 kg/m²   BMI is above normal parameters. Recommendations include: exercise " counseling    Does the patient have evidence of cognitive impairment? No    Physical Exam  Vitals and nursing note reviewed.   Constitutional:       Appearance: She is obese.   HENT:      Right Ear: Tympanic membrane and ear canal normal.      Left Ear: Tympanic membrane and ear canal normal.   Eyes:      Extraocular Movements: Extraocular movements intact.      Pupils: Pupils are equal, round, and reactive to light.   Neck:      Vascular: No carotid bruit.   Cardiovascular:      Rate and Rhythm: Normal rate and regular rhythm.      Pulses: Normal pulses.      Heart sounds: Normal heart sounds.   Pulmonary:      Effort: Pulmonary effort is normal.      Breath sounds: Normal breath sounds.      Comments: Breast no masses noted  Abdominal:      General: Abdomen is flat.      Palpations: Abdomen is soft. There is no mass.      Tenderness: There is no abdominal tenderness.   Genitourinary:     General: Normal vulva.      Comments: Difficult vaginal exam because of inability to abduct hips-atrophic vaginitis cervix not visualized uterus felt to be normal size no adnexal masses but difficult exam  Musculoskeletal:      Right lower leg: No edema.      Left lower leg: No edema.   Lymphadenopathy:      Cervical: No cervical adenopathy.   Skin:     General: Skin is warm and dry.   Neurological:      General: No focal deficit present.      Mental Status: She is alert and oriented to person, place, and time.   Psychiatric:         Mood and Affect: Mood normal.         Behavior: Behavior normal.         Thought Content: Thought content normal.         Judgment: Judgment normal.           Plan above-physical therapy for balance and gait training      HEALTH RISK ASSESSMENT    Smoking Status:  Social History     Tobacco Use   Smoking Status Never Smoker   Smokeless Tobacco Never Used     Alcohol Consumption:  Social History     Substance and Sexual Activity   Alcohol Use No     Fall Risk Screen:    STEADI Fall Risk Assessment was  completed, and patient is at LOW risk for falls.Assessment completed on:4/11/2022    Depression Screening:  PHQ-2/PHQ-9 Depression Screening 5/16/2022   Retired PHQ-9 Total Score -   Retired Total Score -   Little Interest or Pleasure in Doing Things 0-->not at all   Feeling Down, Depressed or Hopeless 0-->not at all   PHQ-9: Brief Depression Severity Measure Score 0       Health Habits and Functional and Cognitive Screening:  Functional & Cognitive Status 5/16/2022   Do you have difficulty preparing food and eating? No   Do you have difficulty bathing yourself, getting dressed or grooming yourself? No   Do you have difficulty using the toilet? No   Do you have difficulty moving around from place to place? No   Do you have trouble with steps or getting out of a bed or a chair? Yes   Current Diet Well Balanced Diet   Dental Exam Up to date   Eye Exam Up to date   Exercise (times per week) 2 times per week   Current Exercises Include Walking;House Cleaning   Current Exercise Activities Include -   Do you need help using the phone?  No   Are you deaf or do you have serious difficulty hearing?  Yes   Do you need help with transportation? Yes   Do you need help shopping? No   Do you need help preparing meals?  No   Do you need help with housework?  No   Do you need help with laundry? No   Do you need help taking your medications? No   Do you need help managing money? No   Do you ever drive or ride in a car without wearing a seat belt? No   Have you felt unusual stress, anger or loneliness in the last month? No   Who do you live with? Spouse   If you need help, do you have trouble finding someone available to you? No   Have you been bothered in the last four weeks by sexual problems? No   Do you have difficulty concentrating, remembering or making decisions? No       Age-appropriate Screening Schedule:  Refer to the list below for future screening recommendations based on patient's age, sex and/or medical conditions.  Orders for these recommended tests are listed in the plan section. The patient has been provided with a written plan.    Health Maintenance   Topic Date Due   • INFLUENZA VACCINE  08/01/2022   • DXA SCAN  10/27/2022   • LIPID PANEL  11/11/2022   • TDAP/TD VACCINES (2 - Td or Tdap) 12/18/2022   • ZOSTER VACCINE  Discontinued              Assessment & Plan   CMS Preventative Services Quick Reference  Risk Factors Identified During Encounter  Fall Risk-High or Moderate  The above risks/problems have been discussed with the patient.  Follow up actions/plans if indicated are seen below in the Assessment/Plan Section.  Pertinent information has been shared with the patient in the After Visit Summary.    Diagnoses and all orders for this visit:    1. Fatigue, unspecified type (Primary)  -     TSH  -     T4, free  -     CBC & Differential    2. Essential hypertension  -     POC Urinalysis Dipstick, Multipro    3. Mixed hyperlipidemia  -     Comprehensive Metabolic Panel  -     Lipid Panel    4. Memory loss  -     Vitamin B12  -     Folate    5. Vitamin D deficiency  -     Vitamin D 25 Hydroxy    6. Screening for colorectal cancer  -     Cologuard - Stool, Per Rectum; Future    7. Screening mammogram for breast cancer  -     Mammo Screening Digital Tomosynthesis Bilateral With CAD; Future    8. Medicare annual wellness visit, subsequent    9. Balance problems  -     Ambulatory Referral to Physical Therapy Evaluate and treat; Strengthening; Right, Left        Follow Up:   Return in about 6 months (around 11/16/2022).     An After Visit Summary and PPPS were made available to the patient.        I spent 30 minutes caring for Hailey on this date of service. This time includes time spent by me in the following activities:preparing for the visit, reviewing tests, obtaining and/or reviewing a separately obtained history, performing a medically appropriate examination and/or evaluation , counseling and educating the  patient/family/caregiver, ordering medications, tests, or procedures and documenting information in the medical record

## 2022-05-16 NOTE — PATIENT INSTRUCTIONS
Advance Care Planning and Advance Directives     You make decisions on a daily basis - decisions about where you want to live, your career, your home, your life. Perhaps one of the most important decisions you face is your choice for future medical care. Take time to talk with your family and your healthcare team and start planning today.  Advance Care Planning is a process that can help you:  · Understand possible future healthcare decisions in light of your own experiences  · Reflect on those decision in light of your goals and values  · Discuss your decisions with those closest to you and the healthcare professionals that care for you  · Make a plan by creating a document that reflects your wishes    Surrogate Decision Maker  In the event of a medical emergency, which has left you unable to communicate or to make your own decisions, you would need someone to make decisions for you.  It is important to discuss your preferences for medical treatment with this person while you are in good health.     Qualities of a surrogate decision maker:  • Willing to take on this role and responsibility  • Knows what you want for future medical care  • Willing to follow your wishes even if they don't agree with them  • Able to make difficult medical decisions under stressful circumstances    Advance Directives  These are legal documents you can create that will guide your healthcare team and decision maker(s) when needed. These documents can be stored in the electronic medical record.    · Living Will - a legal document to guide your care if you have a terminal condition or a serious illness and are unable to communicate. States vary by statute in document names/types, but most forms may include one or more of the following:        -  Directions regarding life-prolonging treatments        -  Directions regarding artificially provided nutrition/hydration        -  Choosing a healthcare decision maker        -  Direction  regarding organ/tissue donation    · Durable Power of  for Healthcare - this document names an -in-fact to make medical decisions for you, but it may also allow this person to make personal and financial decisions for you. Please seek the advice of an  if you need this type of document.    **Advance Directives are not required and no one may discriminate against you if you do not sign one.    Medical Orders  Many states allow specific forms/orders signed by your physician to record your wishes for medical treatment in your current state of health. This form, signed in personal communication with your physician, addresses resuscitation and other medical interventions that you may or may not want.      For more information or to schedule a time with a [facility] Advance Care Planning Facilitator contact: [facility] or call [phone] and someone will contact you directly.    Medicare Wellness  Personal Prevention Plan of Service     Date of Office Visit:    Encounter Provider:  Titi Cotton MD  Place of Service:  [facility]  Patient Name: Hailey Dickerson  :  1940    As part of the Medicare Wellness portion of your visit today, we are providing you with this personalized preventive plan of services (PPPS). This plan is based upon recommendations of the [place] Preventive Services Task Force (USPSTF) and the Advisory Committee on Immunization Practices (ACIP).    This lists the preventive care services that should be considered, and provides dates of when you are due. Items listed as completed are up-to-date and do not require any further intervention.    Health Maintenance   Topic Date Due   • INFLUENZA VACCINE  2022   • DXA SCAN  10/27/2022   • LIPID PANEL  2022   • TDAP/TD VACCINES (2 - Td or Tdap) 2022   • ANNUAL WELLNESS VISIT  2023   • COLORECTAL CANCER SCREENING  2023   • COVID-19  Vaccine  Completed   • Pneumococcal Vaccine 65+  Completed   • ZOSTER VACCINE  Discontinued       No orders of the defined types were placed in this encounter.      No follow-ups on file.

## 2022-05-17 LAB
25(OH)D3+25(OH)D2 SERPL-MCNC: 51.3 NG/ML (ref 30–100)
ALBUMIN SERPL-MCNC: 4.5 G/DL (ref 3.5–5.2)
ALBUMIN/GLOB SERPL: 1.6 G/DL
ALP SERPL-CCNC: 52 U/L (ref 39–117)
ALT SERPL-CCNC: 13 U/L (ref 1–33)
AST SERPL-CCNC: 16 U/L (ref 1–32)
BASOPHILS # BLD AUTO: 0.07 10*3/MM3 (ref 0–0.2)
BASOPHILS NFR BLD AUTO: 1 % (ref 0–1.5)
BILIRUB SERPL-MCNC: 0.4 MG/DL (ref 0–1.2)
BUN SERPL-MCNC: 23 MG/DL (ref 8–23)
BUN/CREAT SERPL: 22.5 (ref 7–25)
CALCIUM SERPL-MCNC: 10 MG/DL (ref 8.6–10.5)
CHLORIDE SERPL-SCNC: 99 MMOL/L (ref 98–107)
CHOLEST SERPL-MCNC: 196 MG/DL (ref 0–200)
CO2 SERPL-SCNC: 24.6 MMOL/L (ref 22–29)
CREAT SERPL-MCNC: 1.02 MG/DL (ref 0.57–1)
EGFRCR SERPLBLD CKD-EPI 2021: 55 ML/MIN/1.73
EOSINOPHIL # BLD AUTO: 0.08 10*3/MM3 (ref 0–0.4)
EOSINOPHIL NFR BLD AUTO: 1.2 % (ref 0.3–6.2)
ERYTHROCYTE [DISTWIDTH] IN BLOOD BY AUTOMATED COUNT: 12.2 % (ref 12.3–15.4)
FOLATE SERPL-MCNC: >20 NG/ML (ref 4.78–24.2)
GLOBULIN SER CALC-MCNC: 2.8 GM/DL
GLUCOSE SERPL-MCNC: 95 MG/DL (ref 65–99)
HCT VFR BLD AUTO: 41.1 % (ref 34–46.6)
HDLC SERPL-MCNC: 60 MG/DL (ref 40–60)
HGB BLD-MCNC: 13.4 G/DL (ref 12–15.9)
IMM GRANULOCYTES # BLD AUTO: 0.02 10*3/MM3 (ref 0–0.05)
IMM GRANULOCYTES NFR BLD AUTO: 0.3 % (ref 0–0.5)
LDLC SERPL CALC-MCNC: 112 MG/DL (ref 0–100)
LYMPHOCYTES # BLD AUTO: 2.19 10*3/MM3 (ref 0.7–3.1)
LYMPHOCYTES NFR BLD AUTO: 31.6 % (ref 19.6–45.3)
MCH RBC QN AUTO: 32.5 PG (ref 26.6–33)
MCHC RBC AUTO-ENTMCNC: 32.6 G/DL (ref 31.5–35.7)
MCV RBC AUTO: 99.8 FL (ref 79–97)
MONOCYTES # BLD AUTO: 0.67 10*3/MM3 (ref 0.1–0.9)
MONOCYTES NFR BLD AUTO: 9.7 % (ref 5–12)
NEUTROPHILS # BLD AUTO: 3.89 10*3/MM3 (ref 1.7–7)
NEUTROPHILS NFR BLD AUTO: 56.2 % (ref 42.7–76)
NRBC BLD AUTO-RTO: 0 /100 WBC (ref 0–0.2)
PLATELET # BLD AUTO: 339 10*3/MM3 (ref 140–450)
POTASSIUM SERPL-SCNC: 4.4 MMOL/L (ref 3.5–5.2)
PROT SERPL-MCNC: 7.3 G/DL (ref 6–8.5)
RBC # BLD AUTO: 4.12 10*6/MM3 (ref 3.77–5.28)
SODIUM SERPL-SCNC: 138 MMOL/L (ref 136–145)
T4 FREE SERPL-MCNC: 1.15 NG/DL (ref 0.93–1.7)
TRIGL SERPL-MCNC: 137 MG/DL (ref 0–150)
TSH SERPL DL<=0.005 MIU/L-ACNC: 2.49 UIU/ML (ref 0.27–4.2)
VIT B12 SERPL-MCNC: 771 PG/ML (ref 211–946)
VLDLC SERPL CALC-MCNC: 24 MG/DL (ref 5–40)
WBC # BLD AUTO: 6.92 10*3/MM3 (ref 3.4–10.8)

## 2022-05-19 RX ORDER — SIMVASTATIN 40 MG
20 TABLET ORAL EVERY EVENING
Qty: 90 TABLET | Refills: 3 | Status: SHIPPED | OUTPATIENT
Start: 2022-05-19

## 2022-05-23 RX ORDER — LISINOPRIL 10 MG/1
TABLET ORAL
Qty: 90 TABLET | Refills: 3 | Status: SHIPPED | OUTPATIENT
Start: 2022-05-23

## 2022-05-23 NOTE — TELEPHONE ENCOUNTER
Rx Refill Note  Requested Prescriptions     Pending Prescriptions Disp Refills   • lisinopril (PRINIVIL,ZESTRIL) 10 MG tablet [Pharmacy Med Name: Lisinopril 10 MG Oral Tablet] 90 tablet 0     Sig: Take 1 tablet by mouth once daily      Last office visit with prescribing clinician: 5/16/2022      Next office visit with prescribing clinician: 11/21/2022       {TIP  Please add Last Relevant Lab 5//16/22    La Nena Ha MA  05/23/22, 07:39 CDT

## 2022-05-24 ENCOUNTER — HOSPITAL ENCOUNTER (OUTPATIENT)
Dept: PHYSICAL THERAPY | Facility: HOSPITAL | Age: 82
Setting detail: THERAPIES SERIES
Discharge: HOME | End: 2022-05-24

## 2022-05-24 DIAGNOSIS — R26.89 BALANCE PROBLEMS: Primary | ICD-10-CM

## 2022-05-25 NOTE — THERAPY EVALUATION
Outpatient Physical Therapy Ortho Initial Evaluation  [facility]/[place]     Patient Name: Hailey Dickerson  : 1940  MRN: [id number]  Today's Date: 2022      Visit Date: 2022     Attendance: 1 visits  Subjective improvement: N/A  MD appt: PRN  Recheck due: 2022      Patient Active Problem List   Diagnosis   • Essential hypertension   • Anxiety   • GERD (gastroesophageal reflux disease)   • Primary osteoarthritis of left knee   • Osteoarthritis of left knee   • Mixed hyperlipidemia   • Morbidly obese (HCC)        Past Medical History:   Diagnosis Date   • Anxiety    • Arthritis    • GERD (gastroesophageal reflux disease)    • Hypertension         Past Surgical History:   Procedure Laterality Date   • ANKLE SURGERY     • CHOLECYSTECTOMY     • DILATATION AND CURETTAGE     • TOTAL KNEE ARTHROPLASTY Left 2019    Procedure: TOTAL KNEE REPLACEMENT;  Surgeon: Rashaad Capps MD;  Location: [facility] OR;  Service: Orthopedics       Visit Dx:     ICD-10-CM ICD-9-CM   1. Balance problems  R26.89 781.99          Patient History     Row Name 22 0900             History    Chief Complaint Balance Problems;Difficulty Walking;Fatigue/poor endurance  -KG      Date Current Problem(s) Began --  Chronic; had covid in 2022  -KG      Brief Description of Current Complaint Presents with gait and balance issues. Been going on for a while per her report. States she had Covid in 2022 and still gets SOA and tired quickly. Had a fall ~3 weeks ago, thinks she just tripped. That's the only fall she has had this past year. Used to exercise at [employer] all the time but hasn't since she got Covid. Stairs/steps are really hard for her, has to pull herself up with the rail. LIves in a sinlge level home with  but has steps at her [employer]. Has cane & RW but doesn't use them much. Gets unbalanced on unlevel surfaces.  -KG      Patient/Caregiver Goals Improve mobility;Improve strength  -KG       Hand Dominance right-handed  -KG      Occupation/sports/leisure activities Enjoys exercising, stays active. [employer]  -KG              Pain     Pain Comments Gets HA's sometimes but doesn't report any other chronic pains  -KG      Is your sleep disturbed? No  -KG              Fall Risk Assessment    Any falls in the past year: Yes  -KG      Number of falls reported in the last 12 months 1  -KG      Factors that contributed to the fall: Lost balance;Tripped  -KG              Services    Are you currently receiving Home Health services No  -KG      Do you plan to receive Home Health services in the near future No  -KG            User Key  (r) = Recorded By, (t) = Taken By, (c) = Cosigned By    Initials Name Provider Type    KG Cary Ramon, PT Physical Therapist                        PT Ortho     Row Name 05/24/22 0900       Precautions and Contraindications    Precautions/Limitations no known precautions/limitations  -KG       Posture/Observations    Posture/Observations Comments Decreased postural awareness, more so when seated. Rounded shoulders posture  -KG       General ROM    GENERAL ROM COMMENTS BUE AROM WFL at shoulder, elbow. Bilateral hip, knee, ankle AROM WFL  -KG       MMT (Manual Muscle Testing)    Rt Upper Ext Rt Shoulder Flexion;Rt Shoulder ABduction;Rt Elbow Flexion;Rt Elbow Extension  -KG    Lt Upper Ext Lt Shoulder Flexion;Lt Shoulder ABduction;Lt Elbow Extension;Lt Elbow Flexion  -KG    Rt Lower Ext Rt Hip Flexion;Rt Hip Extension;Rt Hip ABduction;Rt Hip ADduction;Rt Knee Extension;Rt Knee Flexion;Rt Ankle Plantarflexion;Rt Ankle Dorsiflexion  -KG    Lt Lower Ext Lt Hip Flexion;Lt Hip Extension;Lt Hip ADduction;Lt Hip ABduction;Lt Knee Extension;Lt Knee Flexion;Lt Ankle Plantarflexion;Lt Ankle Dorsiflexion  -KG       MMT Right Upper Ext    Rt Shoulder Flexion MMT, Gross Movement (5/5) normal  -KG    Rt Shoulder ABduction MMT, Gross Movement (5/5) normal  -KG    Rt Elbow Flexion MMT, Gross  "Movement: (5/5) normal  -KG    Rt Elbow Extension MMT, Gross Movement: (5/5) normal  -KG       MMT Left Upper Ext    Lt Shoulder Flexion MMT, Gross Movement (5/5) normal  -KG    Lt Shoulder ABduction MMT, Gross Movement (5/5) normal  -KG    Lt Elbow Flexion MMT, Gross Movement (5/5) normal  -KG    Lt Elbow Extension MMT, Gross Movement (5/5) normal  -KG       MMT Right Lower Ext    Rt Hip Flexion MMT, Gross Movement (4+/5) good plus  -KG    Rt Hip Extension MMT, Gross Movement (4/5) good  -KG    Rt Hip ABduction MMT, Gross Movement (4/5) good  -KG    Rt Hip ADduction MMT, Gross Movement (4/5) good  -KG    Rt Knee Extension MMT, Gross Movement (5/5) normal  -KG    Rt Knee Flexion MMT, Gross Movement (5/5) normal  -KG    Rt Ankle Plantarflexion MMT, Gross Movement (5/5) normal  -KG    Rt Ankle Dorsiflexion MMT, Gross Movement (5/5) normal  -KG       MMT Left Lower Ext    Lt Hip Flexion MMT, Gross Movement (4+/5) good plus  -KG    Lt Hip Extension MMT, Gross Movement (4/5) good  -KG    Lt Hip ABduction MMT, Gross Movement (4/5) good  -KG    Lt Hip ADduction MMT, Gross Movement (4/5) good  -KG    Lt Knee Extension MMT, Gross Movement (5/5) normal  -KG    Lt Knee Flexion MMT, Gross Movement (5/5) normal  -KG    Lt Ankle Plantarflexion MMT, Gross Movement (5/5) normal  -KG    Lt Ankle Dorsiflexion MMT, Gross Movement (5/5) normal  -KG       Sensation    Sensation WNL? WFL  -KG    [employer] No apparent deficits  -KG       Flexibility    Flexibility Tested? Lower Extremity  -KG       Lower Extremity Flexibility    Hamstrings Bilateral:;Mildly limited  -KG    Hip Flexors Bilateral:;Mildly limited  -KG    Gastrocnemius Bilateral:;Mildly limited  -KG       Balance Skills Training    Balance Comments Level ground: FA/EO 30\", FT/EC: 30\" with increased sway. Airex: FA/EO 30\", FT/EC 19\". Airex FT with dynamic reaching outside of JERRY challenging but had no LOB. LE shaking/decreased control noted. See functional outcome measures " "section for DGI and TUG test results.  -KG       Gait/Stairs (Locomotion)    Comment, (Gait/Stairs) Ambulates with no AD. Decreased nirmala. Decreased arms swing and holds arms/hands out intermittently. Shuffles feet. Cues for heel strike and to increase step length. Manages training steps in reciprocal pattern with ascent & step to descent. Uses rails to pull self up.  -KG          User Key  (r) = Recorded By, (t) = Taken By, (c) = Cosigned By    Initials Name Provider Type    KG Cary Ramon, PT Physical Therapist                          Therapy Education  Education Details: POC education. HEP: see exercise flowhseet for details  Given: HEP, Symptoms/condition management, Posture/body mechanics, Mobility training  Program: New  How Provided: Verbal, Demonstration, Written  Provided to: Patient  Level of Understanding: Teach back education performed, Verbalized, Demonstrated      PT OP Goals     Row Name 05/24/22 0900          PT Short Term Goals    STG Date to Achieve 06/14/22  -KG     STG 1 Demonstrate independence/compliance in performance of initial HEP  -KG     STG 2 Demonstrate improved seated PN/postural positioning with stabilization/strengthening activities with minimal cues required to correct  -KG     STG 3 Improve TUG score to 16\" or less demonstrating improved functional mobility/dynamic balance  -KG     STG 4 Perform airex FT/EC static balance for 30\" with no LOB  -KG            Long Term Goals    LTG Date to Achieve 07/04/22  -KG     LTG 1 Subjectively report 50% overall improvement or greater  -KG     LTG 2 Improve dynamic gait index score to 18/24 or greater demonstrating improved safety/performance with functional mobility  -KG     LTG 3 Demonstrate improved bilateral hip MMT to 4+/5 or greater in all planes  -KG     LTG 4 Ascend/descend trainng steps (4\"/6\") 1x5 with reciprocal pattern, good eccentric control, handrails prn  -KG     LTG 5 Perform balance course consisting of bench step, " hurdles, unlevel/compliant surface, cones with no LOB & good overall LE/core control  -KG     LTG 6 Tolerate 20 minutes of standing stabilization/strengthening activities with </= 2 seated reast breaks to demo improved endurance/activity tolerance  -KG            Time Calculation    PT Goal Re-Cert Due Date 06/14/22  -KG           User Key  (r) = Recorded By, (t) = Taken By, (c) = Cosigned By    Initials Name Provider Type    KG Cary Ramon, PT Physical Therapist                 PT Assessment/Plan     Row Name 05/24/22 0900          PT Assessment    Functional Limitations Impaired gait;Limitation in home management;Limitations in community activities;Performance in leisure activities  -KG     Impairments Balance;Gait;Impaired flexibility;Impaired muscle endurance;Muscle strength;Poor body mechanics;Posture  -KG     Assessment Comments Pt is a an 81 y.o. female presenting with BLE strength, balance, & gait deficits. Pt was diagnosed with Covid in Feb 2022. Has some residual weakness and SOA due to this. Pt ambulates with no AD and tends to shuffle feet with slow nirmala. Intermittent unsteadiness noted. Does demonstrates some LE fatigue and decreased endurance with activities performed during eval this date. Pt will benefit from skilled PT services to address these deficits for improvements in dynamic balance, functional strength/stability, activity tolerance, & performance with functional mobility.  -KG     Rehab Potential Good  -KG     Patient/caregiver participated in establishment of treatment plan and goals Yes  -KG     Patient would benefit from skilled therapy intervention Yes  -KG            PT Plan    PT Frequency 2x/week  -KG     Predicted Duration of Therapy Intervention (PT) 12 visits  -KG     Planned CPT's? PT EVAL LOW COMPLEXITY: 04142;PT THER PROC EA 15 MIN: 86037;PT THER ACT EA 15 MIN: 43529;PT MANUAL THERAPY EA 15 MIN: 10964;PT NEUROMUSC RE-EDUCATION EA 15 MIN: 48605;PT GAIT TRAINING EA 15 MIN:  79201  -KG     Physical Therapy Interventions (Optional Details) balance training;gait training;lumbar stabilization;neuromuscular re-education;patient/family education;postural re-education;strengthening;stretching  -KG     PT Plan Comments Work on overall BLE strengthening, core stability, dynamic/static balance, gait performance. Work on muscle endurance & activity tolerance. Progress as toelrated.  -KG           User Key  (r) = Recorded By, (t) = Taken By, (c) = Cosigned By    Initials Name Provider Type    KG Cary Ramon, PT Physical Therapist                   OP Exercises     Row Name 05/24/22 0900             Subjective Comments    Subjective Comments Refer to therapy history for details  -KG              Subjective Pain    Able to rate subjective pain? yes  -KG      Pre-Treatment Pain Level 0  -KG      Post-Treatment Pain Level 0  -KG              Exercise 1    Exercise Name 1 Seated alt LAQ  -KG      Cueing 1 Verbal  -KG      Sets 1 1  -KG      Reps 1 15  -KG              Exercise 2    Exercise Name 2 Seated alt marching  -KG      Cueing 2 Verbal  -KG      Sets 2 1  -KG      Reps 2 15  -KG              Exercise 3    Exercise Name 3 Seated hip abd  -KG      Cueing 3 Verbal  -KG      Sets 3 1  -KG      Reps 3 15  -KG      Additional Comments yellow tband loop  -KG              Exercise 4    Exercise Name 4 Sit to stands from chair  -KG      Cueing 4 Verbal  -KG      Sets 4 1  -KG      Reps 4 10  -KG      Additional Comments no UE assist  -KG              Exercise 5    Exercise Name 5 Gait at railing and waiting area working on kinematics; heel strike & toe off, step height/length  -KG      Time 5 5 min  -KG            User Key  (r) = Recorded By, (t) = Taken By, (c) = Cosigned By    Initials Name Provider Type    Cary Buenrostro, PT Physical Therapist                              Outcome Measure Options: Lower Extremity Functional Scale (LEFS), Dynamic Gait Index, Timed Up and Go (TUG)  Dynamic  Gait Index (DGI)  Gait Level Surface: Mild impairment: walks 20’, uses assistive devices, slower speed, mild gait deviations  Change in Gait Speed: Mild impairment: Is able to change speed but demonstrates mild gait deviations, or no gait deviations but unable to achieve a significant change in velocity, or uses as assistive device  Gait with Horizontal Head Turns: Mild impairment: Performs head turns smoothly with slight change in gait velocity, i.e. minor disruption to smooth gait path or uses walking aid.  Gait with Vertical Head Turns: Mild impairment: Performs head turns smoothly with slight change in gait velocity, i.e. minor disruption to smooth gait path or uses walking aid.  Gait and Pivot Turn: Mild impairment: pivot turns safely in >3 seconds and stops with no loss of balance.  Step Over Obstacle: Mild impairment: Is able to step over shoe box, but must slow down and adjust steps to clear box safely.  Step Around Obstacles: Mild impairment: Is able to step around both cones, but must slow down and adjust steps to clear cones.  Steps: Mild impairment: Alternating feet, must use rail.  Dynamic Gait Index Score: 16  Dynamic Gait Index Comments: </=19/24 increased fall risk in elderly  Lower Extremity Functional Index  Any of your usual work, housework or school activities: No difficulty  Your usual hobbies, recreational or sporting activities: No difficulty  Getting into or out of the bath: A little bit of difficulty  Walking between rooms: No difficulty  Putting on your shoes or socks: No difficulty  Squatting: A little bit of difficulty  Lifting an object, like a bag of groceries from the floor: No difficulty  Performing light activities around your home: A little bit of difficulty  Performing heavy activities around your home: A little bit of difficulty  Getting into or out of a car: A little bit of difficulty  Walking 2 blocks: No difficulty  Walking a mile: Extreme difficulty or unable to perform  activity  Going up or down 10 stairs (about 1 flight of stairs): A little bit of difficulty  Standing for 1 hour: No difficulty  Sitting for 1 hour: No difficulty  Running on even ground: Extreme difficulty or unable to perform activity  Running on uneven ground: Extreme difficulty or unable to perform activity  Making sharp turns while running fast: Extreme difficulty or unable to perform activity  Hopping: Extreme difficulty or unable to perform activity  Rolling over in bed: A little bit of difficulty  Total: 53  Timed Up and Go (TUG)  TUG Test 1: 20 seconds  TUG Test 2: 19 seconds  Timed Up and Go Comments: no AD      Time Calculation:     Start Time: 0936  Stop Time: 1015  Time Calculation (min): 39 min         PT G-Codes  Outcome Measure Options: Lower Extremity Functional Scale (LEFS), Dynamic Gait Index, Timed Up and Go (TUG)  Total: 53  TUG Test 1: 20 seconds  TUG Test 2: 19 seconds         Cary Ramon, PT  5/25/2022

## 2022-05-26 ENCOUNTER — HOSPITAL ENCOUNTER (OUTPATIENT)
Dept: PHYSICAL THERAPY | Facility: HOSPITAL | Age: 82
Setting detail: THERAPIES SERIES
Discharge: HOME | End: 2022-05-26

## 2022-05-26 DIAGNOSIS — R26.89 BALANCE PROBLEMS: Primary | ICD-10-CM

## 2022-05-26 PROCEDURE — 97112 NEUROMUSCULAR REEDUCATION: CPT

## 2022-05-26 PROCEDURE — 97110 THERAPEUTIC EXERCISES: CPT

## 2022-05-26 NOTE — THERAPY TREATMENT NOTE
Outpatient Physical Therapy Ortho Treatment Note  [facility]     Patient Name: Hailey Dickerson  : 1940  MRN: [id number]  Today's Date: 2022      Visit Date: 2022    Attendance: 2 visits  Subjective improvement: n/a  MD appt: PRN  Recheck due: 22    Visit Dx:    ICD-10-CM ICD-9-CM   1. Balance problems  R26.89 781.99       Patient Active Problem List   Diagnosis   • Essential hypertension   • Anxiety   • GERD (gastroesophageal reflux disease)   • Primary osteoarthritis of left knee   • Osteoarthritis of left knee   • Mixed hyperlipidemia   • Morbidly obese (HCC)        Past Medical History:   Diagnosis Date   • Anxiety    • Arthritis    • GERD (gastroesophageal reflux disease)    • Hypertension         Past Surgical History:   Procedure Laterality Date   • ANKLE SURGERY     • CHOLECYSTECTOMY     • DILATATION AND CURETTAGE     • TOTAL KNEE ARTHROPLASTY Left 2019    Procedure: TOTAL KNEE REPLACEMENT;  Surgeon: Rashaad Capps MD;  Location: [facility];  Service: Orthopedics        PT Ortho     Row Name 22 09       Subjective Comments    Subjective Comments pt states that she is doing okay today. States that she just gets tired easily.  -KM       Precautions and Contraindications    Precautions/Limitations no known precautions/limitations  -KM       Subjective Pain    Able to rate subjective pain? yes  -KM    Pre-Treatment Pain Level 0  -KM    Post-Treatment Pain Level 0  -KM       Posture/Observations    Posture/Observations Comments ambulates indpendently  -KM          User Key  (r) = Recorded By, (t) = Taken By, (c) = Cosigned By    Initials Name Provider Type     Marilyn Olivarez PTA Physical Therapist Assistant                             PT Assessment/Plan     Row Name 22 09          PT Assessment    Assessment Comments pt did well with treatment today. Able to add several new standing CKC activities with no issues. Checked O2 once  throughout treatment and was in upper 90s. Little fatigue noted and was able to keep good form overall.  -KM            PT Plan    PT Frequency 2x/week  -KM     PT Plan Comments Try standing hip abd next visit.  -KM           User Key  (r) = Recorded By, (t) = Taken By, (c) = Cosigned By    Initials Name Provider Type    Marilyn Granda, PTA Physical Therapist Assistant                   OP Exercises     Row Name 05/26/22 0900             Subjective Comments    Subjective Comments pt states that she is doing okay today. States that she just gets tired easily.  -KM              Subjective Pain    Able to rate subjective pain? yes  -KM      Pre-Treatment Pain Level 0  -KM      Post-Treatment Pain Level 0  -KM              Exercise 1    Exercise Name 1 PROII for ROM/end  -KM      Time 1 6 min  -KM      Additional Comments L2.0  -KM              Exercise 2    Exercise Name 2 Seated HS S  -KM      Reps 2 2  -KM      Time 2 30 sec hold  -KM              Exercise 3    Exercise Name 3 Incline gastroc S  -KM      Reps 3 2  -KM      Time 3 30 sec hold  -KM              Exercise 4    Exercise Name 4 Fwd/bkw walking @ HR  -KM      Sets 4 1  -KM      Reps 4 4 laps  -KM              Exercise 5    Exercise Name 5 Lateral side stepping @ HR  -KM      Sets 5 1  -KM      Reps 5 4 laps  -KM              Exercise 6    Exercise Name 6 Standing CR/TR  -KM      Sets 6 1  -KM      Reps 6 15  -KM              Exercise 7    Exercise Name 7 Standing alt march  -KM      Sets 7 1  -KM      Reps 7 15  -KM              Exercise 8    Exercise Name 8 Seated LAQ  -KM      Sets 8 1  -KM      Reps 8 15  -KM      Time 8 5 sec hold  -KM              Exercise 9    Exercise Name 9 Seated hip abd  -KM      Sets 9 1  -KM      Reps 9 15  -KM      Additional Comments red  -KM              Exercise 10    Exercise Name 10 Seated hip add squeezes  -KM      Sets 10 1  -KM      Reps 10 15  -KM      Time 10 5 sec hold  -KM              Exercise 11     "Exercise Name 11 Sit to stands from chair  -KM      Sets 11 1  -KM      Reps 11 15  -KM      Additional Comments no UE assist  -KM              Exercise 12    Exercise Name 12 Airex FT/EO  -KM      Reps 12 2  -KM      Time 12 30 sec hold  -KM              Exercise 13    Exercise Name 13 Tandem stance R/L lead  -KM      Sets 13 1  -KM      Reps 13 30 sec ea  -KM            User Key  (r) = Recorded By, (t) = Taken By, (c) = Cosigned By    Initials Name Provider Type    Marilyn Granda PTA Physical Therapist Assistant                              PT OP Goals     Row Name 05/26/22 0900          PT Short Term Goals    STG Date to Achieve 06/14/22  -KM     STG 1 Demonstrate independence/compliance in performance of initial HEP  -KM     STG 1 Progress Ongoing  -KM     STG 2 Demonstrate improved seated PN/postural positioning with stabilization/strengthening activities with minimal cues required to correct  -KM     STG 2 Progress Ongoing  -KM     STG 3 Improve TUG score to 16\" or less demonstrating improved functional mobility/dynamic balance  -KM     STG 3 Progress Ongoing  -KM     STG 4 Perform airex FT/EC static balance for 30\" with no LOB  -KM     STG 4 Progress Ongoing  -KM            Long Term Goals    LTG Date to Achieve 07/04/22  -KM     LTG 1 Subjectively report 50% overall improvement or greater  -KM     LTG 1 Progress Ongoing  -KM     LTG 2 Improve dynamic gait index score to 18/24 or greater demonstrating improved safety/performance with functional mobility  -KM     LTG 2 Progress Ongoing  -KM     LTG 3 Demonstrate improved bilateral hip MMT to 4+/5 or greater in all planes  -KM     LTG 3 Progress Ongoing  -KM     LTG 4 Ascend/descend trainng steps (4\"/6\") 1x5 with reciprocal pattern, good eccentric control, handrails prn  -KM     LTG 4 Progress Ongoing  -KM     LTG 5 Perform balance course consisting of bench step, hurdles, unlevel/compliant surface, cones with no LOB & good overall LE/core control  " -KM     LTG 5 Progress Ongoing  -KM     LTG 6 Tolerate 20 minutes of standing stabilization/strengthening activities with </= 2 seated reast breaks to demo improved endurance/activity tolerance  -KM     LTG 6 Progress Ongoing  -KM            Time Calculation    PT Goal Re-Cert Due Date 06/14/22  -KM           User Key  (r) = Recorded By, (t) = Taken By, (c) = Cosigned By    Initials Name Provider Type     Marilyn Ghosh PTA Physical Therapist Assistant                Therapy Education  Given: HEP, Symptoms/condition management, Posture/body mechanics, Mobility training  Program: New  How Provided: Verbal, Demonstration, Written  Provided to: Patient  Level of Understanding: Teach back education performed, Verbalized, Demonstrated              Time Calculation:   Start Time: 0933  Stop Time: 1016  Time Calculation (min): 43 min  Therapy Charges for Today     Code Description Service Date Service Provider Modifiers Qty    85725115691 HC PT THER PROC EA 15 MIN 5/26/2022 Marilyn Ghosh PTA GP, CQ 2    [id number]  PT NEUROMUSC RE EDUCATION EA 15 MIN 5/26/2022 Marilyn Ghosh PTA GP, CQ 1                    Marilyn Ghosh PTA  5/26/2022

## 2022-05-31 ENCOUNTER — HOSPITAL ENCOUNTER (OUTPATIENT)
Dept: PHYSICAL THERAPY | Facility: HOSPITAL | Age: 82
Setting detail: THERAPIES SERIES
Discharge: HOME | End: 2022-05-31

## 2022-05-31 DIAGNOSIS — R26.89 BALANCE PROBLEMS: Primary | ICD-10-CM

## 2022-05-31 PROCEDURE — 97112 NEUROMUSCULAR REEDUCATION: CPT

## 2022-05-31 PROCEDURE — 97110 THERAPEUTIC EXERCISES: CPT

## 2022-06-02 ENCOUNTER — HOSPITAL ENCOUNTER (OUTPATIENT)
Dept: PHYSICAL THERAPY | Facility: HOSPITAL | Age: 82
Setting detail: THERAPIES SERIES
Discharge: HOME | End: 2022-06-02

## 2022-06-02 DIAGNOSIS — R26.89 BALANCE PROBLEMS: Primary | ICD-10-CM

## 2022-06-02 PROCEDURE — 97110 THERAPEUTIC EXERCISES: CPT

## 2022-06-02 PROCEDURE — 97112 NEUROMUSCULAR REEDUCATION: CPT

## 2022-06-02 NOTE — THERAPY TREATMENT NOTE
"    Outpatient Physical Therapy Ortho Treatment Note  [facility], [place], operated by [facility]     Patient Name: Hailey Dickerson  : 1940  MRN: [id number]  Today's Date: 2022      Visit Date: 2022    Attendance: 4 visits  Subjective improvement: \"getting stronger\"  MD appt: PRN  Recheck due:     Visit Dx:    ICD-10-CM ICD-9-CM   1. Balance problems  R26.89 781.99       Patient Active Problem List   Diagnosis   • Essential hypertension   • Anxiety   • GERD (gastroesophageal reflux disease)   • Primary osteoarthritis of left knee   • Osteoarthritis of left knee   • Mixed hyperlipidemia   • Morbidly obese (HCC)        Past Medical History:   Diagnosis Date   • Anxiety    • Arthritis    • GERD (gastroesophageal reflux disease)    • Hypertension         Past Surgical History:   Procedure Laterality Date   • ANKLE SURGERY     • CHOLECYSTECTOMY     • DILATATION AND CURETTAGE     • TOTAL KNEE ARTHROPLASTY Left 2019    Procedure: TOTAL KNEE REPLACEMENT;  Surgeon: Rashaad Capps MD;  Location: [facility] OR;  Service: Orthopedics        PT Ortho     Row Name 22 1100       Precautions and Contraindications    Precautions/Limitations no known precautions/limitations  -KM       Posture/Observations    Posture/Observations Comments ambulates with no AD. no balance disturbances  -KM          User Key  (r) = Recorded By, (t) = Taken By, (c) = Cosigned By    Initials Name Provider Type    Marilyn Granda PTA Physical Therapist Assistant                             PT Assessment/Plan     Row Name 22 1100          PT Assessment    Assessment Comments pt did well today. Able to tolerate more CKC activities. pt only became mildly out of breath after lateral step ups. pts balance is doing well with no LOB occurances throughout treatment. Progressing with goals at this time.  -KM            PT Plan    PT Frequency 2x/week  -KM     PT Plan Comments Try FT/EC on airex next visit to address goal. Pt to " bring in current HEP to go over and update.  -KM           User Key  (r) = Recorded By, (t) = Taken By, (c) = Cosigned By    Initials Name Provider Type    Marilyn Granda PTA Physical Therapist Assistant                   OP Exercises     Row Name 06/02/22 1100             Subjective Comments    Subjective Comments pt states that she has been working on her exercises daily  -KM              Subjective Pain    Able to rate subjective pain? yes  -KM      Pre-Treatment Pain Level 0  -KM      Post-Treatment Pain Level 0  -KM              Exercise 1    Exercise Name 1 PROII for ROM/end  -KM      Time 1 8 min  -KM      Additional Comments L3.5  -KM              Exercise 2    Exercise Name 2 Seated HS S  -KM      Reps 2 2  -KM      Time 2 30 sec hold  -KM              Exercise 3    Exercise Name 3 Incline gastroc S  -KM      Reps 3 2  -KM      Time 3 30 sec hold  -KM              Exercise 4    Exercise Name 4 Airex CR/TR  -KM      Sets 4 2  -KM      Reps 4 10  -KM              Exercise 5    Exercise Name 5 Airex alt march  -KM      Sets 5 2  -KM      Reps 5 10  -KM              Exercise 6    Exercise Name 6 Standing alt hip abd  -KM      Sets 6 2  -KM      Reps 6 10  -KM              Exercise 7    Exercise Name 7 Standing alt hip ext  -KM      Sets 7 2  -KM      Reps 7 10  -KM              Exercise 8    Exercise Name 8 Fwd step ups  -KM      Sets 8 1  -KM      Reps 8 15  -KM      Additional Comments 4 inch; bilateral  -KM              Exercise 9    Exercise Name 9 Lateral step ups  -KM      Sets 9 1  -KM      Reps 9 15  -KM      Additional Comments 4 inch; bilateral  -KM              Exercise 10    Exercise Name 10 Sit to stands from chair  -KM      Sets 10 2  -KM      Reps 10 10  -KM      Additional Comments no UE assist  -KM              Exercise 11    Exercise Name 11 Airex beam side stepping  -KM      Sets 11 1  -KM      Reps 11 4 laps  -KM              Exercise 12    Exercise Name 12 Airex beam tandem  -KM  "     Reps 12 1x4 laps  -KM              Exercise 13    Exercise Name 13 Airex FA/EC  -KM      Reps 13 2  -KM      Time 13 30 sec hold  -KM            User Key  (r) = Recorded By, (t) = Taken By, (c) = Cosigned By    Initials Name Provider Type    Marilyn Granda, PTA Physical Therapist Assistant                              PT OP Goals     Row Name 06/02/22 1100          PT Short Term Goals    STG Date to Achieve 06/14/22  -KM     STG 1 Demonstrate independence/compliance in performance of initial HEP  -KM     STG 1 Progress Ongoing  -KM     STG 2 Demonstrate improved seated PN/postural positioning with stabilization/strengthening activities with minimal cues required to correct  -KM     STG 2 Progress Ongoing  -KM     STG 3 Improve TUG score to 16\" or less demonstrating improved functional mobility/dynamic balance  -KM     STG 3 Progress Ongoing  -KM     STG 4 Perform airex FT/EC static balance for 30\" with no LOB  -KM     STG 4 Progress Ongoing  -KM            Long Term Goals    LTG Date to Achieve 07/04/22  -KM     LTG 1 Subjectively report 50% overall improvement or greater  -KM     LTG 1 Progress Ongoing  -KM     LTG 2 Improve dynamic gait index score to 18/24 or greater demonstrating improved safety/performance with functional mobility  -KM     LTG 2 Progress Ongoing  -KM     LTG 3 Demonstrate improved bilateral hip MMT to 4+/5 or greater in all planes  -KM     LTG 3 Progress Ongoing  -KM     LTG 4 Ascend/descend trainng steps (4\"/6\") 1x5 with reciprocal pattern, good eccentric control, handrails prn  -KM     LTG 4 Progress Ongoing  -KM     LTG 5 Perform balance course consisting of bench step, hurdles, unlevel/compliant surface, cones with no LOB & good overall LE/core control  -KM     LTG 5 Progress Ongoing  -KM     LTG 6 Tolerate 20 minutes of standing stabilization/strengthening activities with </= 2 seated reast breaks to demo improved endurance/activity tolerance  -KM     LTG 6 Progress Ongoing "  -KM            Time Calculation    PT Goal Re-Cert Due Date 06/14/22  -KM           User Key  (r) = Recorded By, (t) = Taken By, (c) = Cosigned By    Initials Name Provider Type    Marilyn Granda PTA Physical Therapist Assistant                Therapy Education  Given: HEP, Symptoms/condition management, Posture/body mechanics, Mobility training  Program: New  How Provided: Verbal, Demonstration, Written  Provided to: Patient  Level of Understanding: Teach back education performed, Verbalized, Demonstrated              Time Calculation:   Start Time: 1100  Stop Time: 1155  Time Calculation (min): 55 min  Therapy Charges for Today     Code Description Service Date Service Provider Modifiers Qty    24437431170 HC PT THER PROC EA 15 MIN 6/2/2022 Marilyn Olivarez PTA GP, CQ 3    29195078315 HC PT NEUROMUSC RE EDUCATION EA 15 MIN 6/2/2022 Marilyn Olivarez PTA GP, CQ 1                    Marilyn Olivarez PTA  6/2/2022

## 2022-06-06 ENCOUNTER — HOSPITAL ENCOUNTER (OUTPATIENT)
Dept: PHYSICAL THERAPY | Facility: HOSPITAL | Age: 82
Setting detail: THERAPIES SERIES
Discharge: HOME | End: 2022-06-06

## 2022-06-06 DIAGNOSIS — R26.89 BALANCE PROBLEMS: Primary | ICD-10-CM

## 2022-06-06 PROCEDURE — 97110 THERAPEUTIC EXERCISES: CPT

## 2022-06-06 PROCEDURE — 97112 NEUROMUSCULAR REEDUCATION: CPT

## 2022-06-06 RX ORDER — INDAPAMIDE 2.5 MG/1
TABLET, FILM COATED ORAL
Qty: 36 TABLET | Refills: 3 | Status: SHIPPED | OUTPATIENT
Start: 2022-06-06 | End: 2023-02-06

## 2022-06-06 NOTE — THERAPY TREATMENT NOTE
"    Outpatient Physical Therapy Ortho Treatment Note  [facility]     Patient Name: Hailey Dickerson  : 1940  MRN: [id number]  Today's Date: 2022      Visit Date: 2022    Attendance: 5 visits  Subjective improvement: \"getting stronger\"  MD appt: PRN  Recheck due:     Visit Dx:    ICD-10-CM ICD-9-CM   1. Balance problems  R26.89 781.99       Patient Active Problem List   Diagnosis   • Essential hypertension   • Anxiety   • GERD (gastroesophageal reflux disease)   • Primary osteoarthritis of left knee   • Osteoarthritis of left knee   • Mixed hyperlipidemia   • Morbidly obese (HCC)        Past Medical History:   Diagnosis Date   • Anxiety    • Arthritis    • GERD (gastroesophageal reflux disease)    • Hypertension         Past Surgical History:   Procedure Laterality Date   • ANKLE SURGERY     • CHOLECYSTECTOMY     • DILATATION AND CURETTAGE     • TOTAL KNEE ARTHROPLASTY Left 2019    Procedure: TOTAL KNEE REPLACEMENT;  Surgeon: Rashaad Capps MD;  Location: [facility];  Service: Orthopedics        PT Ortho     Row Name 22       Subjective Comments    Subjective Comments pt states that she is doing okay today. Danny guzmán is still trying to get in and out of chairs.  -KM       Precautions and Contraindications    Precautions/Limitations no known precautions/limitations  -KM       Subjective Pain    Able to rate subjective pain? yes  -KM    Pre-Treatment Pain Level 0  -KM    Post-Treatment Pain Level 0  -KM       Posture/Observations    Posture/Observations Comments slow nirmala. No AD  -KM          User Key  (r) = Recorded By, (t) = Taken By, (c) = Cosigned By    Initials Name Provider Type     Marilyn Olivarez PTA Physical Therapist Assistant                             PT Assessment/Plan     Row Name 22          PT Assessment    Assessment Comments pt showing good improvement and able to increase reps on CKC activites with only mild " shortness of breath with step up activities. pt was challenged with low level obstacle course today- required CGA. Updated HEP and pt verbalized understanding  -KM            PT Plan    PT Frequency 2x/week  -KM     PT Plan Comments Cont balance course. Check TUG next visit  -KM           User Key  (r) = Recorded By, (t) = Taken By, (c) = Cosigned By    Initials Name Provider Type    Marilyn Granda, PTA Physical Therapist Assistant                   OP Exercises     Row Name 06/06/22 0900             Subjective Comments    Subjective Comments pt states that she is doing okay today. Danny guzmán is still trying to get in and out of chairs.  -KM              Subjective Pain    Able to rate subjective pain? yes  -KM      Pre-Treatment Pain Level 0  -KM      Post-Treatment Pain Level 0  -KM              Exercise 1    Exercise Name 1 PROII for ROM/end  -KM      Time 1 8 min  -KM      Additional Comments L4.0  -KM              Exercise 2    Exercise Name 2 Incline gastroc S  -KM      Reps 2 2  -KM      Time 2 30 sec hold  -KM              Exercise 3    Exercise Name 3 Standing HS S  -KM      Reps 3 2  -KM      Time 3 30 sec hold  -KM              Exercise 4    Exercise Name 4 Airex CR/TR  -KM      Sets 4 2  -KM      Reps 4 10  -KM              Exercise 5    Exercise Name 5 Airex alt march  -KM      Sets 5 2  -KM      Reps 5 10  -KM              Exercise 6    Exercise Name 6 Standing alt hip abd  -KM      Sets 6 2  -KM      Reps 6 10  -KM              Exercise 7    Exercise Name 7 Standing alt hip ext  -KM      Sets 7 2  -KM      Reps 7 10  -KM              Exercise 8    Exercise Name 8 Fwd step ups  -KM      Sets 8 2  -KM      Reps 8 10  -KM      Additional Comments 4 inch  -KM              Exercise 9    Exercise Name 9 Lateral step ups  -KM      Sets 9 2  -KM      Reps 9 10  -KM      Additional Comments 4 inch  -KM              Exercise 10    Exercise Name 10 Sit to stands from chair  -KM      Sets 10 2   "-KM      Reps 10 10  -KM      Additional Comments no UE assist  -KM              Exercise 11    Exercise Name 11 Airex beam side stepping  -KM      Sets 11 1  -KM      Reps 11 4 laps  -KM              Exercise 12    Exercise Name 12 Airex beam tandem  -KM      Reps 12 1x4 laps  -KM              Exercise 13    Exercise Name 13 Balance course: 4 inch bench, x1 jackie, airex, uneven mat, cones  -KM      Sets 13 1  -KM      Reps 13 1  -KM              Exercise 14    Exercise Name 14 Airex FT/EC  -KM      Reps 14 1  -KM      Time 14 30 sec  -KM      Additional Comments CGA  -KM            User Key  (r) = Recorded By, (t) = Taken By, (c) = Cosigned By    Initials Name Provider Type    Marilyn Granda, PTA Physical Therapist Assistant                              PT OP Goals     Row Name 06/06/22 0900          PT Short Term Goals    STG Date to Achieve 06/14/22  -KM     STG 1 Demonstrate independence/compliance in performance of initial HEP  -KM     STG 1 Progress Progressing  -KM     STG 2 Demonstrate improved seated PN/postural positioning with stabilization/strengthening activities with minimal cues required to correct  -KM     STG 2 Progress Ongoing  -KM     STG 3 Improve TUG score to 16\" or less demonstrating improved functional mobility/dynamic balance  -KM     STG 3 Progress Ongoing  -KM     STG 4 Perform airex FT/EC static balance for 30\" with no LOB  -KM     STG 4 Progress Met  -KM            Long Term Goals    LTG Date to Achieve 07/04/22  -KM     LTG 1 Subjectively report 50% overall improvement or greater  -KM     LTG 1 Progress Ongoing  -KM     LTG 2 Improve dynamic gait index score to 18/24 or greater demonstrating improved safety/performance with functional mobility  -KM     LTG 2 Progress Ongoing  -KM     LTG 3 Demonstrate improved bilateral hip MMT to 4+/5 or greater in all planes  -KM     LTG 3 Progress Ongoing  -KM     LTG 4 Ascend/descend trainng steps (4\"/6\") 1x5 with reciprocal pattern, good " eccentric control, handrails prn  -KM     LTG 4 Progress Ongoing  -KM     LTG 5 Perform balance course consisting of bench step, hurdles, unlevel/compliant surface, cones with no LOB & good overall LE/core control  -KM     LTG 5 Progress Ongoing  -KM     LTG 6 Tolerate 20 minutes of standing stabilization/strengthening activities with </= 2 seated reast breaks to demo improved endurance/activity tolerance  -KM     LTG 6 Progress Ongoing  -KM            Time Calculation    PT Goal Re-Cert Due Date 06/14/22  -KM           User Key  (r) = Recorded By, (t) = Taken By, (c) = Cosigned By    Initials Name Provider Type    Marilyn Granda PTA Physical Therapist Assistant                Therapy Education  Education Details: standing hip abd/ext, march  Given: HEP, Symptoms/condition management, Posture/body mechanics, Mobility training  Program: New  How Provided: Verbal, Demonstration, Written  Provided to: Patient  Level of Understanding: Teach back education performed, Verbalized, Demonstrated              Time Calculation:   Start Time: 0926  Stop Time: 1015  Time Calculation (min): 49 min  Therapy Charges for Today     Code Description Service Date Service Provider Modifiers Qty    76491420386 HC PT THER PROC EA 15 MIN 6/6/2022 Marilyn Olivarez PTA GP, CQ 2    [id number] HC PT NEUROMUSC RE EDUCATION EA 15 MIN 6/6/2022 Marilyn Olivarez PTA GP, CQ 1                    Marilyn Olivarez PTA  6/6/2022

## 2022-06-06 NOTE — TELEPHONE ENCOUNTER
Rx Refill Note  Requested Prescriptions     Pending Prescriptions Disp Refills   • indapamide (LOZOL) 2.5 MG tablet [Pharmacy Med Name: Indapamide 2.5 MG Oral Tablet] 36 tablet 0     Sig: TAKE 1 TABLET BY MOUTH ON MONDAY, WEDNESDAY AND FRIDAY      Last office visit with prescribing clinician: 5/16/2022      Next office visit with prescribing clinician: 11/21/22      {TIP  Please add Last Relevant Lab 5/16/22    La Nena Ha MA  06/06/22, 07:35 CDT

## 2022-06-08 ENCOUNTER — HOSPITAL ENCOUNTER (OUTPATIENT)
Dept: PHYSICAL THERAPY | Facility: HOSPITAL | Age: 82
Setting detail: THERAPIES SERIES
Discharge: HOME | End: 2022-06-08

## 2022-06-08 DIAGNOSIS — R26.89 BALANCE PROBLEMS: Primary | ICD-10-CM

## 2022-06-08 PROCEDURE — 97112 NEUROMUSCULAR REEDUCATION: CPT

## 2022-06-08 PROCEDURE — 97110 THERAPEUTIC EXERCISES: CPT

## 2022-06-08 NOTE — THERAPY TREATMENT NOTE
"    Outpatient Physical Therapy Ortho Treatment Note  [facility]     Patient Name: Hailey Dickerson  : 1940  MRN: [id number]  Today's Date: 2022      Visit Date: 2022    Attendance: 6 visits  Subjective improvement: \"getting stronger\"  MD appt: PRN  Recheck due:     Visit Dx:    ICD-10-CM ICD-9-CM   1. Balance problems  R26.89 781.99       Patient Active Problem List   Diagnosis   • Essential hypertension   • Anxiety   • GERD (gastroesophageal reflux disease)   • Primary osteoarthritis of left knee   • Osteoarthritis of left knee   • Mixed hyperlipidemia   • Morbidly obese (HCC)        Past Medical History:   Diagnosis Date   • Anxiety    • Arthritis    • GERD (gastroesophageal reflux disease)    • Hypertension         Past Surgical History:   Procedure Laterality Date   • ANKLE SURGERY     • CHOLECYSTECTOMY     • DILATATION AND CURETTAGE     • TOTAL KNEE ARTHROPLASTY Left 2019    Procedure: TOTAL KNEE REPLACEMENT;  Surgeon: Rashaad Capps MD;  Location: [facility];  Service: Orthopedics        PT Ortho     Row Name 22       Precautions and Contraindications    Precautions/Limitations no known precautions/limitations  -KM       Subjective Pain    Able to rate subjective pain? yes  -KM       Posture/Observations    Posture/Observations Comments No balance disturbances throughout. Cont with decrease step length/slow nirmala  -KM          User Key  (r) = Recorded By, (t) = Taken By, (c) = Cosigned By    Initials Name Provider Type    Marilyn Granda PTA Physical Therapist Assistant                             PT Assessment/Plan     Row Name 22 09          PT Assessment    Assessment Comments pt able to increase step height to 6 inch but required decrease reps. pt continues to have the most fatigue with step ups and sit to stands. Does well with low level balance course. pt compliant with HEP  -KM            PT Plan    PT Frequency 2x/week  -KM  "    PT Plan Comments Recheck next visit. Cont 6 inch step ups- with possibly increasing reps.  -KM           User Key  (r) = Recorded By, (t) = Taken By, (c) = Cosigned By    Initials Name Provider Type    Marilyn Granda, PTA Physical Therapist Assistant                   OP Exercises     Row Name 06/08/22 0900             Subjective Comments    Subjective Comments pt states she is doing well today.  -KM              Subjective Pain    Able to rate subjective pain? yes  -KM      Pre-Treatment Pain Level 0  -KM      Post-Treatment Pain Level 0  -KM              Exercise 1    Exercise Name 1 PROII for ROM/end  -KM      Time 1 8 min  -KM      Additional Comments L4.0  -KM              Exercise 2    Exercise Name 2 Incline gastroc S  -KM      Reps 2 2  -KM      Time 2 30 sec hold  -KM              Exercise 3    Exercise Name 3 Standing HS S  -KM      Reps 3 2  -KM      Time 3 30 sec hold  -KM              Exercise 4    Exercise Name 4 Airex CR/TR  -KM      Sets 4 2  -KM      Reps 4 10  -KM              Exercise 5    Exercise Name 5 Airex alt march  -KM      Sets 5 2  -KM      Reps 5 10  -KM              Exercise 6    Exercise Name 6 Standing alt hip abd  -KM      Sets 6 2  -KM      Reps 6 10  -KM              Exercise 7    Exercise Name 7 Standing alt hip ext  -KM      Sets 7 2  -KM      Reps 7 10  -KM              Exercise 8    Exercise Name 8 Fwd step ups  -KM      Sets 8 1  -KM      Reps 8 10  -KM      Additional Comments 6 inch; bilateral  -KM              Exercise 9    Exercise Name 9 Lateral step ups  -KM      Sets 9 1  -KM      Reps 9 10  -KM      Additional Comments 6 inch; bilateral  -KM              Exercise 10    Exercise Name 10 Sit to stands from chair  -KM      Sets 10 2  -KM      Reps 10 10  -KM      Additional Comments no UE assist  -KM              Exercise 11    Exercise Name 11 Airex beam side stepping  -KM      Sets 11 1  -KM      Reps 11 4  -KM              Exercise 12    Exercise Name 12  "Airex beam tandem  -KM      Reps 12 1x4 laps  -KM              Exercise 13    Exercise Name 13 Balance course: 4 inch bench, x1 jackie, airex, uneven mat, cones  -KM      Sets 13 1  -KM      Reps 13 1  -KM              Exercise 14    Exercise Name 14 TUG  -KM      Time 14 14 sec  -KM            User Key  (r) = Recorded By, (t) = Taken By, (c) = Cosigned By    Initials Name Provider Type    Marilyn Granda, PTA Physical Therapist Assistant                              PT OP Goals     Row Name 06/08/22 0900          PT Short Term Goals    STG Date to Achieve 06/14/22  -KM     STG 1 Demonstrate independence/compliance in performance of initial HEP  -KM     STG 1 Progress Met  -KM     STG 2 Demonstrate improved seated PN/postural positioning with stabilization/strengthening activities with minimal cues required to correct  -KM     STG 2 Progress Ongoing  -KM     STG 3 Improve TUG score to 16\" or less demonstrating improved functional mobility/dynamic balance  14 sec  -KM     STG 3 Progress Met  -KM     STG 4 Perform airex FT/EC static balance for 30\" with no LOB  -KM     STG 4 Progress Met  -KM            Long Term Goals    LTG Date to Achieve 07/04/22  -KM     LTG 1 Subjectively report 50% overall improvement or greater  -KM     LTG 1 Progress Ongoing  -KM     LTG 2 Improve dynamic gait index score to 18/24 or greater demonstrating improved safety/performance with functional mobility  -KM     LTG 2 Progress Ongoing  -KM     LTG 3 Demonstrate improved bilateral hip MMT to 4+/5 or greater in all planes  -KM     LTG 3 Progress Ongoing  -KM     LTG 4 Ascend/descend trainng steps (4\"/6\") 1x5 with reciprocal pattern, good eccentric control, handrails prn  -KM     LTG 4 Progress Ongoing  -KM     LTG 5 Perform balance course consisting of bench step, hurdles, unlevel/compliant surface, cones with no LOB & good overall LE/core control  -KM     LTG 5 Progress Ongoing  -KM     LTG 6 Tolerate 20 minutes of standing " stabilization/strengthening activities with </= 2 seated reast breaks to demo improved endurance/activity tolerance  -KM     LTG 6 Progress Ongoing  -KM            Time Calculation    PT Goal Re-Cert Due Date 06/14/22  -KM           User Key  (r) = Recorded By, (t) = Taken By, (c) = Cosigned By    Initials Name Provider Type    Marilyn Granda PTA Physical Therapist Assistant                Therapy Education  Given: HEP, Symptoms/condition management, Posture/body mechanics, Mobility training  Program: New  How Provided: Verbal, Demonstration, Written  Provided to: Patient  Level of Understanding: Teach back education performed, Verbalized, Demonstrated              Time Calculation:   Start Time: 0932  Stop Time: 1019  Time Calculation (min): 47 min  Therapy Charges for Today     Code Description Service Date Service Provider Modifiers Qty    53631240168 HC PT THER PROC EA 15 MIN 6/8/2022 Marilyn Olivarez PTA GP, CQ 2    [id number] HC PT NEUROMUSC RE EDUCATION EA 15 MIN 6/8/2022 Marilyn Olivarez PTA GP, CQ 1                    Marilyn Olivarez PTA  6/8/2022

## 2022-06-13 ENCOUNTER — HOSPITAL ENCOUNTER (OUTPATIENT)
Dept: PHYSICAL THERAPY | Facility: HOSPITAL | Age: 82
Setting detail: THERAPIES SERIES
Discharge: HOME | End: 2022-06-13

## 2022-06-13 DIAGNOSIS — R26.89 BALANCE PROBLEMS: Primary | ICD-10-CM

## 2022-06-13 PROCEDURE — 97530 THERAPEUTIC ACTIVITIES: CPT | Performed by: PHYSICAL THERAPIST

## 2022-06-13 PROCEDURE — 97110 THERAPEUTIC EXERCISES: CPT | Performed by: PHYSICAL THERAPIST

## 2022-06-13 NOTE — THERAPY PROGRESS REPORT/RE-CERT
Outpatient Physical Therapy Ortho Progress Note  [facility]     Patient Name: Hailey Dickerson  : 1940  MRN: [id number]  Today's Date: 2022      Visit Date: 2022     Attendance: 7 visits  Subjective improvement: 70% improvement  MD appt: PRN  Recheck due: 2022    Visit Dx:    ICD-10-CM ICD-9-CM   1. Balance problems  R26.89 781.99       Patient Active Problem List   Diagnosis   • Essential hypertension   • Anxiety   • GERD (gastroesophageal reflux disease)   • Primary osteoarthritis of left knee   • Osteoarthritis of left knee   • Mixed hyperlipidemia   • Morbidly obese (HCC)        Past Medical History:   Diagnosis Date   • Anxiety    • Arthritis    • GERD (gastroesophageal reflux disease)    • Hypertension         Past Surgical History:   Procedure Laterality Date   • ANKLE SURGERY     • CHOLECYSTECTOMY     • DILATATION AND CURETTAGE     • TOTAL KNEE ARTHROPLASTY Left 2019    Procedure: TOTAL KNEE REPLACEMENT;  Surgeon: Rashaad Capps MD;  Location: [facility];  Service: Orthopedics        PT Ortho     Row Name 22 0900       Precautions and Contraindications    Precautions/Limitations no known precautions/limitations  -KG       Posture/Observations    Posture/Observations Comments Decreased overall posutral awareness. Slightly rounded shoulders posutre. No balance disturbances throughout treatment.  -KG       General ROM    GENERAL ROM COMMENTS BUE AROM WFL at shoulder, elbow. Bilateral hip, knee, ankle AROM WFL  -KG       MMT Right Upper Ext    Rt Shoulder Flexion MMT, Gross Movement (5/5) normal  -KG    Rt Shoulder ABduction MMT, Gross Movement (5/5) normal  -KG    Rt Elbow Flexion MMT, Gross Movement: (5/5) normal  -KG    Rt Elbow Extension MMT, Gross Movement: (5/5) normal  -KG       MMT Left Upper Ext    Lt Shoulder Flexion MMT, Gross Movement (5/5) normal  -KG    Lt Shoulder ABduction MMT, Gross Movement (5/5) normal  -KG    Lt Elbow Flexion MMT, Gross  "Movement (5/5) normal  -KG    Lt Elbow Extension MMT, Gross Movement (5/5) normal  -KG       MMT Right Lower Ext    Rt Hip Flexion MMT, Gross Movement (4+/5) good plus  -KG    Rt Hip Extension MMT, Gross Movement (4/5) good  -KG    Rt Hip ABduction MMT, Gross Movement (4/5) good  -KG    Rt Hip ADduction MMT, Gross Movement (4+/5) good plus  -KG    Rt Knee Extension MMT, Gross Movement (5/5) normal  -KG    Rt Knee Flexion MMT, Gross Movement (5/5) normal  -KG    Rt Ankle Plantarflexion MMT, Gross Movement (5/5) normal  -KG    Rt Ankle Dorsiflexion MMT, Gross Movement (5/5) normal  -KG       MMT Left Lower Ext    Lt Hip Flexion MMT, Gross Movement (4+/5) good plus  -KG    Lt Hip Extension MMT, Gross Movement (4/5) good  -KG    Lt Hip ABduction MMT, Gross Movement (4/5) good  -KG    Lt Hip ADduction MMT, Gross Movement (4+/5) good plus  -KG    Lt Knee Extension MMT, Gross Movement (5/5) normal  -KG    Lt Knee Flexion MMT, Gross Movement (5/5) normal  -KG    Lt Ankle Plantarflexion MMT, Gross Movement (5/5) normal  -KG    Lt Ankle Dorsiflexion MMT, Gross Movement (5/5) normal  -KG       Sensation    Sensation WNL? WFL  -KG    Light Touch No apparent deficits  -KG       Lower Extremity Flexibility    Hamstrings Bilateral:;Mildly limited  -KG    Hip Flexors Bilateral:;Mildly limited  -KG    Gastrocnemius Bilateral:;Mildly limited  -KG       Balance Skills Training    Balance Comments Level ground: FA/EO 30\", FT/EC: 30\". Airex: FA/EO 30\", FT/EC 30\" with increased sway. Balance course: cues needed step closer to hurdles before stepping over them;  Minimal compensation; cautious walking over unlevel mat but no LOB; CGA throughout. See functional outcome measures section for DGI and TUG test results.  -KG       Gait/Stairs (Locomotion)    Comment, (Gait/Stairs) Ambulates with no AD. Decreased nirmala but improving. Improving arm swing but still keeps left arm slightly away from midline. Improving step length & step height but " still shortened. Manages training steps in reciprocal pattern with both ascend & descent but requires Bilateral rail assist  -KG          User Key  (r) = Recorded By, (t) = Taken By, (c) = Cosigned By    Initials Name Provider Type    KG Cary Ramon, PT Physical Therapist                             PT Assessment/Plan     Row Name 06/13/22 0900          PT Assessment    Functional Limitations Impaired gait;Limitation in home management;Limitations in community activities;Performance in leisure activities  -KG     Impairments Balance;Gait;Impaired flexibility;Impaired muscle endurance;Muscle strength;Poor body mechanics;Posture  -KG     Assessment Comments Pt is making good progress overall with PT as evidenced by improvements in BLE functional strength, dynamic balance, & activity tolerance. Overall pt not as fatigued and does get SOA as easily with therex activities. Was able to increase reps on step ups. Did well with initial resisted gait performance. More challenged eccentrically with backwards walking but did well with cues. Doing better with low level balance course; needs to cues to step closer to hurdles before stepping over and cautious walking over unlevel mat. CGA required but had no LOB and did well. Pt reports 70% overall improvement and is making good gains towards goals. Still needs work on functional strength, dynamic balance/stability, gait performance & functional activity tolerance & will benefit from continued skilled PT services.  -KG     Rehab Potential Good  -KG     Patient/caregiver participated in establishment of treatment plan and goals Yes  -KG     Patient would benefit from skilled therapy intervention Yes  -KG            PT Plan    PT Frequency 2x/week  -KG     Predicted Duration of Therapy Intervention (PT) 6 more visits  -KG     PT Plan Comments Continue progressing balance & strengthening activities. Continue resisted gait. Resume airex beam activities. Continue step up  "progressions.  -KG           User Key  (r) = Recorded By, (t) = Taken By, (c) = Cosigned By    Initials Name Provider Type    KG Cary Ramon, PT Physical Therapist                   OP Exercises     Row Name 06/13/22 0900             Subjective Comments    Subjective Comments Pt states things overall are going well. states she's not getting as short of breath wtih activities at home. Getting stronger. states her balance still can be better but has seen some improvement. Reports 70% overall improvement.  -KG              Subjective Pain    Able to rate subjective pain? yes  -KG      Pre-Treatment Pain Level 0  -KG      Post-Treatment Pain Level 0  -KG              Exercise 1    Exercise Name 1 PROII for ROM/end  -KG      Time 1 8 min  -KG      Additional Comments L 4.0  -KG              Exercise 2    Exercise Name 2 Incline gastroc S  -KG      Reps 2 2  -KG      Time 2 30 sec hold  -KG              Exercise 3    Exercise Name 3 Standing HS S  -KG      Reps 3 1 ea  -KG      Time 3 30 sec hold  -KG              Exercise 4    Exercise Name 4 Dynamic gait index  -KG              Exercise 5    Exercise Name 5 Airex standing CR/TR  -KG      Cueing 5 Verbal  -KG      Sets 5 1  -KG      Reps 5 15  -KG              Exercise 6    Exercise Name 6 Airex standing alt marching  -KG      Cueing 6 Verbal  -KG      Sets 6 1  -KG      Reps 6 15  -KG              Exercise 7    Exercise Name 7 Balance course: 4\" step, 3 hurdles spaced apart, unlevel mat, around cone  -KG      Cueing 7 Verbal;Tactile  -KG      Sets 7 1  -KG      Reps 7 2 laps  -KG      Additional Comments CGA  -KG              Exercise 8    Exercise Name 8 BLE strength measurements  -KG              Exercise 9    Exercise Name 9 CC resisted gait fwd, bwd  -KG      Cueing 9 Verbal;Tactile  -KG      Sets 9 1  -KG      Reps 9 4 laps  -KG      Additional Comments 1 plate  -KG              Exercise 10    Exercise Name 10 Fwd step ups  -KG      Cueing 10 Verbal  -KG      " "Sets 10 1  -KG      Reps 10 15 ea LE  -KG      Additional Comments 6\" step  -KG              Exercise 11    Exercise Name 11 Lateral step up/over  -KG      Cueing 11 Verbal  -KG      Sets 11 1  -KG      Reps 11 10  -KG      Additional Comments 6\" step melia rail assist  -KG              Exercise 12    Exercise Name 12 Sit <> stands from chair  -KG      Cueing 12 Verbal  -KG      Reps 12 2x10  -KG      Additional Comments No UE assist  -KG            User Key  (r) = Recorded By, (t) = Taken By, (c) = Cosigned By    Initials Name Provider Type    KG Tristen Cary ANASTASIIA, PT Physical Therapist                              PT OP Goals     Row Name 06/13/22 0900          PT Short Term Goals    STG Date to Achieve 06/14/22  -KG     STG 1 Demonstrate independence/compliance in performance of initial HEP  -KG     STG 1 Progress Met  -KG     STG 2 Demonstrate improved seated PN/postural positioning with stabilization/strengthening activities with minimal cues required to correct  -KG     STG 2 Progress Met;Ongoing  -KG     STG 3 Improve TUG score to 16\" or less demonstrating improved functional mobility/dynamic balance  14 sec  -KG     STG 3 Progress Met  -KG     STG 4 Perform airex FT/EC static balance for 30\" with no LOB  -KG     STG 4 Progress Met  -KG            Long Term Goals    LTG Date to Achieve 07/04/22  -KG     LTG 1 Subjectively report 50% overall improvement or greater  -KG     LTG 1 Progress Met  -KG     LTG 2 Improve dynamic gait index score to 18/24 or greater demonstrating improved safety/performance with functional mobility  -KG     LTG 2 Progress Ongoing;Progressing  17/24  -KG     LTG 3 Demonstrate improved bilateral hip MMT to 4+/5 or greater in all planes  -KG     LTG 3 Progress Partially Met;Progressing  Met flex, progressing in abd  -KG     LTG 4 Ascend/descend trainng steps (4\"/6\") 1x5 with reciprocal pattern, good eccentric control, handrails prn  -KG     LTG 4 Progress Ongoing;Progressing  -KG     LTG 5 " Perform balance course consisting of bench step, hurdles, unlevel/compliant surface, cones with no LOB & good overall LE/core control  -KG     LTG 5 Progress Ongoing;Progressing  -KG     LTG 6 Tolerate 20 minutes of standing stabilization/strengthening activities with </= 2 seated reast breaks to demo improved endurance/activity tolerance  -KG     LTG 6 Progress Ongoing;Progressing  -KG            Time Calculation    PT Goal Re-Cert Due Date 07/04/22  -KG           User Key  (r) = Recorded By, (t) = Taken By, (c) = Cosigned By    Initials Name Provider Type    Cary Buenrostro, PT Physical Therapist                Therapy Education  Given: HEP, Symptoms/condition management, Posture/body mechanics, Mobility training  Program: Reinforced  How Provided: Verbal, Demonstration, Written  Provided to: Patient  Level of Understanding: Teach back education performed, Verbalized, Demonstrated    Outcome Measure Options: Lower Extremity Functional Scale (LEFS), Dynamic Gait Index, Timed Up and Go (TUG)  Dynamic Gait Index (DGI)  Gait Level Surface: Mild impairment: walks 20’, uses assistive devices, slower speed, mild gait deviations  Change in Gait Speed: Mild impairment: Is able to change speed but demonstrates mild gait deviations, or no gait deviations but unable to achieve a significant change in velocity, or uses as assistive device  Gait with Horizontal Head Turns: Mild impairment: Performs head turns smoothly with slight change in gait velocity, i.e. minor disruption to smooth gait path or uses walking aid.  Gait with Vertical Head Turns: Mild impairment: Performs head turns smoothly with slight change in gait velocity, i.e. minor disruption to smooth gait path or uses walking aid.  Gait and Pivot Turn: Mild impairment: pivot turns safely in >3 seconds and stops with no loss of balance.  Step Over Obstacle: Mild impairment: Is able to step over shoe box, but must slow down and adjust steps to clear box  safely.  Step Around Obstacles: Normal: Is able to walk safely around cones safely without changing gait speed, no evidence of imbalance.  Steps: Mild impairment: Alternating feet, must use rail.  Dynamic Gait Index Score: 17  Dynamic Gait Index Comments: </=19/24 increased fall risk in elderly  Lower Extremity Functional Index  Any of your usual work, housework or school activities: A little bit of difficulty  Your usual hobbies, recreational or sporting activities: A little bit of difficulty  Getting into or out of the bath: No difficulty  Walking between rooms: No difficulty  Putting on your shoes or socks: A little bit of difficulty  Squatting: No difficulty  Lifting an object, like a bag of groceries from the floor: No difficulty  Performing light activities around your home: No difficulty  Performing heavy activities around your home: A little bit of difficulty  Getting into or out of a car: A little bit of difficulty  Walking 2 blocks: A little bit of difficulty  Walking a mile: A little bit of difficulty  Going up or down 10 stairs (about 1 flight of stairs): Moderate difficulty  Standing for 1 hour: No difficulty  Sitting for 1 hour: No difficulty  Running on even ground: Extreme difficulty or unable to perform activity  Running on uneven ground: Extreme difficulty or unable to perform activity  Making sharp turns while running fast: Extreme difficulty or unable to perform activity  Hopping: Extreme difficulty or unable to perform activity  Rolling over in bed: No difficulty  Total: 55  Timed Up and Go (TUG)  TUG Test 1: 15 seconds  TUG Test 2: 14 seconds  Timed Up and Go Comments: no AD      Time Calculation:   Start Time: 0920  Stop Time: 1002  Time Calculation (min): 42 min  Therapy Charges for Today     Code Description Service Date Service Provider Modifiers Qty    44890295640  PT THER PROC EA 15 MIN 6/13/2022 Cary Ramon, PT GP 2    [id number]  PT THERAPEUTIC ACT EA 15 MIN 6/13/2022 Tristen  Cary LAURENT, PT GP 1          PT G-Codes  Outcome Measure Options: Lower Extremity Functional Scale (LEFS), Dynamic Gait Index, Timed Up and Go (TUG)  Total: 55  TUG Test 1: 15 seconds  TUG Test 2: 14 seconds         Cary Ramon, PT  6/13/2022

## 2022-06-16 ENCOUNTER — HOSPITAL ENCOUNTER (OUTPATIENT)
Dept: PHYSICAL THERAPY | Facility: HOSPITAL | Age: 82
Setting detail: THERAPIES SERIES
Discharge: HOME | End: 2022-06-16

## 2022-06-16 DIAGNOSIS — R26.89 BALANCE PROBLEMS: Primary | ICD-10-CM

## 2022-06-16 PROCEDURE — 97112 NEUROMUSCULAR REEDUCATION: CPT

## 2022-06-16 PROCEDURE — 97110 THERAPEUTIC EXERCISES: CPT

## 2022-06-16 NOTE — THERAPY TREATMENT NOTE
Outpatient Physical Therapy Ortho Treatment Note  [facility]     Patient Name: Hailey Dickerson  : 1940  MRN: [id number]  Today's Date: 2022      Visit Date: 2022    Attendance: 8 visits  Subjective improvement: 70% improvement  MD appt: PRN  Recheck due:     Visit Dx:    ICD-10-CM ICD-9-CM   1. Balance problems  R26.89 781.99       Patient Active Problem List   Diagnosis   • Essential hypertension   • Anxiety   • GERD (gastroesophageal reflux disease)   • Primary osteoarthritis of left knee   • Osteoarthritis of left knee   • Mixed hyperlipidemia   • Morbidly obese (HCC)        Past Medical History:   Diagnosis Date   • Anxiety    • Arthritis    • GERD (gastroesophageal reflux disease)    • Hypertension         Past Surgical History:   Procedure Laterality Date   • ANKLE SURGERY     • CHOLECYSTECTOMY     • DILATATION AND CURETTAGE     • TOTAL KNEE ARTHROPLASTY Left 2019    Procedure: TOTAL KNEE REPLACEMENT;  Surgeon: Rashaad Capps MD;  Location: [facility] OR;  Service: Orthopedics        PT Ortho     Row Name 22 1100       Precautions and Contraindications    Precautions/Limitations no known precautions/limitations  -KM       Subjective Pain    Able to rate subjective pain? yes  -KM          User Key  (r) = Recorded By, (t) = Taken By, (c) = Cosigned By    Initials Name Provider Type     Marilyn Olivarez PTA Physical Therapist Assistant                             PT Assessment/Plan     Row Name 22 1100          PT Assessment    Assessment Comments pt was challenged with recip stairs and required cues as pt is used to ascending/descedning in step to pattern. pt also navigates small hurdles in step to pattern as well. pt still requiring intermittent seated rest breaks.  -KM            PT Plan    PT Frequency 2x/week  -KM     PT Plan Comments Try to resume resisted gait and begin leg press when AC is fixed in fitness. Cont working on recip stairs   -KM           User Key  (r) = Recorded By, (t) = Taken By, (c) = Cosigned By    Initials Name Provider Type    Marilyn Granda, PTA Physical Therapist Assistant                   OP Exercises     Row Name 06/16/22 1100             Subjective Comments    Subjective Comments pt states she is doing well. No complaints today  -KM              Subjective Pain    Able to rate subjective pain? yes  -KM      Pre-Treatment Pain Level 0  -KM      Post-Treatment Pain Level 0  -KM              Exercise 1    Exercise Name 1 PROII for ROM/end  -KM      Time 1 8 min  -KM      Additional Comments L4.0  -KM              Exercise 2    Exercise Name 2 Incline gastroc S  -KM      Reps 2 2  -KM      Time 2 30 sec hold  -KM              Exercise 3    Exercise Name 3 Standing HS S  -KM      Reps 3 2  -KM      Time 3 30 sec hold  -KM              Exercise 4    Exercise Name 4 Airex CR/TR  -KM      Sets 4 2  -KM      Reps 4 10  -KM              Exercise 5    Exercise Name 5 Airex alt march  -KM      Sets 5 2  -KM      Reps 5 10  -KM              Exercise 6    Exercise Name 6 Fwd step ups  -KM      Sets 6 2  -KM      Reps 6 10  -KM      Additional Comments 6 inch  -KM              Exercise 7    Exercise Name 7 Lateral step ups  -KM      Sets 7 2  -KM      Reps 7 10  -KM      Additional Comments 6 inch  -KM              Exercise 8    Exercise Name 8 Balance course: airex, 2 hurdles, airex, 2 hurdes  -KM      Sets 8 1  -KM      Reps 8 2 laps  -KM              Exercise 9    Exercise Name 9 Sit to stands  -KM      Sets 9 2  -KM      Reps 9 10  -KM              Exercise 10    Exercise Name 10 Airex beam side stepping w/ tband loop  -KM      Sets 10 1  -KM      Reps 10 4 laps  -KM      Additional Comments red loop  -KM              Exercise 11    Exercise Name 11 Airex beam tandem  -KM      Sets 11 1  -KM      Reps 11 4 laps  -KM              Exercise 12    Exercise Name 12 Recip stairs  -KM      Reps 12 1x3  -KM      Additional Comments  "B UE assist  -KM            User Key  (r) = Recorded By, (t) = Taken By, (c) = Cosigned By    Initials Name Provider Type    Marilyn Granda, PTA Physical Therapist Assistant                              PT OP Goals     Row Name 06/16/22 1100          PT Short Term Goals    STG Date to Achieve 06/14/22  -KM     STG 1 Demonstrate independence/compliance in performance of initial HEP  -KM     STG 1 Progress Met  -KM     STG 2 Demonstrate improved seated PN/postural positioning with stabilization/strengthening activities with minimal cues required to correct  -KM     STG 2 Progress Met;Ongoing  -KM     STG 3 Improve TUG score to 16\" or less demonstrating improved functional mobility/dynamic balance  14 sec  -KM     STG 3 Progress Met  -KM     STG 4 Perform airex FT/EC static balance for 30\" with no LOB  -KM     STG 4 Progress Met  -KM            Long Term Goals    LTG Date to Achieve 07/04/22  -KM     LTG 1 Subjectively report 50% overall improvement or greater  -KM     LTG 1 Progress Met  -KM     LTG 2 Improve dynamic gait index score to 18/24 or greater demonstrating improved safety/performance with functional mobility  -KM     LTG 2 Progress Ongoing;Progressing  17/24  -KM     LTG 3 Demonstrate improved bilateral hip MMT to 4+/5 or greater in all planes  -KM     LTG 3 Progress Partially Met;Progressing  Met flex, progressing in abd  -KM     LTG 4 Ascend/descend trainng steps (4\"/6\") 1x5 with reciprocal pattern, good eccentric control, handrails prn  -KM     LTG 4 Progress Ongoing;Progressing  -KM     LTG 5 Perform balance course consisting of bench step, hurdles, unlevel/compliant surface, cones with no LOB & good overall LE/core control  -KM     LTG 5 Progress Ongoing;Progressing  -KM     LTG 6 Tolerate 20 minutes of standing stabilization/strengthening activities with </= 2 seated reast breaks to demo improved endurance/activity tolerance  -KM     LTG 6 Progress Ongoing;Progressing  -KM            Time " Calculation    PT Goal Re-Cert Due Date 07/04/22  -           User Key  (r) = Recorded By, (t) = Taken By, (c) = Cosigned By    Initials Name Provider Type    Marilyn Granda PTA Physical Therapist Assistant                Therapy Education  Given: HEP, Symptoms/condition management, Posture/body mechanics, Mobility training  Program: Reinforced  How Provided: Verbal, Demonstration, Written  Provided to: Patient  Level of Understanding: Teach back education performed, Verbalized, Demonstrated              Time Calculation:   Start Time: 1100  Stop Time: 1200  Time Calculation (min): 60 min  Therapy Charges for Today     Code Description Service Date Service Provider Modifiers Qty    [id number] HC PT THER PROC EA 15 MIN 6/16/2022 Marilyn Olivarez PTA GP, CQ 3    [id number]  PT NEUROMUSC RE EDUCATION EA 15 MIN 6/16/2022 Marilyn Olivarez PTA GP, CQ 1                    Marilyn Olivarez PTA  6/16/2022

## 2022-06-20 ENCOUNTER — HOSPITAL ENCOUNTER (OUTPATIENT)
Dept: PHYSICAL THERAPY | Facility: HOSPITAL | Age: 82
Setting detail: THERAPIES SERIES
Discharge: HOME | End: 2022-06-20

## 2022-06-20 DIAGNOSIS — R26.89 BALANCE PROBLEMS: Primary | ICD-10-CM

## 2022-06-20 PROCEDURE — 97110 THERAPEUTIC EXERCISES: CPT

## 2022-06-20 PROCEDURE — 97112 NEUROMUSCULAR REEDUCATION: CPT

## 2022-06-20 RX ORDER — FAMOTIDINE 20 MG/1
TABLET, FILM COATED ORAL
Qty: 180 TABLET | Refills: 3 | Status: SHIPPED | OUTPATIENT
Start: 2022-06-20

## 2022-06-20 NOTE — THERAPY TREATMENT NOTE
Outpatient Physical Therapy Ortho Treatment Note  [facility]     Patient Name: Hailey Dickerson  : 1940  MRN: [id number]  Today's Date: 2022      Visit Date: 2022    Attendance: 9 visits  Subjective improvement: 70% improvement  MD appt: PRN  Recheck due:     Visit Dx:    ICD-10-CM ICD-9-CM   1. Balance problems  R26.89 781.99       Patient Active Problem List   Diagnosis   • Essential hypertension   • Anxiety   • GERD (gastroesophageal reflux disease)   • Primary osteoarthritis of left knee   • Osteoarthritis of left knee   • Mixed hyperlipidemia   • Morbidly obese (HCC)        Past Medical History:   Diagnosis Date   • Anxiety    • Arthritis    • GERD (gastroesophageal reflux disease)    • Hypertension         Past Surgical History:   Procedure Laterality Date   • ANKLE SURGERY     • CHOLECYSTECTOMY     • DILATATION AND CURETTAGE     • TOTAL KNEE ARTHROPLASTY Left 2019    Procedure: TOTAL KNEE REPLACEMENT;  Surgeon: Rashaad Capps MD;  Location: [facility] OR;  Service: Orthopedics        PT Ortho     Row Name 22 09       Precautions and Contraindications    Precautions/Limitations no known precautions/limitations  -KM       Subjective Pain    Able to rate subjective pain? yes  -KM          User Key  (r) = Recorded By, (t) = Taken By, (c) = Cosigned By    Initials Name Provider Type     Marilyn Olivarez PTA Physical Therapist Assistant                             PT Assessment/Plan     Row Name 22 09          PT Assessment    Assessment Comments able to resume CC and cybex strengthening as well as balance course today. pt is challenged with ecc control bilaterally with both fwd step up and overs as well as recip stairs. Also requires cues for sequence as pt tends to want to do step to pattern.  -KM            PT Plan    PT Frequency 2x/week  -KM     PT Plan Comments cont CC. Try Cybex LP1 next visit  -KM           User Key  (r) = Recorded By,  (t) = Taken By, (c) = Cosigned By    Initials Name Provider Type    Marilyn Granda PTA Physical Therapist Assistant                   OP Exercises     Row Name 06/20/22 0900             Subjective Comments    Subjective Comments pt states that she is tired, had a busy weekend  -KM              Subjective Pain    Able to rate subjective pain? yes  -KM      Pre-Treatment Pain Level 0  -KM      Post-Treatment Pain Level 0  -KM              Exercise 1    Exercise Name 1 PROII for ROM/end  -KM      Time 1 8 MIN  -KM      Additional Comments L4.0  -KM              Exercise 2    Exercise Name 2 Incline gastroc S  -KM      Reps 2 2  -KM      Time 2 30 sec hold  -KM              Exercise 3    Exercise Name 3 Standing HS S  -KM      Reps 3 2  -KM      Time 3 30 sec hold  -KM              Exercise 4    Exercise Name 4 Airex CR/TR  -KM      Sets 4 2  -KM      Reps 4 10  -KM              Exercise 5    Exercise Name 5 Airex alt march  -KM      Sets 5 2  -KM      Reps 5 10  -KM              Exercise 6    Exercise Name 6 Fwd step up and over  -KM      Sets 6 1  -KM      Reps 6 15 ea LE  -KM      Additional Comments 6 inch  -KM              Exercise 7    Exercise Name 7 Lateral step up and over  -KM      Sets 7 1  -KM      Reps 7 15  -KM      Additional Comments 6 inch  -KM              Exercise 8    Exercise Name 8 Recip stairs  -KM      Sets 8 1  -KM      Reps 8 5  -KM              Exercise 9    Exercise Name 9 CC: resisted gait 4 way  -KM      Sets 9 1  -KM      Reps 9 4 laps  -KM      Additional Comments 1.5 pl  -KM            User Key  (r) = Recorded By, (t) = Taken By, (c) = Cosigned By    Initials Name Provider Type    Marilyn Granda PTA Physical Therapist Assistant                              PT OP Goals     Row Name 06/20/22 0900          PT Short Term Goals    STG Date to Achieve 06/14/22  -KM     STG 1 Demonstrate independence/compliance in performance of initial HEP  -KM     STG 1 Progress Met   "-KM     STG 2 Demonstrate improved seated PN/postural positioning with stabilization/strengthening activities with minimal cues required to correct  -KM     STG 2 Progress Met;Ongoing  -KM     STG 3 Improve TUG score to 16\" or less demonstrating improved functional mobility/dynamic balance  14 sec  -KM     STG 3 Progress Met  -KM     STG 4 Perform airex FT/EC static balance for 30\" with no LOB  -KM     STG 4 Progress Met  -KM            Long Term Goals    LTG Date to Achieve 07/04/22  -KM     LTG 1 Subjectively report 50% overall improvement or greater  -KM     LTG 1 Progress Met  -KM     LTG 2 Improve dynamic gait index score to 18/24 or greater demonstrating improved safety/performance with functional mobility  -KM     LTG 2 Progress Ongoing;Progressing  17/24  -KM     LTG 3 Demonstrate improved bilateral hip MMT to 4+/5 or greater in all planes  -KM     LTG 3 Progress Partially Met;Progressing  Met flex, progressing in abd  -KM     LTG 4 Ascend/descend trainng steps (4\"/6\") 1x5 with reciprocal pattern, good eccentric control, handrails prn  -KM     LTG 4 Progress Ongoing;Progressing  -KM     LTG 5 Perform balance course consisting of bench step, hurdles, unlevel/compliant surface, cones with no LOB & good overall LE/core control  -KM     LTG 5 Progress Ongoing;Progressing  -KM     LTG 6 Tolerate 20 minutes of standing stabilization/strengthening activities with </= 2 seated reast breaks to demo improved endurance/activity tolerance  -KM     LTG 6 Progress Ongoing;Progressing  -KM            Time Calculation    PT Goal Re-Cert Due Date 07/04/22  -KM           User Key  (r) = Recorded By, (t) = Taken By, (c) = Cosigned By    Initials Name Provider Type    Marilyn Granda, PTA Physical Therapist Assistant                Therapy Education  Given: HEP, Symptoms/condition management, Posture/body mechanics, Mobility training  Program: Reinforced  How Provided: Verbal, Demonstration, Written  Provided to: " Patient  Level of Understanding: Teach back education performed, Verbalized, Demonstrated              Time Calculation:   Start Time: 0902  Stop Time: 1000  Time Calculation (min): 58 min  Therapy Charges for Today     Code Description Service Date Service Provider Modifiers Qty    46369428499 HC PT THER PROC EA 15 MIN 6/20/2022 Marilyn Ghosh, CLYDE GP, CQ 3    20613267559 HC PT NEUROMUSC RE EDUCATION EA 15 MIN 6/20/2022 Marilyn Ghosh PTA GP, CQ 1                    Marilyn Ghosh PTA  6/20/2022

## 2022-06-20 NOTE — TELEPHONE ENCOUNTER
Rx Refill Note  Requested Prescriptions     Pending Prescriptions Disp Refills   • famotidine (PEPCID) 20 MG tablet [Pharmacy Med Name: Famotidine 20 MG Oral Tablet] 180 tablet 0     Sig: TAKE 2 TABLETS BY MOUTH AT BEDTIME      Last office visit with prescribing clinician: 5/16/2022      Next office visit with prescribing clinician: 11/21/2022            La Nena Ha MA  06/20/22, 07:49 CDT

## 2022-06-23 ENCOUNTER — HOSPITAL ENCOUNTER (OUTPATIENT)
Dept: PHYSICAL THERAPY | Facility: HOSPITAL | Age: 82
Setting detail: THERAPIES SERIES
Discharge: HOME | End: 2022-06-23

## 2022-06-23 DIAGNOSIS — R26.89 BALANCE PROBLEMS: Primary | ICD-10-CM

## 2022-06-23 PROCEDURE — 97112 NEUROMUSCULAR REEDUCATION: CPT

## 2022-06-23 PROCEDURE — 97110 THERAPEUTIC EXERCISES: CPT

## 2022-06-23 NOTE — THERAPY TREATMENT NOTE
Outpatient Physical Therapy Ortho Treatment Note  [facility]     Patient Name: Hailey Dickerson  : 1940  MRN: [id number]  Today's Date: 2022      Visit Date: 2022    Attendance: 10 visits  Subjective improvement: 70%  MD appt: PRN  Recheck due:     Visit Dx:    ICD-10-CM ICD-9-CM   1. Balance problems  R26.89 781.99       Patient Active Problem List   Diagnosis   • Essential hypertension   • Anxiety   • GERD (gastroesophageal reflux disease)   • Primary osteoarthritis of left knee   • Osteoarthritis of left knee   • Mixed hyperlipidemia   • Morbidly obese (HCC)        Past Medical History:   Diagnosis Date   • Anxiety    • Arthritis    • GERD (gastroesophageal reflux disease)    • Hypertension         Past Surgical History:   Procedure Laterality Date   • ANKLE SURGERY     • CHOLECYSTECTOMY     • DILATATION AND CURETTAGE     • TOTAL KNEE ARTHROPLASTY Left 2019    Procedure: TOTAL KNEE REPLACEMENT;  Surgeon: Rashaad Capps MD;  Location: [facility];  Service: Orthopedics        PT Ortho     Row Name 22       Subjective Comments    Subjective Comments pt states she has a HA this morning but doing okay other than that  -KM       Precautions and Contraindications    Precautions/Limitations no known precautions/limitations  -KM       Subjective Pain    Able to rate subjective pain? yes  -KM    Pre-Treatment Pain Level 0  -KM    Post-Treatment Pain Level 0  -KM       Posture/Observations    Posture/Observations Comments Slow nirmala; improving endurance  -KM          User Key  (r) = Recorded By, (t) = Taken By, (c) = Cosigned By    Initials Name Provider Type     Marilyn Olivarez PTA Physical Therapist Assistant                             PT Assessment/Plan     Row Name 22          PT Assessment    Assessment Comments pts endurance is improving nicely and does not require as many rest breaks. pt has no complaints of shortness of breath  throughout treatment. pt showing no issues with balance course.  -KM            PT Plan    PT Frequency 2x/week  -KM     PT Plan Comments Cybex: hip abd next  -KM           User Key  (r) = Recorded By, (t) = Taken By, (c) = Cosigned By    Initials Name Provider Type    Marilyn Granda PTA Physical Therapist Assistant                   OP Exercises     Row Name 06/23/22 0900             Subjective Comments    Subjective Comments pt states she has a HA this morning but doing okay other than that  -KM              Subjective Pain    Able to rate subjective pain? yes  -KM      Pre-Treatment Pain Level 0  -KM      Post-Treatment Pain Level 0  -KM              Exercise 1    Exercise Name 1 PROII for ROM/end  -KM      Time 1 8 min  -KM      Additional Comments L4.0  -KM              Exercise 2    Exercise Name 2 Incline gastroc S  -KM      Reps 2 2  -KM      Time 2 30 sec hold  -KM              Exercise 3    Exercise Name 3 Standing HS S  -KM      Reps 3 2  -KM      Time 3 30 sec hold  -KM              Exercise 4    Exercise Name 4 Airex CR/TR  -KM      Sets 4 2  -KM      Reps 4 10  -KM              Exercise 5    Exercise Name 5 Airex alt march  -KM      Sets 5 2  -KM      Reps 5 10  -KM              Exercise 6    Exercise Name 6 Fwd step up and over  -KM      Sets 6 1  -KM      Reps 6 15 ea LE  -KM      Additional Comments 6 inch  -KM              Exercise 7    Exercise Name 7 Lateral step up and over  -KM      Sets 7 1  -KM      Reps 7 15  -KM      Additional Comments 6 inch  -KM              Exercise 8    Exercise Name 8 Balance course: bench step, x2 hurdles, airex, 2 hurdles  -KM      Sets 8 1  -KM      Reps 8 3 laps  -KM              Exercise 9    Exercise Name 9 CC: resisted gait 4 way  -KM      Sets 9 1  -KM      Reps 9 4 laps  -KM      Additional Comments 1.5 pl  -KM              Exercise 10    Exercise Name 10 Cybex: LP2  -KM      Sets 10 2  -KM      Reps 10 10  -KM              Exercise 11    Exercise  "Name 11 Cybex: LP1  -KM      Sets 11 2  -KM      Reps 11 10 ea  -KM      Additional Comments bilateral; 50#  -KM            User Key  (r) = Recorded By, (t) = Taken By, (c) = Cosigned By    Initials Name Provider Type    Marilyn Granda, PTA Physical Therapist Assistant                              PT OP Goals     Row Name 06/23/22 0900          PT Short Term Goals    STG Date to Achieve 06/14/22  -KM     STG 1 Demonstrate independence/compliance in performance of initial HEP  -KM     STG 1 Progress Met  -KM     STG 2 Demonstrate improved seated PN/postural positioning with stabilization/strengthening activities with minimal cues required to correct  -KM     STG 2 Progress Met;Ongoing  -KM     STG 3 Improve TUG score to 16\" or less demonstrating improved functional mobility/dynamic balance  14 sec  -KM     STG 3 Progress Met  -KM     STG 4 Perform airex FT/EC static balance for 30\" with no LOB  -KM     STG 4 Progress Met  -KM            Long Term Goals    LTG Date to Achieve 07/04/22  -KM     LTG 1 Subjectively report 50% overall improvement or greater  -KM     LTG 1 Progress Met  -KM     LTG 2 Improve dynamic gait index score to 18/24 or greater demonstrating improved safety/performance with functional mobility  -KM     LTG 2 Progress Ongoing;Progressing  17/24  -KM     LTG 3 Demonstrate improved bilateral hip MMT to 4+/5 or greater in all planes  -KM     LTG 3 Progress Partially Met;Progressing  Met flex, progressing in abd  -KM     LTG 4 Ascend/descend trainng steps (4\"/6\") 1x5 with reciprocal pattern, good eccentric control, handrails prn  -KM     LTG 4 Progress Ongoing;Progressing  -KM     LTG 5 Perform balance course consisting of bench step, hurdles, unlevel/compliant surface, cones with no LOB & good overall LE/core control  -KM     LTG 5 Progress Ongoing;Progressing  -KM     LTG 6 Tolerate 20 minutes of standing stabilization/strengthening activities with </= 2 seated reast breaks to demo improved " endurance/activity tolerance  -KM     LTG 6 Progress Met  -KM            Time Calculation    PT Goal Re-Cert Due Date 07/04/22  -KM           User Key  (r) = Recorded By, (t) = Taken By, (c) = Cosigned By    Initials Name Provider Type    Marilyn Granda PTA Physical Therapist Assistant                Therapy Education  Given: HEP, Symptoms/condition management, Posture/body mechanics, Mobility training  Program: Reinforced  How Provided: Verbal, Demonstration, Written  Provided to: Patient  Level of Understanding: Teach back education performed, Verbalized, Demonstrated              Time Calculation:   Start Time: 0900  Stop Time: 1000  Time Calculation (min): 60 min  Therapy Charges for Today     Code Description Service Date Service Provider Modifiers Qty    84205783380 HC PT THER PROC EA 15 MIN 6/23/2022 Marilyn Olivarez PTA GP, CQ 2    81027502433 HC PT NEUROMUSC RE EDUCATION EA 15 MIN 6/23/2022 Marilyn Olivarez PTA GP, CQ 2                    Marilyn Olivarez PTA  6/23/2022

## 2022-06-24 ENCOUNTER — TELEPHONE (OUTPATIENT)
Dept: FAMILY MEDICINE CLINIC | Facility: CLINIC | Age: 82
End: 2022-06-24

## 2022-06-28 ENCOUNTER — HOSPITAL ENCOUNTER (OUTPATIENT)
Dept: PHYSICAL THERAPY | Facility: HOSPITAL | Age: 82
Setting detail: THERAPIES SERIES
Discharge: HOME | End: 2022-06-28

## 2022-06-28 DIAGNOSIS — R26.89 BALANCE PROBLEMS: Primary | ICD-10-CM

## 2022-06-28 PROCEDURE — 97110 THERAPEUTIC EXERCISES: CPT

## 2022-06-28 PROCEDURE — 97112 NEUROMUSCULAR REEDUCATION: CPT

## 2022-06-28 NOTE — THERAPY TREATMENT NOTE
Outpatient Physical Therapy Ortho Treatment Note  [facility]     Patient Name: Hailey Dickerson  : 1940  MRN: [id number]  Today's Date: 2022      Visit Date: 2022    Attendance: 11 visits  Subjective improvement: 70% improvement  MD appt: PRN  Recheck due:     Visit Dx:    ICD-10-CM ICD-9-CM   1. Balance problems  R26.89 781.99       Patient Active Problem List   Diagnosis   • Essential hypertension   • Anxiety   • GERD (gastroesophageal reflux disease)   • Primary osteoarthritis of left knee   • Osteoarthritis of left knee   • Mixed hyperlipidemia   • Morbidly obese (HCC)        Past Medical History:   Diagnosis Date   • Anxiety    • Arthritis    • GERD (gastroesophageal reflux disease)    • Hypertension         Past Surgical History:   Procedure Laterality Date   • ANKLE SURGERY     • CHOLECYSTECTOMY     • DILATATION AND CURETTAGE     • TOTAL KNEE ARTHROPLASTY Left 2019    Procedure: TOTAL KNEE REPLACEMENT;  Surgeon: Rashaad Capps MD;  Location: [facility] OR;  Service: Orthopedics        PT Ortho     Row Name 22 1000       Precautions and Contraindications    Precautions/Limitations no known precautions/limitations  -KM       Subjective Pain    Able to rate subjective pain? yes  -KM    Pre-Treatment Pain Level 0  -KM          User Key  (r) = Recorded By, (t) = Taken By, (c) = Cosigned By    Initials Name Provider Type    Marilyn Granda PTA Physical Therapist Assistant                             PT Assessment/Plan     Row Name 22 1000          PT Assessment    Assessment Comments Making good progress with PT and feels good to be done at the end of this week. pt did well with cybex and CC strengthening. Did well with BOSU fwd and lateral step ups with single rail assist  -KM            PT Plan    PT Frequency 2x/week  -KM     PT Plan Comments Plan to D/C next visit. Finalize HEP  -KM           User Key  (r) = Recorded By, (t) = Taken By, (c) =  Cosigned By    Initials Name Provider Type    MIRACLE Olivarez Marilyn G, PTA Physical Therapist Assistant                   OP Exercises     Row Name 06/28/22 1000             Subjective Comments    Subjective Comments pt states that she gets most aggravated when she is out at a restraunt and has difficulty getting out of the chair or [place]. Also frustrating getting out of the car sometimes. Has gotten better but still challenged at times.  -KM              Subjective Pain    Able to rate subjective pain? yes  -KM      Pre-Treatment Pain Level 0  -KM      Post-Treatment Pain Level 0  -KM              Exercise 1    Exercise Name 1 PROII for ROM/end  -KM      Time 1 8 min  -KM      Additional Comments L4.0  -KM              Exercise 2    Exercise Name 2 Incline gastroc S  -KM      Reps 2 2  -KM      Time 2 30 sec hold  -KM              Exercise 3    Exercise Name 3 Standing HS S  -KM      Reps 3 2  -KM      Time 3 30 sec hold  -KM              Exercise 4    Exercise Name 4 BOSU fwd step ups  -KM      Sets 4 1  -KM      Reps 4 10  -KM      Additional Comments bilateral  -KM              Exercise 5    Exercise Name 5 BOSU lateral step up and over  -KM      Sets 5 1  -KM      Reps 5 10  -KM              Exercise 6    Exercise Name 6 Cybex: LP2  -KM      Sets 6 2  -KM      Reps 6 10  -KM      Additional Comments 70#  -KM              Exercise 7    Exercise Name 7 Cybex: LP1  -KM      Sets 7 2  -KM      Reps 7 10  -KM      Additional Comments 50#; bilateral  -KM              Exercise 8    Exercise Name 8 Balance course: 6 inch bench step, hurdles, uneven mat, cones  -KM      Sets 8 1  -KM      Reps 8 3 laps  -KM              Exercise 9    Exercise Name 9 CC: resisted gait 4 way  -KM      Sets 9 1  -KM      Reps 9 4 laps  -KM      Additional Comments 1.5 pl  -KM              Exercise 10    Exercise Name 10 Cybex: Hip abd  -KM      Sets 10 2  -KM      Reps 10 10  -KM      Additional Comments 30#  -KM            User Key  (r)  "= Recorded By, (t) = Taken By, (c) = Cosigned By    Initials Name Provider Type    Marilyn Granda PTA Physical Therapist Assistant                              PT OP Goals     Row Name 06/28/22 1000          PT Short Term Goals    STG Date to Achieve 06/14/22  -KM     STG 1 Demonstrate independence/compliance in performance of initial HEP  -KM     STG 1 Progress Met  -KM     STG 2 Demonstrate improved seated PN/postural positioning with stabilization/strengthening activities with minimal cues required to correct  -KM     STG 2 Progress Met  -KM     STG 3 Improve TUG score to 16\" or less demonstrating improved functional mobility/dynamic balance  14 sec  -KM     STG 3 Progress Met  -KM     STG 4 Perform airex FT/EC static balance for 30\" with no LOB  -KM     STG 4 Progress Met  -KM            Long Term Goals    LTG Date to Achieve 07/04/22  -KM     LTG 1 Subjectively report 50% overall improvement or greater  -KM     LTG 1 Progress Met  -KM     LTG 2 Improve dynamic gait index score to 18/24 or greater demonstrating improved safety/performance with functional mobility  -KM     LTG 2 Progress Ongoing;Progressing  17/24  -KM     LTG 3 Demonstrate improved bilateral hip MMT to 4+/5 or greater in all planes  -KM     LTG 3 Progress Met  -KM     LTG 4 Ascend/descend trainng steps (4\"/6\") 1x5 with reciprocal pattern, good eccentric control, handrails prn  -KM     LTG 4 Progress Met  -KM     LTG 5 Perform balance course consisting of bench step, hurdles, unlevel/compliant surface, cones with no LOB & good overall LE/core control  -KM     LTG 5 Progress Met  -KM     LTG 6 Tolerate 20 minutes of standing stabilization/strengthening activities with </= 2 seated reast breaks to demo improved endurance/activity tolerance  -KM     LTG 6 Progress Met  -KM            Time Calculation    PT Goal Re-Cert Due Date 07/04/22  -KM           User Key  (r) = Recorded By, (t) = Taken By, (c) = Cosigned By    Initials Name " Provider Type    KM Marilyn Ghosh PTA Physical Therapist Assistant                Therapy Education  Given: HEP, Symptoms/condition management, Posture/body mechanics, Mobility training  Program: Reinforced  How Provided: Verbal, Demonstration, Written  Provided to: Patient  Level of Understanding: Teach back education performed, Verbalized, Demonstrated              Time Calculation:   Start Time: 0904  Stop Time: 1000  Time Calculation (min): 56 min  Therapy Charges for Today     Code Description Service Date Service Provider Modifiers Qty    29984122248  PT THER PROC EA 15 MIN 6/28/2022 Marilyn Ghosh PTA GP, CQ 2    [id number]  PT NEUROMUSC RE EDUCATION EA 15 MIN 6/28/2022 Marilyn Ghosh PTA GP, CQ 2                    Marilyn Ghosh PTA  6/28/2022

## 2022-06-30 ENCOUNTER — HOSPITAL ENCOUNTER (OUTPATIENT)
Dept: PHYSICAL THERAPY | Facility: HOSPITAL | Age: 82
Setting detail: THERAPIES SERIES
Discharge: HOME | End: 2022-06-30

## 2022-06-30 DIAGNOSIS — R26.89 BALANCE PROBLEMS: Primary | ICD-10-CM

## 2022-06-30 PROCEDURE — 97110 THERAPEUTIC EXERCISES: CPT

## 2022-06-30 PROCEDURE — 97112 NEUROMUSCULAR REEDUCATION: CPT

## 2022-08-19 RX ORDER — ALENDRONATE SODIUM 70 MG/1
70 TABLET ORAL
Qty: 12 TABLET | Refills: 3 | Status: SHIPPED | OUTPATIENT
Start: 2022-08-19

## 2022-08-19 NOTE — TELEPHONE ENCOUNTER
Fax refill request - org dr lynn per pharmacy note    Rx Refill Note  Requested Prescriptions     Pending Prescriptions Disp Refills   • alendronate (FOSAMAX) 70 MG tablet 12 tablet 3     Sig: Take 1 tablet by mouth Every 7 (Seven) Days.      Last office visit with prescribing clinician: 5/16/2022      Next office visit with prescribing clinician: 11/21/2022            La Nena Ha MA  08/19/22, 16:22 CDT

## 2022-09-08 ENCOUNTER — OFFICE VISIT (OUTPATIENT)
Dept: FAMILY MEDICINE CLINIC | Facility: CLINIC | Age: 82
End: 2022-09-08

## 2022-09-08 VITALS
OXYGEN SATURATION: 97 % | BODY MASS INDEX: 36.32 KG/M2 | DIASTOLIC BLOOD PRESSURE: 78 MMHG | TEMPERATURE: 98.4 F | WEIGHT: 205 LBS | SYSTOLIC BLOOD PRESSURE: 132 MMHG | HEIGHT: 63 IN | RESPIRATION RATE: 18 BRPM | HEART RATE: 89 BPM

## 2022-09-08 DIAGNOSIS — Z20.822 CLOSE EXPOSURE TO COVID-19 VIRUS: Primary | ICD-10-CM

## 2022-09-08 DIAGNOSIS — B34.9 VIRAL INFECTION: ICD-10-CM

## 2022-09-08 LAB
EXPIRATION DATE: ABNORMAL
FLUAV AG UPPER RESP QL IA.RAPID: NOT DETECTED
FLUBV AG UPPER RESP QL IA.RAPID: NOT DETECTED
INTERNAL CONTROL: ABNORMAL
Lab: ABNORMAL
SARS-COV-2 AG UPPER RESP QL IA.RAPID: DETECTED

## 2022-09-08 PROCEDURE — 99213 OFFICE O/P EST LOW 20 MIN: CPT | Performed by: FAMILY MEDICINE

## 2022-09-08 PROCEDURE — 87428 SARSCOV & INF VIR A&B AG IA: CPT | Performed by: FAMILY MEDICINE

## 2022-09-08 NOTE — PROGRESS NOTES
Subjective   Hailey Dickerson is a 82 y.o. female.     82-year-old female with positive COVID and  with COVID      The following portions of the patient's history were reviewed and updated as appropriate: allergies, current medications, past family history, past medical history, past social history, past surgical history and problem list.    Review of Systems   HENT: Positive for postnasal drip and sinus pressure.    Respiratory: Positive for cough. Negative for shortness of breath.    Cardiovascular: Negative for chest pain and leg swelling.   Neurological: Positive for headaches.       Objective   Physical Exam  Vitals and nursing note reviewed.   Constitutional:       Appearance: She is obese.   HENT:      Mouth/Throat:      Mouth: Mucous membranes are moist.      Pharynx: Oropharynx is clear.   Eyes:      Extraocular Movements: Extraocular movements intact.      Pupils: Pupils are equal, round, and reactive to light.   Cardiovascular:      Rate and Rhythm: Normal rate and regular rhythm.   Pulmonary:      Effort: Pulmonary effort is normal.      Breath sounds: Normal breath sounds.   Musculoskeletal:      Right lower leg: No edema.      Left lower leg: No edema.   Skin:     General: Skin is warm.   Neurological:      General: No focal deficit present.      Mental Status: She is alert and oriented to person, place, and time.   Psychiatric:         Mood and Affect: Mood normal.         Behavior: Behavior normal.         Thought Content: Thought content normal.         Judgment: Judgment normal.         Assessment & Plan   Diagnoses and all orders for this visit:    1. Close exposure to COVID-19 virus (Primary)  -     POCT SARS-CoV-2 Antigen MARIAN    2. Viral infection    Other orders  -     Nirmatrelvir&Ritonavir 300/100 (PAXLOVID) 20 x 150 MG & 10 x 100MG tablet therapy pack tablet; Take 3 tablets by mouth 2 (Two) Times a Day for 5 days.  Dispense: 30 tablet; Refill: 0         Plan above-quarantine- stay off  Zocor x5 days

## 2022-09-22 ENCOUNTER — OFFICE VISIT (OUTPATIENT)
Dept: FAMILY MEDICINE CLINIC | Facility: CLINIC | Age: 82
End: 2022-09-22

## 2022-09-22 VITALS
HEART RATE: 95 BPM | SYSTOLIC BLOOD PRESSURE: 122 MMHG | HEIGHT: 63 IN | DIASTOLIC BLOOD PRESSURE: 78 MMHG | WEIGHT: 207.2 LBS | TEMPERATURE: 97.5 F | BODY MASS INDEX: 36.71 KG/M2 | RESPIRATION RATE: 18 BRPM | OXYGEN SATURATION: 97 %

## 2022-09-22 DIAGNOSIS — R10.13 EPIGASTRIC PAIN: Primary | ICD-10-CM

## 2022-09-22 PROCEDURE — 99213 OFFICE O/P EST LOW 20 MIN: CPT | Performed by: FAMILY MEDICINE

## 2022-09-22 NOTE — PROGRESS NOTES
Markie Dickerson is a 82 y.o. female.     History of Present Illness  82-year-old female with abdominal pain epigastric and lower episodic not progressive      The following portions of the patient's history were reviewed and updated as appropriate: allergies, current medications, past family history, past medical history, past social history, past surgical history and problem list.    Review of Systems   Respiratory: Negative for shortness of breath.    Cardiovascular: Negative for chest pain and leg swelling.   Gastrointestinal: Positive for abdominal pain. Negative for blood in stool, constipation and vomiting.        Cologuard negative a few months ago       Objective   Physical Exam  Vitals and nursing note reviewed.   Constitutional:       Appearance: She is obese.   Cardiovascular:      Rate and Rhythm: Normal rate and regular rhythm.   Pulmonary:      Effort: Pulmonary effort is normal.      Breath sounds: Normal breath sounds.   Abdominal:      General: Abdomen is flat.      Palpations: Abdomen is soft. There is no mass.   Musculoskeletal:      Right lower leg: No edema.      Left lower leg: No edema.   Skin:     General: Skin is warm and dry.   Neurological:      General: No focal deficit present.      Mental Status: She is alert and oriented to person, place, and time.   Psychiatric:         Mood and Affect: Mood normal.         Behavior: Behavior normal.         Thought Content: Thought content normal.         Judgment: Judgment normal.         Assessment & Plan   Diagnoses and all orders for this visit:    1. Epigastric pain (Primary)  -     CBC & Differential  -     Comprehensive Metabolic Panel  -     Amylase  -     Lipase       Plan-diverticulosis information-advised flu and COVID vaccines

## 2022-09-23 LAB
ALBUMIN SERPL-MCNC: 4.7 G/DL (ref 3.5–5.2)
ALBUMIN/GLOB SERPL: 2.2 G/DL
ALP SERPL-CCNC: 57 U/L (ref 39–117)
ALT SERPL-CCNC: 11 U/L (ref 1–33)
AMYLASE SERPL-CCNC: 78 U/L (ref 28–100)
AST SERPL-CCNC: 18 U/L (ref 1–32)
BASOPHILS # BLD AUTO: 0.08 10*3/MM3 (ref 0–0.2)
BASOPHILS NFR BLD AUTO: 1.2 % (ref 0–1.5)
BILIRUB SERPL-MCNC: 0.3 MG/DL (ref 0–1.2)
BUN SERPL-MCNC: 18 MG/DL (ref 8–23)
BUN/CREAT SERPL: 17.3 (ref 7–25)
CALCIUM SERPL-MCNC: 9.5 MG/DL (ref 8.6–10.5)
CHLORIDE SERPL-SCNC: 102 MMOL/L (ref 98–107)
CO2 SERPL-SCNC: 25 MMOL/L (ref 22–29)
CREAT SERPL-MCNC: 1.04 MG/DL (ref 0.57–1)
EGFRCR SERPLBLD CKD-EPI 2021: 53.4 ML/MIN/1.73
EOSINOPHIL # BLD AUTO: 0.11 10*3/MM3 (ref 0–0.4)
EOSINOPHIL NFR BLD AUTO: 1.7 % (ref 0.3–6.2)
ERYTHROCYTE [DISTWIDTH] IN BLOOD BY AUTOMATED COUNT: 11.9 % (ref 12.3–15.4)
GLOBULIN SER CALC-MCNC: 2.1 GM/DL
GLUCOSE SERPL-MCNC: 100 MG/DL (ref 65–99)
HCT VFR BLD AUTO: 41.6 % (ref 34–46.6)
HGB BLD-MCNC: 13.9 G/DL (ref 12–15.9)
IMM GRANULOCYTES # BLD AUTO: 0.02 10*3/MM3 (ref 0–0.05)
IMM GRANULOCYTES NFR BLD AUTO: 0.3 % (ref 0–0.5)
LIPASE SERPL-CCNC: 33 U/L (ref 13–60)
LYMPHOCYTES # BLD AUTO: 1.95 10*3/MM3 (ref 0.7–3.1)
LYMPHOCYTES NFR BLD AUTO: 29.8 % (ref 19.6–45.3)
MCH RBC QN AUTO: 32.9 PG (ref 26.6–33)
MCHC RBC AUTO-ENTMCNC: 33.4 G/DL (ref 31.5–35.7)
MCV RBC AUTO: 98.6 FL (ref 79–97)
MONOCYTES # BLD AUTO: 0.6 10*3/MM3 (ref 0.1–0.9)
MONOCYTES NFR BLD AUTO: 9.2 % (ref 5–12)
NEUTROPHILS # BLD AUTO: 3.79 10*3/MM3 (ref 1.7–7)
NEUTROPHILS NFR BLD AUTO: 57.8 % (ref 42.7–76)
NRBC BLD AUTO-RTO: 0 /100 WBC (ref 0–0.2)
PLATELET # BLD AUTO: 296 10*3/MM3 (ref 140–450)
POTASSIUM SERPL-SCNC: 4.5 MMOL/L (ref 3.5–5.2)
PROT SERPL-MCNC: 6.8 G/DL (ref 6–8.5)
RBC # BLD AUTO: 4.22 10*6/MM3 (ref 3.77–5.28)
SODIUM SERPL-SCNC: 139 MMOL/L (ref 136–145)
WBC # BLD AUTO: 6.55 10*3/MM3 (ref 3.4–10.8)

## 2022-10-31 RX ORDER — CITALOPRAM 20 MG/1
TABLET ORAL
Qty: 90 TABLET | Refills: 3 | Status: SHIPPED | OUTPATIENT
Start: 2022-10-31

## 2022-11-21 ENCOUNTER — OFFICE VISIT (OUTPATIENT)
Dept: FAMILY MEDICINE CLINIC | Facility: CLINIC | Age: 82
End: 2022-11-21

## 2022-11-21 VITALS
BODY MASS INDEX: 37.25 KG/M2 | HEART RATE: 76 BPM | DIASTOLIC BLOOD PRESSURE: 70 MMHG | SYSTOLIC BLOOD PRESSURE: 120 MMHG | WEIGHT: 210.2 LBS | OXYGEN SATURATION: 97 % | RESPIRATION RATE: 16 BRPM | HEIGHT: 63 IN | TEMPERATURE: 97.3 F

## 2022-11-21 DIAGNOSIS — R73.9 ELEVATED BLOOD SUGAR: ICD-10-CM

## 2022-11-21 DIAGNOSIS — E78.2 MIXED HYPERLIPIDEMIA: Primary | ICD-10-CM

## 2022-11-21 PROCEDURE — 99213 OFFICE O/P EST LOW 20 MIN: CPT | Performed by: FAMILY MEDICINE

## 2022-11-21 NOTE — PROGRESS NOTES
Markie Dickerson is a 82 y.o. female.     History of Present Illness  82-year-old female with hyperlipidemia hypertension morbid obesity      The following portions of the patient's history were reviewed and updated as appropriate: allergies, current medications, past family history, past medical history, past social history, past surgical history and problem list.    Review of Systems   Respiratory: Negative for shortness of breath.    Cardiovascular: Negative for chest pain and leg swelling.   Musculoskeletal: Positive for gait problem.        Week-went to physical therapy-continue doing exercises       Objective   Physical Exam  Vitals and nursing note reviewed.   Constitutional:       Appearance: She is obese.   Cardiovascular:      Rate and Rhythm: Normal rate and regular rhythm.   Pulmonary:      Effort: Pulmonary effort is normal.      Breath sounds: Normal breath sounds.   Abdominal:      Palpations: Abdomen is soft. There is no mass.   Musculoskeletal:      Right lower leg: No edema.      Left lower leg: No edema.   Neurological:      General: No focal deficit present.      Mental Status: She is alert and oriented to person, place, and time.   Psychiatric:         Mood and Affect: Mood normal.         Behavior: Behavior normal.         Thought Content: Thought content normal.         Judgment: Judgment normal.         Assessment & Plan   Diagnoses and all orders for this visit:    1. Mixed hyperlipidemia (Primary)  -     Comprehensive Metabolic Panel  -     Lipid Panel    2. Elevated blood sugar  -     Hemoglobin A1c    Resume diuretic to every day-get new COVID booster

## 2022-11-22 ENCOUNTER — IMMUNIZATION (OUTPATIENT)
Dept: FAMILY MEDICINE CLINIC | Facility: CLINIC | Age: 82
End: 2022-11-22

## 2022-11-22 DIAGNOSIS — Z23 NEED FOR VACCINATION: Primary | ICD-10-CM

## 2022-11-22 LAB
ALBUMIN SERPL-MCNC: 4.3 G/DL (ref 3.5–5.2)
ALBUMIN/GLOB SERPL: 1.7 G/DL
ALP SERPL-CCNC: 57 U/L (ref 39–117)
ALT SERPL-CCNC: <5 U/L (ref 1–33)
AST SERPL-CCNC: 17 U/L (ref 1–32)
BILIRUB SERPL-MCNC: 0.3 MG/DL (ref 0–1.2)
BUN SERPL-MCNC: 21 MG/DL (ref 8–23)
BUN/CREAT SERPL: 19.8 (ref 7–25)
CALCIUM SERPL-MCNC: 9.5 MG/DL (ref 8.6–10.5)
CHLORIDE SERPL-SCNC: 102 MMOL/L (ref 98–107)
CHOLEST SERPL-MCNC: 178 MG/DL (ref 0–200)
CO2 SERPL-SCNC: 27.1 MMOL/L (ref 22–29)
CREAT SERPL-MCNC: 1.06 MG/DL (ref 0.57–1)
EGFRCR SERPLBLD CKD-EPI 2021: 52.2 ML/MIN/1.73
GLOBULIN SER CALC-MCNC: 2.6 GM/DL
GLUCOSE SERPL-MCNC: 102 MG/DL (ref 65–99)
HBA1C MFR BLD: 5.4 % (ref 4.8–5.6)
HDLC SERPL-MCNC: 49 MG/DL (ref 40–60)
LDLC SERPL CALC-MCNC: 96 MG/DL (ref 0–100)
POTASSIUM SERPL-SCNC: 4.5 MMOL/L (ref 3.5–5.2)
PROT SERPL-MCNC: 6.9 G/DL (ref 6–8.5)
SODIUM SERPL-SCNC: 140 MMOL/L (ref 136–145)
TRIGL SERPL-MCNC: 196 MG/DL (ref 0–150)
VLDLC SERPL CALC-MCNC: 33 MG/DL (ref 5–40)

## 2022-11-22 PROCEDURE — 91312 COVID-19 (PFIZER) BIVALENT BOOSTER 12+YRS: CPT | Performed by: FAMILY MEDICINE

## 2022-11-22 PROCEDURE — 0124A COVID-19 (PFIZER) BIVALENT BOOSTER 12+YRS: CPT | Performed by: FAMILY MEDICINE

## 2022-11-28 ENCOUNTER — OFFICE VISIT (OUTPATIENT)
Dept: FAMILY MEDICINE CLINIC | Facility: CLINIC | Age: 82
End: 2022-11-28

## 2022-11-28 VITALS
HEIGHT: 63 IN | WEIGHT: 209.8 LBS | BODY MASS INDEX: 37.17 KG/M2 | DIASTOLIC BLOOD PRESSURE: 79 MMHG | SYSTOLIC BLOOD PRESSURE: 130 MMHG | OXYGEN SATURATION: 98 % | TEMPERATURE: 97.3 F | HEART RATE: 68 BPM

## 2022-11-28 DIAGNOSIS — M25.511 ACUTE PAIN OF RIGHT SHOULDER: Primary | ICD-10-CM

## 2022-11-28 PROCEDURE — 99212 OFFICE O/P EST SF 10 MIN: CPT | Performed by: FAMILY MEDICINE

## 2022-11-28 PROCEDURE — 96372 THER/PROPH/DIAG INJ SC/IM: CPT | Performed by: FAMILY MEDICINE

## 2022-11-28 RX ORDER — TRIAMCINOLONE ACETONIDE 40 MG/ML
40 INJECTION, SUSPENSION INTRA-ARTICULAR; INTRAMUSCULAR ONCE
Status: COMPLETED | OUTPATIENT
Start: 2022-11-28 | End: 2022-11-28

## 2022-11-28 RX ADMIN — TRIAMCINOLONE ACETONIDE 40 MG: 40 INJECTION, SUSPENSION INTRA-ARTICULAR; INTRAMUSCULAR at 14:40

## 2022-11-28 NOTE — PROGRESS NOTES
Markie Dickerson is a 82 y.o. female.     History of Present Illness  82-year-old female with right shoulder pain      The following portions of the patient's history were reviewed and updated as appropriate: allergies, current medications, past family history, past medical history, past social history, past surgical history and problem list.    Review of Systems   Respiratory: Negative for shortness of breath.    Cardiovascular: Negative for chest pain and leg swelling.   Musculoskeletal: Positive for arthralgias and neck pain.       Objective   Physical Exam  Vitals and nursing note reviewed.   Constitutional:       Appearance: She is obese.   Cardiovascular:      Rate and Rhythm: Normal rate and regular rhythm.   Pulmonary:      Effort: Pulmonary effort is normal.      Breath sounds: Normal breath sounds.   Musculoskeletal:      Right lower leg: No edema.      Left lower leg: No edema.      Comments: Shoulder range of motion normal   Skin:     General: Skin is warm and dry.   Neurological:      General: No focal deficit present.      Mental Status: She is alert and oriented to person, place, and time.   Psychiatric:         Mood and Affect: Mood normal.         Behavior: Behavior normal.         Thought Content: Thought content normal.         Judgment: Judgment normal.         Assessment & Plan   Diagnoses and all orders for this visit:    1. Acute pain of right shoulder (Primary)  -     XR Shoulder 2+ View Right; Future  -     triamcinolone acetonide (KENALOG-40) injection 40 mg         Plan above-range of motion exercises heat

## 2022-11-29 RX ORDER — PREDNISONE 20 MG/1
20 TABLET ORAL DAILY
Qty: 10 TABLET | Refills: 0 | Status: SHIPPED | OUTPATIENT
Start: 2022-11-29 | End: 2022-12-09

## 2023-01-12 NOTE — TELEPHONE ENCOUNTER
MED REFILL FOR RANITIDINE 300MG-MEDICATION NOT AVAILABLE.       PER MD CHANGE TO PEPCID 40MG (1) Q    [facility]        SPOKE WITH PT & ADVISED MED CHANGE-VERBALIZED UNDERSTANDING.   
Pt's RN Nicole aware of intent to eval/tx; cleared Pt. PT Lucia RASHID. present. Pt received in supine - +telemetry, STEPHANIE adler-line. Pt agreeable to OT.

## 2023-02-06 RX ORDER — INDAPAMIDE 2.5 MG/1
2.5 TABLET, FILM COATED ORAL DAILY
Qty: 90 TABLET | Refills: 1 | Status: SHIPPED | OUTPATIENT
Start: 2023-02-06

## 2023-02-06 NOTE — TELEPHONE ENCOUNTER
Rx Refill Note  Requested Prescriptions     Pending Prescriptions Disp Refills   • indapamide (LOZOL) 2.5 MG tablet [Pharmacy Med Name: Indapamide 2.5 MG Oral Tablet] 36 tablet 0     Sig: TAKE 1 TABLET BY MOUTH ON MONDAY, WEDNESDAY AND FRIDAY      Last office visit with prescribing clinician: 11/28/2022   Last telemedicine visit with prescribing clinician: 5/18/2023   Next office visit with prescribing clinician: 5/18/2023       {TIP  Please add Last Relevant Lab 11/21/22                 Would you like a call back once the refill request has been completed: [] Yes [] No    If the office needs to give you a call back, can they leave a voicemail: [] Yes [] No    La Nena Ha MA  02/06/23, 08:19 CST

## 2023-03-21 ENCOUNTER — OFFICE VISIT (OUTPATIENT)
Dept: FAMILY MEDICINE CLINIC | Facility: CLINIC | Age: 83
End: 2023-03-21
Payer: MEDICARE

## 2023-03-21 VITALS
HEART RATE: 93 BPM | RESPIRATION RATE: 18 BRPM | WEIGHT: 212.8 LBS | DIASTOLIC BLOOD PRESSURE: 78 MMHG | BODY MASS INDEX: 37.7 KG/M2 | TEMPERATURE: 98.4 F | HEIGHT: 63 IN | OXYGEN SATURATION: 93 % | SYSTOLIC BLOOD PRESSURE: 128 MMHG

## 2023-03-21 DIAGNOSIS — R53.83 FATIGUE, UNSPECIFIED TYPE: ICD-10-CM

## 2023-03-21 DIAGNOSIS — G44.209 TENSION-TYPE HEADACHE, NOT INTRACTABLE, UNSPECIFIED CHRONICITY PATTERN: ICD-10-CM

## 2023-03-21 DIAGNOSIS — R73.9 ELEVATED BLOOD SUGAR: Primary | ICD-10-CM

## 2023-03-21 RX ORDER — MULTIVIT-MIN/IRON/FOLIC ACID/K 18-600-40
CAPSULE ORAL DAILY
COMMUNITY

## 2023-03-21 RX ORDER — TRIAMCINOLONE ACETONIDE 40 MG/ML
40 INJECTION, SUSPENSION INTRA-ARTICULAR; INTRAMUSCULAR ONCE
Status: COMPLETED | OUTPATIENT
Start: 2023-03-21 | End: 2023-03-21

## 2023-03-21 RX ORDER — BUTALBITAL, ACETAMINOPHEN AND CAFFEINE 50; 325; 40 MG/1; MG/1; MG/1
1 TABLET ORAL EVERY 4 HOURS PRN
Qty: 10 TABLET | Refills: 0 | Status: SHIPPED | OUTPATIENT
Start: 2023-03-21

## 2023-03-21 RX ADMIN — TRIAMCINOLONE ACETONIDE 40 MG: 40 INJECTION, SUSPENSION INTRA-ARTICULAR; INTRAMUSCULAR at 12:53

## 2023-03-21 NOTE — PROGRESS NOTES
Markie Dickerson is a 82 y.o. female.     History of Present Illness  82-year-old female complaining of headache across upper eyes starting in her neck after above bilevel housework  Headache      The following portions of the patient's history were reviewed and updated as appropriate: allergies, current medications, past family history, past medical history, past social history, past surgical history and problem list.    Review of Systems   Eyes: Negative for visual disturbance.   Respiratory: Negative for shortness of breath.    Cardiovascular: Positive for leg swelling. Negative for chest pain.        Advised to get support hose   Musculoskeletal: Positive for arthralgias and neck pain.   Neurological: Positive for headaches.       Objective   Physical Exam  Vitals and nursing note reviewed.   Constitutional:       Appearance: She is obese.   HENT:      Head:      Comments: No sinus tenderness to percussion     Right Ear: Tympanic membrane and ear canal normal.      Left Ear: Tympanic membrane and ear canal normal.   Eyes:      Extraocular Movements: Extraocular movements intact.      Pupils: Pupils are equal, round, and reactive to light.   Neck:      Vascular: No carotid bruit.   Cardiovascular:      Rate and Rhythm: Normal rate and regular rhythm.   Pulmonary:      Effort: Pulmonary effort is normal.      Breath sounds: Normal breath sounds.   Musculoskeletal:      Right lower leg: Edema present.      Left lower leg: Edema present.   Lymphadenopathy:      Cervical: No cervical adenopathy.   Skin:     General: Skin is warm and dry.   Neurological:      General: No focal deficit present.      Mental Status: She is alert and oriented to person, place, and time.   Psychiatric:         Mood and Affect: Mood normal.         Behavior: Behavior normal.         Thought Content: Thought content normal.         Judgment: Judgment normal.         Assessment & Plan   Diagnoses and all orders for this  visit:    1. Elevated blood sugar (Primary)  -     Basic Metabolic Panel    2. Fatigue, unspecified type  -     CBC & Differential    3. Tension-type headache, not intractable, unspecified chronicity pattern  -     Sedimentation Rate  -     triamcinolone acetonide (KENALOG-40) injection 40 mg    Other orders  -     butalbital-acetaminophen-caffeine (Esgic) -40 MG per tablet; Take 1 tablet by mouth Every 4 (Four) Hours As Needed for Headache.  Dispense: 10 tablet; Refill: 0       Plan above- above eye Level  neck education -if did not improve will recommend CT of sinuses and head

## 2023-03-22 LAB
BASOPHILS # BLD AUTO: 0.07 10*3/MM3 (ref 0–0.2)
BASOPHILS NFR BLD AUTO: 0.9 % (ref 0–1.5)
BUN SERPL-MCNC: 24 MG/DL (ref 8–23)
BUN/CREAT SERPL: 22.2 (ref 7–25)
CALCIUM SERPL-MCNC: 9.6 MG/DL (ref 8.6–10.5)
CHLORIDE SERPL-SCNC: 99 MMOL/L (ref 98–107)
CO2 SERPL-SCNC: 26 MMOL/L (ref 22–29)
CREAT SERPL-MCNC: 1.08 MG/DL (ref 0.57–1)
EGFRCR SERPLBLD CKD-EPI 2021: 51.1 ML/MIN/1.73
EOSINOPHIL # BLD AUTO: 0.13 10*3/MM3 (ref 0–0.4)
EOSINOPHIL NFR BLD AUTO: 1.7 % (ref 0.3–6.2)
ERYTHROCYTE [DISTWIDTH] IN BLOOD BY AUTOMATED COUNT: 11.9 % (ref 12.3–15.4)
ERYTHROCYTE [SEDIMENTATION RATE] IN BLOOD BY WESTERGREN METHOD: 21 MM/HR (ref 0–30)
GLUCOSE SERPL-MCNC: 84 MG/DL (ref 65–99)
HCT VFR BLD AUTO: 40.4 % (ref 34–46.6)
HGB BLD-MCNC: 14 G/DL (ref 12–15.9)
IMM GRANULOCYTES # BLD AUTO: 0.02 10*3/MM3 (ref 0–0.05)
IMM GRANULOCYTES NFR BLD AUTO: 0.3 % (ref 0–0.5)
LYMPHOCYTES # BLD AUTO: 2.73 10*3/MM3 (ref 0.7–3.1)
LYMPHOCYTES NFR BLD AUTO: 35.6 % (ref 19.6–45.3)
MCH RBC QN AUTO: 33.2 PG (ref 26.6–33)
MCHC RBC AUTO-ENTMCNC: 34.7 G/DL (ref 31.5–35.7)
MCV RBC AUTO: 95.7 FL (ref 79–97)
MONOCYTES # BLD AUTO: 0.85 10*3/MM3 (ref 0.1–0.9)
MONOCYTES NFR BLD AUTO: 11.1 % (ref 5–12)
NEUTROPHILS # BLD AUTO: 3.87 10*3/MM3 (ref 1.7–7)
NEUTROPHILS NFR BLD AUTO: 50.4 % (ref 42.7–76)
NRBC BLD AUTO-RTO: 0 /100 WBC (ref 0–0.2)
PLATELET # BLD AUTO: 302 10*3/MM3 (ref 140–450)
POTASSIUM SERPL-SCNC: 4.3 MMOL/L (ref 3.5–5.2)
RBC # BLD AUTO: 4.22 10*6/MM3 (ref 3.77–5.28)
SODIUM SERPL-SCNC: 137 MMOL/L (ref 136–145)
WBC # BLD AUTO: 7.67 10*3/MM3 (ref 3.4–10.8)

## 2023-05-10 ENCOUNTER — PRIOR AUTHORIZATION (OUTPATIENT)
Dept: FAMILY MEDICINE CLINIC | Facility: CLINIC | Age: 83
End: 2023-05-10
Payer: MEDICARE

## 2023-05-10 ENCOUNTER — OFFICE VISIT (OUTPATIENT)
Dept: FAMILY MEDICINE CLINIC | Facility: CLINIC | Age: 83
End: 2023-05-10
Payer: MEDICARE

## 2023-05-10 VITALS
HEIGHT: 63 IN | RESPIRATION RATE: 18 BRPM | HEART RATE: 108 BPM | WEIGHT: 212.8 LBS | OXYGEN SATURATION: 97 % | TEMPERATURE: 98 F | BODY MASS INDEX: 37.7 KG/M2 | DIASTOLIC BLOOD PRESSURE: 70 MMHG | SYSTOLIC BLOOD PRESSURE: 112 MMHG

## 2023-05-10 DIAGNOSIS — R30.0 DYSURIA: Primary | ICD-10-CM

## 2023-05-10 DIAGNOSIS — G44.209 ACUTE NON INTRACTABLE TENSION-TYPE HEADACHE: ICD-10-CM

## 2023-05-10 DIAGNOSIS — R31.29 MICROSCOPIC HEMATURIA: ICD-10-CM

## 2023-05-10 LAB
BILIRUB BLD-MCNC: NEGATIVE MG/DL
CLARITY, POC: CLEAR
COLOR UR: YELLOW
GLUCOSE UR STRIP-MCNC: NEGATIVE MG/DL
KETONES UR QL: NEGATIVE
LEUKOCYTE EST, POC: ABNORMAL
NITRITE UR-MCNC: NEGATIVE MG/ML
PH UR: 6.5 [PH] (ref 5–8)
PROT UR STRIP-MCNC: NEGATIVE MG/DL
RBC # UR STRIP: ABNORMAL /UL
SP GR UR: 1.02 (ref 1–1.03)
UROBILINOGEN UR QL: NORMAL

## 2023-05-10 RX ORDER — BUTALBITAL, ACETAMINOPHEN AND CAFFEINE 50; 325; 40 MG/1; MG/1; MG/1
1 TABLET ORAL EVERY 4 HOURS PRN
Qty: 10 TABLET | Refills: 1 | Status: SHIPPED | OUTPATIENT
Start: 2023-05-10

## 2023-05-10 RX ORDER — CEFDINIR 300 MG/1
300 CAPSULE ORAL 2 TIMES DAILY
Qty: 14 CAPSULE | Refills: 0 | Status: SHIPPED | OUTPATIENT
Start: 2023-05-10

## 2023-05-10 RX ORDER — PROMETHAZINE HYDROCHLORIDE 25 MG/ML
12.5 INJECTION, SOLUTION INTRAMUSCULAR; INTRAVENOUS ONCE
Status: COMPLETED | OUTPATIENT
Start: 2023-05-10 | End: 2023-05-10

## 2023-05-10 RX ADMIN — PROMETHAZINE HYDROCHLORIDE 12.5 MG: 25 INJECTION, SOLUTION INTRAMUSCULAR; INTRAVENOUS at 13:00

## 2023-05-10 NOTE — TELEPHONE ENCOUNTER
Prior auth submitted through cover my meds for butalbital      Approvedtoday  PA Case: [id number], Status: Approved, Coverage Starts on: 2/8/2023 12:00:00 AM, Coverage Ends on: 5/9/2024 12:00:00 AM.

## 2023-05-10 NOTE — PROGRESS NOTES
Markie Dickerson is a 82 y.o. female.     History of Present Illness  82-year-old female with left-sided headache --first 1 in a number of years and dysuria      The following portions of the patient's history were reviewed and updated as appropriate: allergies, current medications, past family history, past medical history, past social history, past surgical history and problem list.    Review of Systems   Cardiovascular: Negative for chest pain.   Genitourinary: Positive for dysuria.   Neurological: Positive for headaches. Negative for tremors, syncope and speech difficulty.        Left-sided headache occipital tenderness       Objective   Physical Exam  Vitals and nursing note reviewed.   Constitutional:       Appearance: Normal appearance.   Eyes:      Extraocular Movements: Extraocular movements intact.      Pupils: Pupils are equal, round, and reactive to light.   Cardiovascular:      Rate and Rhythm: Normal rate and regular rhythm.   Pulmonary:      Effort: Pulmonary effort is normal.      Breath sounds: Normal breath sounds.   Abdominal:      Tenderness: There is no right CVA tenderness or left CVA tenderness.   Musculoskeletal:         General: Tenderness present.      Comments: Left-sided occipital tenderness   Skin:     General: Skin is warm and dry.   Neurological:      General: No focal deficit present.      Mental Status: She is alert and oriented to person, place, and time.   Psychiatric:         Mood and Affect: Mood normal.         Behavior: Behavior normal.         Thought Content: Thought content normal.         Judgment: Judgment normal.         Assessment & Plan   Diagnoses and all orders for this visit:    1. Dysuria (Primary)  -     POC Urinalysis Dipstick, Multipro    2. Microscopic hematuria  -     Urine Culture - Urine, Urine, Clean Catch    3. Acute non intractable tension-type headache  -     promethazine (PHENERGAN) injection 12.5 mg    Other orders  -      butalbital-acetaminophen-caffeine (Esgic) -40 MG per tablet; Take 1 tablet by mouth Every 4 (Four) Hours As Needed for Headache.  Dispense: 10 tablet; Refill: 1  -     cefdinir (OMNICEF) 300 MG capsule; Take 1 capsule by mouth 2 (Two) Times a Day.  Dispense: 14 capsule; Refill: 0       Plan above-patient advised if she starts trending in headaches we will need to get a CT of her head

## 2023-05-15 LAB
BACTERIA UR CULT: ABNORMAL
BACTERIA UR CULT: ABNORMAL
OTHER ANTIBIOTIC SUSC ISLT: ABNORMAL

## 2023-05-17 ENCOUNTER — OFFICE VISIT (OUTPATIENT)
Dept: FAMILY MEDICINE CLINIC | Facility: CLINIC | Age: 83
End: 2023-05-17
Payer: MEDICARE

## 2023-05-17 VITALS
TEMPERATURE: 97.2 F | DIASTOLIC BLOOD PRESSURE: 66 MMHG | HEIGHT: 63 IN | BODY MASS INDEX: 37.78 KG/M2 | RESPIRATION RATE: 18 BRPM | OXYGEN SATURATION: 96 % | SYSTOLIC BLOOD PRESSURE: 110 MMHG | HEART RATE: 83 BPM | WEIGHT: 213.2 LBS

## 2023-05-17 DIAGNOSIS — Z12.31 SCREENING MAMMOGRAM FOR BREAST CANCER: ICD-10-CM

## 2023-05-17 DIAGNOSIS — Z78.0 POST-MENOPAUSAL: ICD-10-CM

## 2023-05-17 DIAGNOSIS — R41.3 MEMORY LOSS: ICD-10-CM

## 2023-05-17 DIAGNOSIS — R53.83 FATIGUE, UNSPECIFIED TYPE: ICD-10-CM

## 2023-05-17 DIAGNOSIS — Z12.12 SCREENING FOR COLORECTAL CANCER: ICD-10-CM

## 2023-05-17 DIAGNOSIS — R73.9 ELEVATED BLOOD SUGAR: Primary | ICD-10-CM

## 2023-05-17 DIAGNOSIS — Z12.4 SCREENING FOR MALIGNANT NEOPLASM OF THE CERVIX: ICD-10-CM

## 2023-05-17 DIAGNOSIS — Z00.00 MEDICARE ANNUAL WELLNESS VISIT, SUBSEQUENT: ICD-10-CM

## 2023-05-17 DIAGNOSIS — E78.2 MIXED HYPERLIPIDEMIA: ICD-10-CM

## 2023-05-17 DIAGNOSIS — Z12.11 SCREENING FOR COLORECTAL CANCER: ICD-10-CM

## 2023-05-17 DIAGNOSIS — E55.9 VITAMIN D DEFICIENCY: ICD-10-CM

## 2023-05-17 LAB
BILIRUB BLD-MCNC: NEGATIVE MG/DL
CLARITY, POC: CLEAR
COLOR UR: YELLOW
GLUCOSE UR STRIP-MCNC: NEGATIVE MG/DL
KETONES UR QL: NEGATIVE
LEUKOCYTE EST, POC: NEGATIVE
NITRITE UR-MCNC: NEGATIVE MG/ML
PH UR: 7 [PH] (ref 5–8)
PROT UR STRIP-MCNC: NEGATIVE MG/DL
RBC # UR STRIP: ABNORMAL /UL
SP GR UR: 1.01 (ref 1–1.03)
UROBILINOGEN UR QL: NORMAL

## 2023-05-17 NOTE — PATIENT INSTRUCTIONS
Advance Care Planning and Advance Directives     You make decisions on a daily basis - decisions about where you want to live, your career, your home, your life. Perhaps one of the most important decisions you face is your choice for future medical care. Take time to talk with your family and your healthcare team and start planning today.  Advance Care Planning is a process that can help you:  · Understand possible future healthcare decisions in light of your own experiences  · Reflect on those decision in light of your goals and values  · Discuss your decisions with those closest to you and the healthcare professionals that care for you  · Make a plan by creating a document that reflects your wishes    Surrogate Decision Maker  In the event of a medical emergency, which has left you unable to communicate or to make your own decisions, you would need someone to make decisions for you.  It is important to discuss your preferences for medical treatment with this person while you are in good health.     Qualities of a surrogate decision maker:  • Willing to take on this role and responsibility  • Knows what you want for future medical care  • Willing to follow your wishes even if they don't agree with them  • Able to make difficult medical decisions under stressful circumstances    Advance Directives  These are legal documents you can create that will guide your healthcare team and decision maker(s) when needed. These documents can be stored in the electronic medical record.    · Living Will - a legal document to guide your care if you have a terminal condition or a serious illness and are unable to communicate. States vary by statute in document names/types, but most forms may include one or more of the following:        -  Directions regarding life-prolonging treatments        -  Directions regarding artificially provided nutrition/hydration        -  Choosing a healthcare decision maker        -  Direction  regarding organ/tissue donation    · Durable Power of  for Healthcare - this document names an -in-fact to make medical decisions for you, but it may also allow this person to make personal and financial decisions for you. Please seek the advice of an  if you need this type of document.    **Advance Directives are not required and no one may discriminate against you if you do not sign one.    Medical Orders  Many states allow specific forms/orders signed by your physician to record your wishes for medical treatment in your current state of health. This form, signed in personal communication with your physician, addresses resuscitation and other medical interventions that you may or may not want.      For more information or to schedule a time with a [facility] Advance Care Planning Facilitator contact: [facility] or call [phone] and someone will contact you directly.    Medicare Wellness  Personal Prevention Plan of Service     Date of Office Visit:    Encounter Provider:  Titi Cotton MD  Place of Service:  [facility]  Patient Name: Hailey Dickerson  :  1940    As part of the Medicare Wellness portion of your visit today, we are providing you with this personalized preventive plan of services (PPPS). This plan is based upon recommendations of the [place] Preventive Services Task Force (USPSTF) and the Advisory Committee on Immunization Practices (ACIP).    This lists the preventive care services that should be considered, and provides dates of when you are due. Items listed as completed are up-to-date and do not require any further intervention.    Health Maintenance   Topic Date Due   • ANNUAL WELLNESS VISIT  2023   • DXA SCAN  2023 (Originally 10/27/2022)   • TDAP/TD VACCINES (2 - Td or Tdap) 05/10/2024 (Originally 2022)   • INFLUENZA VACCINE  2023   • LIPID PANEL  2023   • COLORECTAL  CANCER SCREENING  06/13/2025   • COVID-19 Vaccine  Completed   • Pneumococcal Vaccine 65+  Completed   • ZOSTER VACCINE  Discontinued       No orders of the defined types were placed in this encounter.      No follow-ups on file.

## 2023-05-17 NOTE — PROGRESS NOTES
The ABCs of the Annual Wellness Visit  Subsequent Medicare Wellness Visit    Subjective        Hailey Dickerson is a 82 y.o. female who presents for a Subsequent Medicare Wellness Visit.    The following portions of the patient's history were reviewed and   updated as appropriate: allergies, current medications, past family history, past medical history, past social history, past surgical history and problem list.    Review of Systems   Respiratory: Negative for shortness of breath.    Cardiovascular: Negative for chest pain and leg swelling.        Sees cardiologist yearly   Gastrointestinal:        Cologuard ordered--noticed leakage stool with no blood-Metamucil trial   Genitourinary: Positive for frequency.   Musculoskeletal: Positive for arthralgias.   Neurological: Positive for headaches.   Psychiatric/Behavioral: The patient is nervous/anxious.    All other systems reviewed and are negative.       Compared to one year ago, the patient feels her physical   health is the same.    Compared to one year ago, the patient feels her mental   health is the same.    Recent Hospitalizations:  She was not admitted to the hospital during the last year.       Current Medical Providers:  Patient Care Team:  Titi Cotton MD as PCP - General (Family Medicine)    Outpatient Medications Prior to Visit   Medication Sig Dispense Refill   • alendronate (FOSAMAX) 70 MG tablet Take 1 tablet by mouth Every 7 (Seven) Days. 12 tablet 3   • aspirin 81 MG EC tablet Take 1 tablet by mouth Daily. 30 tablet 0   • cetirizine (zyrTEC) 10 MG tablet Take 1 tablet by mouth Daily.     • Cholecalciferol (Vitamin D) 50 MCG (2000 UT) capsule Take  by mouth Daily.     • citalopram (CeleXA) 20 MG tablet Take 1 tablet by mouth once daily 90 tablet 3   • Cranberry 125 MG tablet Take 1 tablet by mouth Daily.     • famotidine (PEPCID) 20 MG tablet TAKE 2 TABLETS BY MOUTH AT BEDTIME 180 tablet 3   • FIBER PO Take  by mouth Daily.     • indapamide  "(LOZOL) 2.5 MG tablet Take 1 tablet by mouth Daily. 90 tablet 1   • lisinopril (PRINIVIL,ZESTRIL) 10 MG tablet Take 1 tablet by mouth once daily 90 tablet 3   • Multiple Vitamins-Minerals (MULTIVITAMIN WITH MINERALS) tablet tablet Take 1 tablet by mouth Daily.     • Probiotic Product (PROBIOTIC-10 PO) Take  by mouth Daily.     • simvastatin (ZOCOR) 40 MG tablet Take 0.5 tablets by mouth Every Evening. 90 tablet 3   • butalbital-acetaminophen-caffeine (Esgic) -40 MG per tablet Take 1 tablet by mouth Every 4 (Four) Hours As Needed for Headache. (Patient not taking: Reported on 5/17/2023) 10 tablet 1   • cefdinir (OMNICEF) 300 MG capsule Take 1 capsule by mouth 2 (Two) Times a Day. 14 capsule 0     No facility-administered medications prior to visit.       No opioid medication identified on active medication list. I have reviewed chart for other potential  high risk medication/s and harmful drug interactions in the elderly.          Aspirin is on active medication list. Aspirin use is indicated based on review of current medical condition/s. Pros and cons of this therapy have been discussed today. Benefits of this medication outweigh potential harm.  Patient has been encouraged to continue taking this medication.  .      Patient Active Problem List   Diagnosis   • Essential hypertension   • Anxiety   • GERD (gastroesophageal reflux disease)   • Primary osteoarthritis of left knee   • Osteoarthritis of left knee   • Mixed hyperlipidemia   • Morbidly obese     Advance Care Planning  Advance Directive is not on file.  ACP discussion was declined by the patient. Patient does not have an advance directive, declines further assistance.     Objective    Vitals:    05/17/23 1029   BP: 110/66   BP Location: Left arm   Patient Position: Sitting   Cuff Size: Large Adult   Pulse: 83   Resp: 18   Temp: 97.2 °F (36.2 °C)   TempSrc: Temporal   SpO2: 96%   Weight: 96.7 kg (213 lb 3.2 oz)   Height: 160 cm (63\")   PainSc: 0-No " "pain     Estimated body mass index is 37.77 kg/m² as calculated from the following:    Height as of this encounter: 160 cm (63\").    Weight as of this encounter: 96.7 kg (213 lb 3.2 oz).    Class 2 Severe Obesity (BMI >=35 and <=39.9). Obesity-related health conditions include the following: hypertension, dyslipidemias and osteoarthritis. Obesity is unchanged. BMI is is above average; BMI management plan is completed. We discussed portion control and increasing exercise.      Physical Exam  Vitals and nursing note reviewed. Exam conducted with a chaperone present.   Constitutional:       Appearance: She is obese.   HENT:      Right Ear: Tympanic membrane and ear canal normal.      Left Ear: Tympanic membrane and ear canal normal.   Eyes:      Extraocular Movements: Extraocular movements intact.      Pupils: Pupils are equal, round, and reactive to light.   Neck:      Vascular: No carotid bruit.   Cardiovascular:      Rate and Rhythm: Normal rate and regular rhythm.      Pulses: Normal pulses.      Heart sounds: Normal heart sounds.   Pulmonary:      Effort: Pulmonary effort is normal.      Breath sounds: Normal breath sounds.      Comments: Breast no masses noted  Abdominal:      General: Abdomen is flat.      Palpations: Abdomen is soft.   Genitourinary:     Comments: Pelvic-cervix present Pap smear taken atrophic vaginitis bimanual difficult rectal tone down leaking stool  Musculoskeletal:      Right lower leg: No edema.      Left lower leg: No edema.   Lymphadenopathy:      Cervical: No cervical adenopathy.   Skin:     General: Skin is warm and dry.   Neurological:      General: No focal deficit present.      Mental Status: She is alert and oriented to person, place, and time.   Psychiatric:         Mood and Affect: Mood normal.         Behavior: Behavior normal.         Thought Content: Thought content normal.         Judgment: Judgment normal.         Does the patient have evidence of cognitive impairment? "   No            HEALTH RISK ASSESSMENT    Smoking Status:  Social History     Tobacco Use   Smoking Status Never   Smokeless Tobacco Never       Alcohol Consumption:  Social History     Substance and Sexual Activity   Alcohol Use No       Fall Risk Screen:    NURYADI Fall Risk Assessment was completed, and patient is at LOW risk for falls.Assessment completed on:2023    Depression Screenin/17/2023    10:34 AM   PHQ-2/PHQ-9 Depression Screening   Little Interest or Pleasure in Doing Things 0-->not at all   Feeling Down, Depressed or Hopeless 0-->not at all   PHQ-9: Brief Depression Severity Measure Score 0       Health Habits and Functional and Cognitive Screenin/17/2023    10:33 AM   Functional & Cognitive Status   Do you have difficulty preparing food and eating? No   Do you have difficulty bathing yourself, getting dressed or grooming yourself? No   Do you have difficulty using the toilet? No   Do you have difficulty moving around from place to place? No   Do you have trouble with steps or getting out of a bed or a chair? Yes   Current Diet Well Balanced Diet   Dental Exam Up to date   Eye Exam Up to date   Exercise (times per week) 0 times per week   Current Exercises Include No Regular Exercise   Do you need help using the phone?  No   Are you deaf or do you have serious difficulty hearing?  No   Do you need help with transportation? No   Do you need help shopping? No   Do you need help preparing meals?  No   Do you need help with housework?  No   Do you need help with laundry? No   Do you need help taking your medications? No   Do you need help managing money? No   Do you ever drive or ride in a car without wearing a seat belt? No   Have you felt unusual stress, anger or loneliness in the last month? No   Who do you live with? Spouse   If you need help, do you have trouble finding someone available to you? No   Have you been bothered in the last four weeks by sexual problems? No   Do you  have difficulty concentrating, remembering or making decisions? Yes       Age-appropriate Screening Schedule:  Refer to the list below for future screening recommendations based on patient's age, sex and/or medical conditions. Orders for these recommended tests are listed in the plan section. The patient has been provided with a written plan.    Health Maintenance   Topic Date Due   • DXA SCAN  05/17/2023 (Originally 10/27/2022)   • TDAP/TD VACCINES (2 - Td or Tdap) 05/10/2024 (Originally 12/18/2022)   • INFLUENZA VACCINE  08/01/2023   • LIPID PANEL  11/21/2023   • ANNUAL WELLNESS VISIT  05/17/2024   • COLORECTAL CANCER SCREENING  06/13/2025   • COVID-19 Vaccine  Completed   • Pneumococcal Vaccine 65+  Completed   • ZOSTER VACCINE  Discontinued            CMS Preventative Services Quick Reference  Risk Factors Identified During Encounter:    Fall Risk-High or Moderate: Information on Fall Prevention Shared in After Visit Summary    The above risks/problems have been discussed with the patient.  Pertinent information has been shared with the patient in the After Visit Summary.    Diagnoses and all orders for this visit:    1. Elevated blood sugar (Primary)  -     Hemoglobin A1c  -     POC Urinalysis Dipstick, Multipro    2. Fatigue, unspecified type  -     TSH  -     T4, free  -     CBC & Differential    3. Memory loss  -     Vitamin B12  -     Folate    4. Vitamin D deficiency  -     Vitamin D,25-Hydroxy    5. Mixed hyperlipidemia  -     Comprehensive Metabolic Panel  -     Lipid Panel    6. Post-menopausal  -     DEXA Bone Density Axial; Future    7. Screening mammogram for breast cancer  -     Mammo Screening Digital Tomosynthesis Bilateral With CAD; Future    8. Screening for malignant neoplasm of the cervix  -     Pap IG (Image Guided)    9. Medicare annual wellness visit, subsequent    10. Screening for colorectal cancer  -     Cologuard - Stool, Per Rectum; Future      Plan above-encouraged to move more-sees  Dr. Arevalo next week-no reason for not to be more active  Follow Up:   Next Medicare Wellness visit to be scheduled in 1 year.      An After Visit Summary and PPPS were made available to the patient.    Edwardo Cotton MD

## 2023-05-18 LAB
25(OH)D3+25(OH)D2 SERPL-MCNC: 56.6 NG/ML (ref 30–100)
ALBUMIN SERPL-MCNC: 4.5 G/DL (ref 3.5–5.2)
ALBUMIN/GLOB SERPL: 1.7 G/DL
ALP SERPL-CCNC: 61 U/L (ref 39–117)
ALT SERPL-CCNC: 14 U/L (ref 1–33)
AST SERPL-CCNC: 16 U/L (ref 1–32)
BASOPHILS # BLD AUTO: 0.06 10*3/MM3 (ref 0–0.2)
BASOPHILS NFR BLD AUTO: 1 % (ref 0–1.5)
BILIRUB SERPL-MCNC: <0.2 MG/DL (ref 0–1.2)
BUN SERPL-MCNC: 21 MG/DL (ref 8–23)
BUN/CREAT SERPL: 15.4 (ref 7–25)
CALCIUM SERPL-MCNC: 10.1 MG/DL (ref 8.6–10.5)
CHLORIDE SERPL-SCNC: 99 MMOL/L (ref 98–107)
CHOLEST SERPL-MCNC: 182 MG/DL (ref 0–200)
CO2 SERPL-SCNC: 27.8 MMOL/L (ref 22–29)
CREAT SERPL-MCNC: 1.36 MG/DL (ref 0.57–1)
EGFRCR SERPLBLD CKD-EPI 2021: 38.7 ML/MIN/1.73
EOSINOPHIL # BLD AUTO: 0.08 10*3/MM3 (ref 0–0.4)
EOSINOPHIL NFR BLD AUTO: 1.3 % (ref 0.3–6.2)
ERYTHROCYTE [DISTWIDTH] IN BLOOD BY AUTOMATED COUNT: 11.6 % (ref 12.3–15.4)
FOLATE SERPL-MCNC: >20 NG/ML (ref 4.78–24.2)
GLOBULIN SER CALC-MCNC: 2.6 GM/DL
GLUCOSE SERPL-MCNC: 103 MG/DL (ref 65–99)
HBA1C MFR BLD: 5.5 % (ref 4.8–5.6)
HCT VFR BLD AUTO: 38.3 % (ref 34–46.6)
HDLC SERPL-MCNC: 51 MG/DL (ref 40–60)
HGB BLD-MCNC: 13.2 G/DL (ref 12–15.9)
IMM GRANULOCYTES # BLD AUTO: 0.05 10*3/MM3 (ref 0–0.05)
IMM GRANULOCYTES NFR BLD AUTO: 0.8 % (ref 0–0.5)
LDLC SERPL CALC-MCNC: 102 MG/DL (ref 0–100)
LYMPHOCYTES # BLD AUTO: 1.77 10*3/MM3 (ref 0.7–3.1)
LYMPHOCYTES NFR BLD AUTO: 28.1 % (ref 19.6–45.3)
MCH RBC QN AUTO: 33.3 PG (ref 26.6–33)
MCHC RBC AUTO-ENTMCNC: 34.5 G/DL (ref 31.5–35.7)
MCV RBC AUTO: 96.7 FL (ref 79–97)
MONOCYTES # BLD AUTO: 0.59 10*3/MM3 (ref 0.1–0.9)
MONOCYTES NFR BLD AUTO: 9.4 % (ref 5–12)
NEUTROPHILS # BLD AUTO: 3.75 10*3/MM3 (ref 1.7–7)
NEUTROPHILS NFR BLD AUTO: 59.4 % (ref 42.7–76)
NRBC BLD AUTO-RTO: 0 /100 WBC (ref 0–0.2)
PLATELET # BLD AUTO: 384 10*3/MM3 (ref 140–450)
POTASSIUM SERPL-SCNC: 4.5 MMOL/L (ref 3.5–5.2)
PROT SERPL-MCNC: 7.1 G/DL (ref 6–8.5)
RBC # BLD AUTO: 3.96 10*6/MM3 (ref 3.77–5.28)
SODIUM SERPL-SCNC: 139 MMOL/L (ref 136–145)
T4 FREE SERPL-MCNC: 1.16 NG/DL (ref 0.93–1.7)
TRIGL SERPL-MCNC: 170 MG/DL (ref 0–150)
TSH SERPL DL<=0.005 MIU/L-ACNC: 2.41 UIU/ML (ref 0.27–4.2)
VIT B12 SERPL-MCNC: 674 PG/ML (ref 211–946)
VLDLC SERPL CALC-MCNC: 29 MG/DL (ref 5–40)
WBC # BLD AUTO: 6.3 10*3/MM3 (ref 3.4–10.8)

## 2023-05-25 LAB
CYTOLOGIST CVX/VAG CYTO: ABNORMAL
CYTOLOGY CVX/VAG DOC CYTO: ABNORMAL
CYTOLOGY CVX/VAG DOC THIN PREP: ABNORMAL
DX ICD CODE: ABNORMAL
DX ICD CODE: ABNORMAL
HIV 1 & 2 AB SER-IMP: ABNORMAL
OTHER STN SPEC: ABNORMAL
PATHOLOGIST CVX/VAG CYTO: ABNORMAL
RECOM F/U CVX/VAG CYTO: ABNORMAL
STAT OF ADQ CVX/VAG CYTO-IMP: ABNORMAL

## 2023-05-26 DIAGNOSIS — R87.619 ABNORMAL CERVICAL PAPANICOLAOU SMEAR, UNSPECIFIED ABNORMAL PAP FINDING: Primary | ICD-10-CM

## 2023-06-02 ENCOUNTER — OFFICE VISIT (OUTPATIENT)
Dept: FAMILY MEDICINE CLINIC | Facility: CLINIC | Age: 83
End: 2023-06-02

## 2023-06-02 VITALS
HEART RATE: 71 BPM | SYSTOLIC BLOOD PRESSURE: 126 MMHG | TEMPERATURE: 98.6 F | WEIGHT: 212.4 LBS | OXYGEN SATURATION: 99 % | BODY MASS INDEX: 37.63 KG/M2 | DIASTOLIC BLOOD PRESSURE: 81 MMHG | HEIGHT: 63 IN

## 2023-06-02 DIAGNOSIS — R35.0 URINARY FREQUENCY: ICD-10-CM

## 2023-06-02 DIAGNOSIS — R82.90 ABNORMAL URINALYSIS: Primary | ICD-10-CM

## 2023-06-02 LAB
BILIRUB BLD-MCNC: NEGATIVE MG/DL
CLARITY, POC: ABNORMAL
COLOR UR: YELLOW
GLUCOSE UR STRIP-MCNC: NEGATIVE MG/DL
KETONES UR QL: NEGATIVE
LEUKOCYTE EST, POC: ABNORMAL
NITRITE UR-MCNC: NEGATIVE MG/ML
PH UR: 5.5 [PH] (ref 5–8)
PROT UR STRIP-MCNC: NEGATIVE MG/DL
RBC # UR STRIP: ABNORMAL /UL
SP GR UR: 1.01 (ref 1–1.03)
UROBILINOGEN UR QL: ABNORMAL

## 2023-06-02 RX ORDER — METHOCARBAMOL 500 MG/1
TABLET, FILM COATED ORAL
Qty: 30 TABLET | Refills: 2 | Status: SHIPPED | OUTPATIENT
Start: 2023-06-02

## 2023-06-02 RX ORDER — CEFDINIR 300 MG/1
300 CAPSULE ORAL 2 TIMES DAILY
Qty: 14 CAPSULE | Refills: 0 | Status: SHIPPED | OUTPATIENT
Start: 2023-06-02

## 2023-06-02 NOTE — PROGRESS NOTES
Markie Dickerson is a 82 y.o. female.     History of Present Illness  82-year-old with dysuria and headache occipital      The following portions of the patient's history were reviewed and updated as appropriate: allergies, current medications, past family history, past medical history, past social history, past surgical history and problem list.    Review of Systems   Genitourinary: Positive for dysuria and hematuria.   Musculoskeletal: Positive for arthralgias and neck pain.       Objective   Physical Exam  Vitals and nursing note reviewed.   Constitutional:       Appearance: She is obese.   Pulmonary:      Effort: Pulmonary effort is normal.   Abdominal:      Tenderness: There is no right CVA tenderness or left CVA tenderness.   Musculoskeletal:         General: Swelling and tenderness present.      Comments: Occipital tenderness soreness neck   Skin:     General: Skin is warm and dry.   Neurological:      General: No focal deficit present.      Mental Status: She is alert and oriented to person, place, and time.   Psychiatric:         Mood and Affect: Mood normal.         Behavior: Behavior normal.         Thought Content: Thought content normal.         Judgment: Judgment normal.         Assessment & Plan   Diagnoses and all orders for this visit:    1. Abnormal urinalysis (Primary)  -     Urine Culture - Urine, Urine, Clean Catch    2. Urinary frequency  -     POC Urinalysis Dipstick, Multipro  -     Urine Culture - Urine, Urine, Clean Catch    Other orders  -     cefdinir (OMNICEF) 300 MG capsule; Take 1 capsule by mouth 2 (Two) Times a Day.  Dispense: 14 capsule; Refill: 0  -     methocarbamol (ROBAXIN) 500 MG tablet; 1 every 6 hours as needed muscle contraction headache with heat and massage  Dispense: 30 tablet; Refill: 2       Plan above

## 2023-06-04 DIAGNOSIS — I10 ESSENTIAL HYPERTENSION: Primary | ICD-10-CM

## 2023-06-05 RX ORDER — LISINOPRIL 10 MG/1
TABLET ORAL
Qty: 90 TABLET | Refills: 3 | Status: SHIPPED | OUTPATIENT
Start: 2023-06-05

## 2023-06-05 NOTE — TELEPHONE ENCOUNTER
Rx Refill Note  Requested Prescriptions     Pending Prescriptions Disp Refills    lisinopril (PRINIVIL,ZESTRIL) 10 MG tablet [Pharmacy Med Name: Lisinopril 10 MG Oral Tablet] 90 tablet 0     Sig: Take 1 tablet by mouth once daily      Last office visit with prescribing clinician: 6/2/2023   Last telemedicine visit with prescribing clinician: Visit date not found   Next office visit with prescribing clinician: 11/17/2023       {TIP  Please add Last Relevant Lab 5/17/23  La Nena Ha MA  06/05/23, 07:43 CDT

## 2023-06-09 LAB
BACTERIA UR CULT: ABNORMAL
OTHER ANTIBIOTIC SUSC ISLT: ABNORMAL

## 2023-06-19 RX ORDER — FAMOTIDINE 20 MG/1
TABLET, FILM COATED ORAL
Qty: 180 TABLET | Refills: 1 | Status: SHIPPED | OUTPATIENT
Start: 2023-06-19

## 2023-06-19 NOTE — TELEPHONE ENCOUNTER
Rx Refill Note  Requested Prescriptions     Pending Prescriptions Disp Refills    famotidine (PEPCID) 20 MG tablet [Pharmacy Med Name: Famotidine 20 MG Oral Tablet] 180 tablet 0     Sig: TAKE 2 TABLETS BY MOUTH AT BEDTIME      Last office visit with prescribing clinician: 6/2/2023   Last telemedicine visit with prescribing clinician: Visit date not found   Next office visit with prescribing clinician: 11/17/2023    Natasha Monreal MA  06/19/23, 10:06 CDT

## 2023-07-27 RX ORDER — INDAPAMIDE 2.5 MG/1
TABLET, FILM COATED ORAL
Qty: 90 TABLET | Refills: 0 | Status: SHIPPED | OUTPATIENT
Start: 2023-07-27

## 2023-07-27 NOTE — TELEPHONE ENCOUNTER
Rx Refill Note  Requested Prescriptions     Pending Prescriptions Disp Refills    indapamide (LOZOL) 2.5 MG tablet [Pharmacy Med Name: Indapamide 2.5 MG Oral Tablet] 90 tablet 0     Sig: Take 1 tablet by mouth once daily      Last office visit with prescribing clinician: 6/2/2023   Last telemedicine visit with prescribing clinician: Visit date not found   Next office visit with prescribing clinician: 11/17/2023   Natasha Monreal MA  07/27/23, 10:13 CDT

## 2023-09-15 DIAGNOSIS — E78.2 MIXED HYPERLIPIDEMIA: Primary | ICD-10-CM

## 2023-09-15 RX ORDER — SIMVASTATIN 40 MG
TABLET ORAL
Qty: 45 TABLET | Refills: 2 | Status: SHIPPED | OUTPATIENT
Start: 2023-09-15

## 2023-09-15 NOTE — TELEPHONE ENCOUNTER
Rx Refill Note  Requested Prescriptions     Pending Prescriptions Disp Refills    simvastatin (ZOCOR) 40 MG tablet [Pharmacy Med Name: Simvastatin 40 MG Oral Tablet] 45 tablet 0     Sig: TAKE 1/2 (ONE-HALF) TABLET BY MOUTH IN THE EVENING      Last office visit with prescribing clinician: 6/2/2023   Last telemedicine visit with prescribing clinician: Visit date not found   Next office visit with prescribing clinician: 11/17/2023       {TIP  Please add Last Relevant Lab 5/17/23    La Nena Ha MA  09/15/23, 13:55 CDT

## 2023-10-23 DIAGNOSIS — I10 ESSENTIAL HYPERTENSION: Primary | ICD-10-CM

## 2023-10-23 RX ORDER — CITALOPRAM 20 MG/1
TABLET ORAL
Qty: 90 TABLET | Refills: 2 | Status: SHIPPED | OUTPATIENT
Start: 2023-10-23

## 2023-10-23 RX ORDER — INDAPAMIDE 2.5 MG/1
TABLET ORAL
Qty: 90 TABLET | Refills: 2 | Status: SHIPPED | OUTPATIENT
Start: 2023-10-23

## 2023-10-23 NOTE — TELEPHONE ENCOUNTER
Rx Refill Note  Requested Prescriptions     Pending Prescriptions Disp Refills    citalopram (CeleXA) 20 MG tablet [Pharmacy Med Name: Citalopram Hydrobromide 20 MG Oral Tablet] 90 tablet 0     Sig: Take 1 tablet by mouth once daily      Last office visit with prescribing clinician: 6/2/2023   Last telemedicine visit with prescribing clinician: Visit date not found   Next office visit with prescribing clinician: 11/17/2023   {  La Nena Ha MA  10/23/23, 15:25 CDT

## 2023-10-23 NOTE — TELEPHONE ENCOUNTER
Rx Refill Note  Requested Prescriptions     Pending Prescriptions Disp Refills    indapamide (LOZOL) 2.5 MG tablet [Pharmacy Med Name: Indapamide 2.5 MG Oral Tablet] 90 tablet 0     Sig: Take 1 tablet by mouth once daily      Last office visit with prescribing clinician: 3/30/2022   Last telemedicine visit with prescribing clinician: Visit date not found   Next office visit with prescribing clinician: Visit date not found   CPE done 5/17/2023    {TIP  Please add Last Relevant Lab Date if appropriate: 05/17/2023             {TIP  Is Refill Pharmacy correct?: YES      La Nena Avalos MA  10/23/23, 15:49 CDT

## 2023-11-22 ENCOUNTER — OFFICE VISIT (OUTPATIENT)
Dept: FAMILY MEDICINE CLINIC | Facility: CLINIC | Age: 83
End: 2023-11-22
Payer: MEDICARE

## 2023-11-22 VITALS
SYSTOLIC BLOOD PRESSURE: 130 MMHG | OXYGEN SATURATION: 95 % | WEIGHT: 215 LBS | HEIGHT: 63 IN | DIASTOLIC BLOOD PRESSURE: 70 MMHG | BODY MASS INDEX: 38.09 KG/M2 | HEART RATE: 94 BPM | RESPIRATION RATE: 16 BRPM | TEMPERATURE: 97.4 F

## 2023-11-22 DIAGNOSIS — E78.2 MIXED HYPERLIPIDEMIA: Primary | ICD-10-CM

## 2023-11-22 NOTE — PROGRESS NOTES
Markie Dickerson is a 83 y.o. female.     History of Present Illness  83-year-old female with history of hypertension DJD hyperlipidemia      The following portions of the patient's history were reviewed and updated as appropriate: allergies, current medications, past family history, past medical history, past social history, past surgical history, and problem list.    Review of Systems   Respiratory:  Negative for shortness of breath.    Cardiovascular:  Negative for chest pain and leg swelling.   Musculoskeletal:  Positive for arthralgias.       Objective   Physical Exam  Vitals and nursing note reviewed.   Constitutional:       Appearance: She is obese.   Eyes:      Extraocular Movements: Extraocular movements intact.      Pupils: Pupils are equal, round, and reactive to light.   Cardiovascular:      Rate and Rhythm: Normal rate and regular rhythm.   Pulmonary:      Effort: Pulmonary effort is normal.      Breath sounds: Normal breath sounds.   Musculoskeletal:      Right lower leg: No edema.      Left lower leg: No edema.   Skin:     General: Skin is warm and dry.   Neurological:      General: No focal deficit present.      Mental Status: She is alert and oriented to person, place, and time.   Psychiatric:         Mood and Affect: Mood normal.         Judgment: Judgment normal.         Assessment & Plan   Diagnoses and all orders for this visit:    1. Mixed hyperlipidemia (Primary)  -     Comprehensive Metabolic Panel  -     Lipid Panel       Plan above

## 2023-11-23 LAB
ALBUMIN SERPL-MCNC: 4.5 G/DL (ref 3.5–5.2)
ALBUMIN/GLOB SERPL: 1.8 G/DL
ALP SERPL-CCNC: 59 U/L (ref 39–117)
ALT SERPL-CCNC: 11 U/L (ref 1–33)
AST SERPL-CCNC: 16 U/L (ref 1–32)
BILIRUB SERPL-MCNC: 0.4 MG/DL (ref 0–1.2)
BUN SERPL-MCNC: 23 MG/DL (ref 8–23)
BUN/CREAT SERPL: 19.7 (ref 7–25)
CALCIUM SERPL-MCNC: 9.8 MG/DL (ref 8.6–10.5)
CHLORIDE SERPL-SCNC: 101 MMOL/L (ref 98–107)
CHOLEST SERPL-MCNC: 168 MG/DL (ref 0–200)
CO2 SERPL-SCNC: 25.6 MMOL/L (ref 22–29)
CREAT SERPL-MCNC: 1.17 MG/DL (ref 0.57–1)
EGFRCR SERPLBLD CKD-EPI 2021: 46.1 ML/MIN/1.73
GLOBULIN SER CALC-MCNC: 2.5 GM/DL
GLUCOSE SERPL-MCNC: 106 MG/DL (ref 65–99)
HDLC SERPL-MCNC: 44 MG/DL (ref 40–60)
LDLC SERPL CALC-MCNC: 95 MG/DL (ref 0–100)
POTASSIUM SERPL-SCNC: 4.2 MMOL/L (ref 3.5–5.2)
PROT SERPL-MCNC: 7 G/DL (ref 6–8.5)
SODIUM SERPL-SCNC: 139 MMOL/L (ref 136–145)
TRIGL SERPL-MCNC: 170 MG/DL (ref 0–150)
VLDLC SERPL CALC-MCNC: 29 MG/DL (ref 5–40)

## 2023-12-11 RX ORDER — FAMOTIDINE 20 MG/1
TABLET, FILM COATED ORAL
Qty: 180 TABLET | Refills: 3 | Status: SHIPPED | OUTPATIENT
Start: 2023-12-11

## 2023-12-11 NOTE — TELEPHONE ENCOUNTER
Rx Refill Note  Requested Prescriptions     Pending Prescriptions Disp Refills    famotidine (PEPCID) 20 MG tablet [Pharmacy Med Name: Famotidine 20 MG Oral Tablet] 180 tablet 0     Sig: TAKE 2 TABLETS BY MOUTH AT BEDTIME      Last office visit with prescribing clinician: 11/22/2023   Last telemedicine visit with prescribing clinician: Visit date not found   Next office visit with prescribing clinician: 5/22/2024     La Nena Ha MA  12/11/23, 07:38 CST

## 2023-12-15 NOTE — TELEPHONE ENCOUNTER
----- Message from Titi Cotton MD sent at 6/21/2022  6:20 AM CDT -----  Advise patient Cologuard negative  ----- Message -----  From: La Nena Ha MA  Sent: 6/20/2022  10:11 AM CDT  To: Titi Cotton MD      
Patient's  advised results.  
[Negative] : Heme/Lymph

## 2024-01-01 NOTE — TELEPHONE ENCOUNTER
Rx Refill Note  Requested Prescriptions     Pending Prescriptions Disp Refills   • citalopram (CeleXA) 20 MG tablet [Pharmacy Med Name: Citalopram Hydrobromide 20 MG Oral Tablet] 90 tablet 0     Sig: Take 1 tablet by mouth once daily      Last office visit with prescribing clinician: 9/22/2022      Next office visit with prescribing clinician: 11/21/2022            La Nena Ha MA  10/31/22, 07:40 CDT     2024 12:00

## 2024-02-29 ENCOUNTER — TELEPHONE (OUTPATIENT)
Dept: FAMILY MEDICINE CLINIC | Facility: CLINIC | Age: 84
End: 2024-02-29
Payer: MEDICARE

## 2024-02-29 NOTE — TELEPHONE ENCOUNTER
Error--Dr. Cotton called into office earlier about contacting pt for derm referral. Before I could contact patient, she had called and spoke with [facility]--appt scheduled for tomorrow. Pt would like [facility] for referral.

## 2024-03-01 DIAGNOSIS — L98.9 SKIN LESION: Primary | ICD-10-CM

## 2024-05-15 ENCOUNTER — PRIOR AUTHORIZATION (OUTPATIENT)
Dept: FAMILY MEDICINE CLINIC | Facility: CLINIC | Age: 84
End: 2024-05-15

## 2024-05-15 ENCOUNTER — OFFICE VISIT (OUTPATIENT)
Dept: FAMILY MEDICINE CLINIC | Facility: CLINIC | Age: 84
End: 2024-05-15
Payer: MEDICARE

## 2024-05-15 VITALS
SYSTOLIC BLOOD PRESSURE: 120 MMHG | TEMPERATURE: 97 F | HEART RATE: 78 BPM | RESPIRATION RATE: 18 BRPM | OXYGEN SATURATION: 97 % | HEIGHT: 63 IN | BODY MASS INDEX: 37.32 KG/M2 | DIASTOLIC BLOOD PRESSURE: 80 MMHG | WEIGHT: 210.6 LBS

## 2024-05-15 DIAGNOSIS — G89.29 CHRONIC INTRACTABLE HEADACHE, UNSPECIFIED HEADACHE TYPE: ICD-10-CM

## 2024-05-15 DIAGNOSIS — R51.9 CHRONIC INTRACTABLE HEADACHE, UNSPECIFIED HEADACHE TYPE: ICD-10-CM

## 2024-05-15 DIAGNOSIS — G44.86 CERVICOGENIC HEADACHE: Primary | ICD-10-CM

## 2024-05-15 PROCEDURE — 3074F SYST BP LT 130 MM HG: CPT | Performed by: FAMILY MEDICINE

## 2024-05-15 PROCEDURE — 1160F RVW MEDS BY RX/DR IN RCRD: CPT | Performed by: FAMILY MEDICINE

## 2024-05-15 PROCEDURE — 1126F AMNT PAIN NOTED NONE PRSNT: CPT | Performed by: FAMILY MEDICINE

## 2024-05-15 PROCEDURE — 3079F DIAST BP 80-89 MM HG: CPT | Performed by: FAMILY MEDICINE

## 2024-05-15 PROCEDURE — 1159F MED LIST DOCD IN RCRD: CPT | Performed by: FAMILY MEDICINE

## 2024-05-15 PROCEDURE — 99213 OFFICE O/P EST LOW 20 MIN: CPT | Performed by: FAMILY MEDICINE

## 2024-05-15 RX ORDER — BUTALBITAL, ASPIRIN, AND CAFFEINE 50; 325; 40 MG/1; MG/1; MG/1
1 CAPSULE ORAL EVERY 6 HOURS PRN
Qty: 25 CAPSULE | Refills: 0 | Status: SHIPPED | OUTPATIENT
Start: 2024-05-15

## 2024-05-15 NOTE — PROGRESS NOTES
Markie Dickerson is a 83 y.o. female.     History of Present Illness  83-year-old female with headache      The following portions of the patient's history were reviewed and updated as appropriate: allergies, current medications, past family history, past medical history, past social history, past surgical history, and problem list.    Review of Systems   Respiratory:  Negative for shortness of breath.    Cardiovascular:  Negative for chest pain and leg swelling.   Musculoskeletal:  Positive for back pain and neck pain.   Neurological:  Positive for weakness and headaches.       Objective   Physical Exam  Vitals and nursing note reviewed.   Constitutional:       Appearance: She is obese.   HENT:      Mouth/Throat:      Mouth: Mucous membranes are moist.      Pharynx: Oropharynx is clear.   Eyes:      Extraocular Movements: Extraocular movements intact.      Pupils: Pupils are equal, round, and reactive to light.   Cardiovascular:      Rate and Rhythm: Normal rate and regular rhythm.   Pulmonary:      Effort: Pulmonary effort is normal.      Breath sounds: Normal breath sounds.   Musculoskeletal:      Right lower leg: No edema.      Left lower leg: No edema.   Skin:     General: Skin is warm and dry.   Neurological:      General: No focal deficit present.      Mental Status: She is alert and oriented to person, place, and time.   Psychiatric:         Mood and Affect: Mood normal.         Behavior: Behavior normal.         Thought Content: Thought content normal.         Judgment: Judgment normal.         Assessment & Plan   Diagnoses and all orders for this visit:    1. Cervicogenic headache (Primary)  -     butalbital-aspirin-caffeine (FIORINAL) -40 MG per capsule; Take 1 capsule by mouth Every 6 (Six) Hours As Needed for Headache.  Dispense: 25 capsule; Refill: 0  -     Cancel: CT Head Without Contrast; Future  -     CT Head Without Contrast; Future    2. Chronic intractable headache, unspecified  headache type  -     Cancel: CT Head Without Contrast; Future  -     CT Head Without Contrast; Future       Plan above

## 2024-05-15 NOTE — TELEPHONE ENCOUNTER
Outcome  Approved today  PA Case: [id number], Status: Approved, Coverage Starts on: 2/14/2024 12:00:00 AM, Coverage Ends on: 5/15/2025 12:00:00 AM.  Authorization Expiration Date: 5/14/2025    Faxed approval to pharmacy

## 2024-05-19 DIAGNOSIS — I10 ESSENTIAL HYPERTENSION: ICD-10-CM

## 2024-05-20 RX ORDER — LISINOPRIL 10 MG/1
TABLET ORAL
Qty: 90 TABLET | Refills: 3 | Status: SHIPPED | OUTPATIENT
Start: 2024-05-20

## 2024-05-20 NOTE — TELEPHONE ENCOUNTER
Rx Refill Note  Requested Prescriptions     Pending Prescriptions Disp Refills    lisinopril (PRINIVIL,ZESTRIL) 10 MG tablet [Pharmacy Med Name: Lisinopril 10 MG Oral Tablet] 90 tablet 0     Sig: Take 1 tablet by mouth once daily      Last office visit with prescribing clinician: 5/15/2024   Last telemedicine visit with prescribing clinician: Visit date not found   Next office visit with prescribing clinician: 5/21/2024       {TIP  Please add Last Relevant Lab 11/22/23    La Nena aH MA  05/20/24, 07:36 CDT

## 2024-05-21 ENCOUNTER — OFFICE VISIT (OUTPATIENT)
Dept: FAMILY MEDICINE CLINIC | Facility: CLINIC | Age: 84
End: 2024-05-21
Payer: MEDICARE

## 2024-05-21 ENCOUNTER — PATIENT ROUNDING (BHMG ONLY) (OUTPATIENT)
Dept: FAMILY MEDICINE CLINIC | Facility: CLINIC | Age: 84
End: 2024-05-21

## 2024-05-21 VITALS
BODY MASS INDEX: 37.42 KG/M2 | HEIGHT: 63 IN | OXYGEN SATURATION: 97 % | DIASTOLIC BLOOD PRESSURE: 78 MMHG | HEART RATE: 78 BPM | TEMPERATURE: 97.8 F | RESPIRATION RATE: 18 BRPM | SYSTOLIC BLOOD PRESSURE: 124 MMHG | WEIGHT: 211.2 LBS

## 2024-05-21 DIAGNOSIS — Z00.00 MEDICARE ANNUAL WELLNESS VISIT, SUBSEQUENT: ICD-10-CM

## 2024-05-21 DIAGNOSIS — Z12.31 SCREENING MAMMOGRAM FOR BREAST CANCER: ICD-10-CM

## 2024-05-21 DIAGNOSIS — E55.9 VITAMIN D DEFICIENCY: ICD-10-CM

## 2024-05-21 DIAGNOSIS — I10 ESSENTIAL HYPERTENSION: Primary | ICD-10-CM

## 2024-05-21 DIAGNOSIS — E78.2 MIXED HYPERLIPIDEMIA: ICD-10-CM

## 2024-05-21 DIAGNOSIS — R82.90 ABNORMAL URINE: ICD-10-CM

## 2024-05-21 DIAGNOSIS — R41.3 MEMORY LOSS: ICD-10-CM

## 2024-05-21 DIAGNOSIS — Z78.0 POSTMENOPAUSAL: ICD-10-CM

## 2024-05-21 DIAGNOSIS — R53.83 OTHER FATIGUE: ICD-10-CM

## 2024-05-21 DIAGNOSIS — R31.29 MICROSCOPIC HEMATURIA: Primary | ICD-10-CM

## 2024-05-21 LAB
BILIRUB BLD-MCNC: NEGATIVE MG/DL
CLARITY, POC: CLEAR
COLOR UR: YELLOW
GLUCOSE UR STRIP-MCNC: NEGATIVE MG/DL
KETONES UR QL: NEGATIVE
LEUKOCYTE EST, POC: ABNORMAL
NITRITE UR-MCNC: POSITIVE MG/ML
PH UR: 6.5 [PH] (ref 5–8)
PROT UR STRIP-MCNC: NEGATIVE MG/DL
RBC # UR STRIP: ABNORMAL /UL
SP GR UR: 1.01 (ref 1–1.03)
UROBILINOGEN UR QL: NORMAL

## 2024-05-21 PROCEDURE — 81003 URINALYSIS AUTO W/O SCOPE: CPT | Performed by: FAMILY MEDICINE

## 2024-05-21 PROCEDURE — G0439 PPPS, SUBSEQ VISIT: HCPCS | Performed by: FAMILY MEDICINE

## 2024-05-21 PROCEDURE — 1126F AMNT PAIN NOTED NONE PRSNT: CPT | Performed by: FAMILY MEDICINE

## 2024-05-21 PROCEDURE — 3074F SYST BP LT 130 MM HG: CPT | Performed by: FAMILY MEDICINE

## 2024-05-21 PROCEDURE — 3078F DIAST BP <80 MM HG: CPT | Performed by: FAMILY MEDICINE

## 2024-05-21 PROCEDURE — 1159F MED LIST DOCD IN RCRD: CPT | Performed by: FAMILY MEDICINE

## 2024-05-21 PROCEDURE — 1170F FXNL STATUS ASSESSED: CPT | Performed by: FAMILY MEDICINE

## 2024-05-21 RX ORDER — CEFDINIR 300 MG/1
300 CAPSULE ORAL 2 TIMES DAILY
Qty: 14 CAPSULE | Refills: 0 | Status: SHIPPED | OUTPATIENT
Start: 2024-05-21 | End: 2024-05-28

## 2024-05-21 NOTE — PROGRESS NOTES
May 21, 2024    Hello, may I speak with Hailey Dickerson?    My name is Beth      I am  with [facility]  [address], [address]  [place] [place] [id number] [phone].    Before we get started may I verify your date of birth? 1940    I am calling to officially welcome you to our practice and ask about your recent visit. Is this a good time to talk? yes    Tell me about your visit with us. What things went well?  Everyone was so Nice!  All went Well!       We're always looking for ways to make our patients' experiences even better. Do you have recommendations on ways we may improve? none     Overall were you satisfied with your first visit to our practice? yes       I appreciate you taking the time to speak with me today. Is there anything else I can do for you? no      Thank you, and have a great day.

## 2024-05-21 NOTE — PROGRESS NOTES
The ABCs of the Annual Wellness Visit  Subsequent Medicare Wellness Visit    Markie Dickerson is a 83 y.o. female who presents for a Subsequent Medicare Wellness Visit.    The following portions of the patient's history were reviewed and   updated as appropriate: allergies, current medications, past family history, past medical history, past social history, past surgical history, and problem list.    Review of Systems   Respiratory:  Negative for shortness of breath.    Cardiovascular:  Negative for chest pain and leg swelling.   Gastrointestinal:         Cologuard up-to-date-history of diverticulosis   Genitourinary:  Positive for frequency.        Patient refuses pelvic exam   Musculoskeletal:  Positive for arthralgias and neck pain.   Neurological:  Positive for headaches.        Muscle contraction headaches-recent CT of head negative   All other systems reviewed and are negative.       Compared to one year ago, the patient feels her physical   health is the same.    Compared to one year ago, the patient feels her mental   health is the same.    Recent Hospitalizations:  She was not admitted to the hospital during the last year.       Current Medical Providers:  Patient Care Team:  Titi Cotton MD as PCP - General (Family Medicine)    Outpatient Medications Prior to Visit   Medication Sig Dispense Refill    aspirin 81 MG EC tablet Take 1 tablet by mouth Daily. 30 tablet 0    butalbital-aspirin-caffeine (FIORINAL) -40 MG per capsule Take 1 capsule by mouth Every 6 (Six) Hours As Needed for Headache. 25 capsule 0    cetirizine (zyrTEC) 10 MG tablet Take 1 tablet by mouth Daily.      Cholecalciferol (Vitamin D) 50 MCG (2000 UT) capsule Take  by mouth Daily.      citalopram (CeleXA) 20 MG tablet Take 1 tablet by mouth once daily 90 tablet 2    Cranberry 125 MG tablet Take 1 tablet by mouth Daily.      famotidine (PEPCID) 20 MG tablet TAKE 2 TABLETS BY MOUTH AT BEDTIME 180 tablet 3     "FIBER PO Take  by mouth Daily.      indapamide (LOZOL) 2.5 MG tablet Take 1 tablet by mouth once daily 90 tablet 2    lisinopril (PRINIVIL,ZESTRIL) 10 MG tablet Take 1 tablet by mouth once daily 90 tablet 3    Multiple Vitamins-Minerals (MULTIVITAMIN WITH MINERALS) tablet tablet Take 1 tablet by mouth Daily.      Probiotic Product (PROBIOTIC-10 PO) Take  by mouth Daily.      simvastatin (ZOCOR) 40 MG tablet TAKE 1/2 (ONE-HALF) TABLET BY MOUTH IN THE EVENING 45 tablet 2     No facility-administered medications prior to visit.       No opioid medication identified on active medication list. I have reviewed chart for other potential  high risk medication/s and harmful drug interactions in the elderly.        Aspirin is on active medication list. Aspirin use is indicated based on review of current medical condition/s. Pros and cons of this therapy have been discussed today. Benefits of this medication outweigh potential harm.  Patient has been encouraged to continue taking this medication.  .      Patient Active Problem List   Diagnosis    Essential hypertension    Anxiety    GERD (gastroesophageal reflux disease)    Primary osteoarthritis of left knee    Osteoarthritis of left knee    Mixed hyperlipidemia    Morbidly obese     Advance Care Planning  Advance Directive is not on file.  ACP discussion was declined by the patient. Patient does not have an advance directive, declines further assistance.     Objective    Vitals:    05/21/24 0917   BP: 124/78   BP Location: Left arm   Patient Position: Sitting   Cuff Size: Large Adult   Pulse: 78   Resp: 18   Temp: 97.8 °F (36.6 °C)   TempSrc: Temporal   SpO2: 97%   Weight: 95.8 kg (211 lb 3.2 oz)   Height: 160 cm (63\")   PainSc: 0-No pain     Estimated body mass index is 37.41 kg/m² as calculated from the following:    Height as of this encounter: 160 cm (63\").    Weight as of this encounter: 95.8 kg (211 lb 3.2 oz).    Class 2 Severe Obesity (BMI >=35 and <=39.9). " Obesity-related health conditions include the following: hypertension and osteoarthritis. Obesity is unchanged. BMI is is above average; no BMI management plan is appropriate. We discussed portion control and increasing exercise.      Physical Exam  Vitals and nursing note reviewed.   Constitutional:       Appearance: She is obese.   HENT:      Right Ear: Tympanic membrane and ear canal normal.      Left Ear: Tympanic membrane and ear canal normal.   Eyes:      Extraocular Movements: Extraocular movements intact.      Pupils: Pupils are equal, round, and reactive to light.   Neck:      Vascular: No carotid bruit.   Cardiovascular:      Rate and Rhythm: Normal rate and regular rhythm.      Pulses: Normal pulses.      Heart sounds: Normal heart sounds.   Pulmonary:      Effort: Pulmonary effort is normal.      Breath sounds: Normal breath sounds.   Abdominal:      General: Abdomen is flat.      Palpations: Abdomen is soft.   Genitourinary:     Comments:  pelvic refused the patient  Musculoskeletal:      Right lower leg: No edema.      Left lower leg: No edema.   Lymphadenopathy:      Cervical: No cervical adenopathy.   Skin:     General: Skin is warm and dry.   Neurological:      General: No focal deficit present.      Mental Status: She is alert and oriented to person, place, and time.   Psychiatric:         Mood and Affect: Mood normal.         Behavior: Behavior normal.         Thought Content: Thought content normal.         Judgment: Judgment normal.         Does the patient have evidence of cognitive impairment?   No            HEALTH RISK ASSESSMENT    Smoking Status:  Social History     Tobacco Use   Smoking Status Never   Smokeless Tobacco Never       Alcohol Consumption:  Social History     Substance and Sexual Activity   Alcohol Use No       Fall Risk Screen:    STEADI Fall Risk Assessment was completed, and patient is at LOW risk for falls.Assessment completed on:5/21/2024    Depression Screening:       2024     9:18 AM   PHQ-2/PHQ-9 Depression Screening   Little Interest or Pleasure in Doing Things 0-->not at all   Feeling Down, Depressed or Hopeless 0-->not at all   PHQ-9: Brief Depression Severity Measure Score 0       Health Habits and Functional and Cognitive Screenin/21/2024     9:18 AM   Functional & Cognitive Status   Do you have difficulty preparing food and eating? No   Do you have difficulty bathing yourself, getting dressed or grooming yourself? No   Do you have difficulty using the toilet? No   Do you have difficulty moving around from place to place? No   Do you have trouble with steps or getting out of a bed or a chair? Yes   Current Diet Well Balanced Diet   Dental Exam Up to date   Eye Exam Up to date   Exercise (times per week) 0 times per week   Current Exercises Include No Regular Exercise   Do you need help using the phone?  No   Are you deaf or do you have serious difficulty hearing?  No   Do you need help to go to places out of walking distance? No   Do you need help shopping? No   Do you need help preparing meals?  No   Do you need help with housework?  No   Do you need help with laundry? No   Do you need help taking your medications? No   Do you need help managing money? No   Do you ever drive or ride in a car without wearing a seat belt? No   Have you felt unusual stress, anger or loneliness in the last month? No   Who do you live with? Spouse   If you need help, do you have trouble finding someone available to you? No   Have you been bothered in the last four weeks by sexual problems? No   Do you have difficulty concentrating, remembering or making decisions? No       Age-appropriate Screening Schedule:  Refer to the list below for future screening recommendations based on patient's age, sex and/or medical conditions. Orders for these recommended tests are listed in the plan section. The patient has been provided with a written plan.    Health Maintenance   Topic Date Due     COVID-19 Vaccine (6 - 2023-24 season) 05/23/2024 (Originally 10/16/2023)    TDAP/TD VACCINES (2 - Td or Tdap) 05/15/2025 (Originally 12/18/2022)    INFLUENZA VACCINE  08/01/2024    LIPID PANEL  11/22/2024    ANNUAL WELLNESS VISIT  05/21/2025    BMI FOLLOWUP  05/21/2025    COLORECTAL CANCER SCREENING  06/13/2025    DXA SCAN  11/27/2025    RSV Vaccine - Adults  Completed    Pneumococcal Vaccine 65+  Completed    ZOSTER VACCINE  Discontinued            CMS Preventative Services Quick Reference  Risk Factors Identified During Encounter:    Fall Risk-High or Moderate: Information on Fall Prevention Shared in After Visit Summary    The above risks/problems have been discussed with the patient.  Pertinent information has been shared with the patient in the After Visit Summary.    Diagnoses and all orders for this visit:    1. Essential hypertension (Primary)  -     POC Urinalysis Dipstick, Multipro    2. Mixed hyperlipidemia  -     Comprehensive Metabolic Panel  -     Lipid Panel    3. Other fatigue  -     TSH  -     T4, free  -     CBC & Differential    4. Vitamin D deficiency  -     Vitamin D,25-Hydroxy    5. Memory loss  -     Vitamin B12  -     Folate    6. Screening mammogram for breast cancer  -     Mammo Screening Digital Tomosynthesis Bilateral With CAD; Future    7. Postmenopausal    8. Medicare annual wellness visit, subsequent    9. Abnormal urine  -     Urine Culture - Urine, Urine, Clean Catch      Antibiotics called to [facility] twice daily 7 days  Follow Up:   Next Medicare Wellness visit to be scheduled in 1 year.      An After Visit Summary and PPPS were made available to the patient.    Edwardo Cotton MD

## 2024-05-21 NOTE — PATIENT INSTRUCTIONS
Advance Care Planning and Advance Directives     You make decisions on a daily basis - decisions about where you want to live, your career, your home, your life. Perhaps one of the most important decisions you face is your choice for future medical care. Take time to talk with your family and your healthcare team and start planning today.  Advance Care Planning is a process that can help you:  Understand possible future healthcare decisions in light of your own experiences  Reflect on those decision in light of your goals and values  Discuss your decisions with those closest to you and the healthcare professionals that care for you  Make a plan by creating a document that reflects your wishes    Surrogate Decision Maker  In the event of a medical emergency, which has left you unable to communicate or to make your own decisions, you would need someone to make decisions for you.  It is important to discuss your preferences for medical treatment with this person while you are in good health.     Qualities of a surrogate decision maker:  Willing to take on this role and responsibility  Knows what you want for future medical care  Willing to follow your wishes even if they don't agree with them  Able to make difficult medical decisions under stressful circumstances    Advance Directives  These are legal documents you can create that will guide your healthcare team and decision maker(s) when needed. These documents can be stored in the electronic medical record.    Living Will - a legal document to guide your care if you have a terminal condition or a serious illness and are unable to communicate. States vary by statute in document names/types, but most forms may include one or more of the following:        -  Directions regarding life-prolonging treatments        -  Directions regarding artificially provided nutrition/hydration        -  Choosing a healthcare decision maker        -  Direction regarding organ/tissue  donation    Durable Power of  for Healthcare - this document names an -in-fact to make medical decisions for you, but it may also allow this person to make personal and financial decisions for you. Please seek the advice of an  if you need this type of document.    **Advance Directives are not required and no one may discriminate against you if you do not sign one.    Medical Orders  Many states allow specific forms/orders signed by your physician to record your wishes for medical treatment in your current state of health. This form, signed in personal communication with your physician, addresses resuscitation and other medical interventions that you may or may not want.      For more information or to schedule a time with a [facility] Advance Care Planning Facilitator contact: [employer] or call [phone] and someone will contact you directly.    Medicare Wellness  Personal Prevention Plan of Service     Date of Office Visit:    Encounter Provider:  Titi Cotton MD  Place of Service:  [facility]  Patient Name: Hailey Dickerson  :  1940    As part of the Medicare Wellness portion of your visit today, we are providing you with this personalized preventive plan of services (PPPS). This plan is based upon recommendations of the [place] Preventive Services Task Force (USPSTF) and the Advisory Committee on Immunization Practices (ACIP).    This lists the preventive care services that should be considered, and provides dates of when you are due. Items listed as completed are up-to-date and do not require any further intervention.    Health Maintenance   Topic Date Due   • COVID-19 Vaccine ( season) 10/16/2023   • ANNUAL WELLNESS VISIT  2024   • BMI FOLLOWUP  2024   • TDAP/TD VACCINES (2 - Td or Tdap) 05/15/2025 (Originally 2022)   • INFLUENZA VACCINE  2024   • LIPID PANEL  2024   •  COLORECTAL CANCER SCREENING  06/13/2025   • DXA SCAN  11/27/2025   • RSV Vaccine - Adults  Completed   • Pneumococcal Vaccine 65+  Completed   • ZOSTER VACCINE  Discontinued       No orders of the defined types were placed in this encounter.      No follow-ups on file.

## 2024-05-22 LAB
25(OH)D3+25(OH)D2 SERPL-MCNC: 67.2 NG/ML (ref 30–100)
ALBUMIN SERPL-MCNC: 4.4 G/DL (ref 3.5–5.2)
ALBUMIN/GLOB SERPL: 1.8 G/DL
ALP SERPL-CCNC: 60 U/L (ref 39–117)
ALT SERPL-CCNC: 9 U/L (ref 1–33)
AST SERPL-CCNC: 13 U/L (ref 1–32)
BASOPHILS # BLD AUTO: 0.09 10*3/MM3 (ref 0–0.2)
BASOPHILS NFR BLD AUTO: 1.6 % (ref 0–1.5)
BILIRUB SERPL-MCNC: 0.3 MG/DL (ref 0–1.2)
BUN SERPL-MCNC: 22 MG/DL (ref 8–23)
BUN/CREAT SERPL: 17.6 (ref 7–25)
CALCIUM SERPL-MCNC: 9.8 MG/DL (ref 8.6–10.5)
CHLORIDE SERPL-SCNC: 101 MMOL/L (ref 98–107)
CHOLEST SERPL-MCNC: 174 MG/DL (ref 0–200)
CO2 SERPL-SCNC: 24.9 MMOL/L (ref 22–29)
CREAT SERPL-MCNC: 1.25 MG/DL (ref 0.57–1)
EGFRCR SERPLBLD CKD-EPI 2021: 42.6 ML/MIN/1.73
EOSINOPHIL # BLD AUTO: 0.15 10*3/MM3 (ref 0–0.4)
EOSINOPHIL NFR BLD AUTO: 2.6 % (ref 0.3–6.2)
ERYTHROCYTE [DISTWIDTH] IN BLOOD BY AUTOMATED COUNT: 11.9 % (ref 12.3–15.4)
FOLATE SERPL-MCNC: >20 NG/ML (ref 4.78–24.2)
GLOBULIN SER CALC-MCNC: 2.5 GM/DL
GLUCOSE SERPL-MCNC: 108 MG/DL (ref 65–99)
HCT VFR BLD AUTO: 40.9 % (ref 34–46.6)
HDLC SERPL-MCNC: 45 MG/DL (ref 40–60)
HGB BLD-MCNC: 13.6 G/DL (ref 12–15.9)
IMM GRANULOCYTES # BLD AUTO: 0.02 10*3/MM3 (ref 0–0.05)
IMM GRANULOCYTES NFR BLD AUTO: 0.3 % (ref 0–0.5)
LDLC SERPL CALC-MCNC: 95 MG/DL (ref 0–100)
LYMPHOCYTES # BLD AUTO: 1.83 10*3/MM3 (ref 0.7–3.1)
LYMPHOCYTES NFR BLD AUTO: 31.8 % (ref 19.6–45.3)
MCH RBC QN AUTO: 31.7 PG (ref 26.6–33)
MCHC RBC AUTO-ENTMCNC: 33.3 G/DL (ref 31.5–35.7)
MCV RBC AUTO: 95.3 FL (ref 79–97)
MONOCYTES # BLD AUTO: 0.59 10*3/MM3 (ref 0.1–0.9)
MONOCYTES NFR BLD AUTO: 10.2 % (ref 5–12)
NEUTROPHILS # BLD AUTO: 3.08 10*3/MM3 (ref 1.7–7)
NEUTROPHILS NFR BLD AUTO: 53.5 % (ref 42.7–76)
NRBC BLD AUTO-RTO: 0 /100 WBC (ref 0–0.2)
PLATELET # BLD AUTO: 302 10*3/MM3 (ref 140–450)
POTASSIUM SERPL-SCNC: 4.3 MMOL/L (ref 3.5–5.2)
PROT SERPL-MCNC: 6.9 G/DL (ref 6–8.5)
RBC # BLD AUTO: 4.29 10*6/MM3 (ref 3.77–5.28)
SODIUM SERPL-SCNC: 138 MMOL/L (ref 136–145)
T4 FREE SERPL-MCNC: 1.11 NG/DL (ref 0.93–1.7)
TRIGL SERPL-MCNC: 201 MG/DL (ref 0–150)
TSH SERPL DL<=0.005 MIU/L-ACNC: 2.22 UIU/ML (ref 0.27–4.2)
VIT B12 SERPL-MCNC: 777 PG/ML (ref 211–946)
VLDLC SERPL CALC-MCNC: 34 MG/DL (ref 5–40)
WBC # BLD AUTO: 5.76 10*3/MM3 (ref 3.4–10.8)

## 2024-05-25 LAB
BACTERIA UR CULT: ABNORMAL
BACTERIA UR CULT: ABNORMAL
OTHER ANTIBIOTIC SUSC ISLT: ABNORMAL

## 2024-05-29 ENCOUNTER — TELEPHONE (OUTPATIENT)
Dept: FAMILY MEDICINE CLINIC | Facility: CLINIC | Age: 84
End: 2024-05-29
Payer: MEDICARE

## 2024-05-29 RX ORDER — AMOXICILLIN 500 MG/1
500 CAPSULE ORAL 3 TIMES DAILY
Qty: 21 CAPSULE | Refills: 0 | Status: SHIPPED | OUTPATIENT
Start: 2024-05-29 | End: 2024-06-05

## 2024-05-29 NOTE — TELEPHONE ENCOUNTER
Hub staff attempted to follow warm transfer process and was unsuccessful     Caller: Hailey Dickerson    Relationship to patient: Self    Best call back number: [phone]     Patient is needing: RETURNING CALL TO [facility]

## 2024-05-29 NOTE — TELEPHONE ENCOUNTER
Spoke with patient.  Requesting medication for UTI.  When patient was advised culture results she stated not symptomatic but now she is having UTI symptoms.

## 2024-06-04 DIAGNOSIS — E78.2 MIXED HYPERLIPIDEMIA: ICD-10-CM

## 2024-06-04 RX ORDER — SIMVASTATIN 40 MG
TABLET ORAL
Qty: 45 TABLET | Refills: 0 | Status: SHIPPED | OUTPATIENT
Start: 2024-06-04

## 2024-06-04 NOTE — TELEPHONE ENCOUNTER
Rx Refill Note  Requested Prescriptions     Pending Prescriptions Disp Refills    simvastatin (ZOCOR) 40 MG tablet [Pharmacy Med Name: Simvastatin 40 MG Oral Tablet] 45 tablet 0     Sig: TAKE 1/2 (ONE-HALF) TABLET BY MOUTH IN THE EVENING      Last office visit with prescribing clinician: 5/21/2024   Last telemedicine visit with prescribing clinician: Visit date not found   Next office visit with prescribing clinician: 12/4/2024                         Would you like a call back once the refill request has been completed: [] Yes [] No    If the office needs to give you a call back, can they leave a voicemail: [] Yes [] No    Shruti Goins, PCT  06/04/24, 07:39 CDT

## 2024-07-03 ENCOUNTER — OFFICE VISIT (OUTPATIENT)
Dept: FAMILY MEDICINE CLINIC | Facility: CLINIC | Age: 84
End: 2024-07-03
Payer: MEDICARE

## 2024-07-03 VITALS
SYSTOLIC BLOOD PRESSURE: 130 MMHG | TEMPERATURE: 98.1 F | RESPIRATION RATE: 18 BRPM | HEIGHT: 63 IN | OXYGEN SATURATION: 98 % | HEART RATE: 86 BPM | BODY MASS INDEX: 37.14 KG/M2 | WEIGHT: 209.6 LBS | DIASTOLIC BLOOD PRESSURE: 78 MMHG

## 2024-07-03 DIAGNOSIS — M79.604 PAIN OF RIGHT LOWER EXTREMITY: Primary | ICD-10-CM

## 2024-07-03 PROCEDURE — 1126F AMNT PAIN NOTED NONE PRSNT: CPT | Performed by: FAMILY MEDICINE

## 2024-07-03 PROCEDURE — 3075F SYST BP GE 130 - 139MM HG: CPT | Performed by: FAMILY MEDICINE

## 2024-07-03 PROCEDURE — 3078F DIAST BP <80 MM HG: CPT | Performed by: FAMILY MEDICINE

## 2024-07-03 PROCEDURE — 1160F RVW MEDS BY RX/DR IN RCRD: CPT | Performed by: FAMILY MEDICINE

## 2024-07-03 PROCEDURE — 99213 OFFICE O/P EST LOW 20 MIN: CPT | Performed by: FAMILY MEDICINE

## 2024-07-03 PROCEDURE — 1159F MED LIST DOCD IN RCRD: CPT | Performed by: FAMILY MEDICINE

## 2024-07-03 NOTE — PROGRESS NOTES
Markie Dickerson is a 84 y.o. female.     History of Present Illness  84-year-old with history of right leg pain that dissipated on its own      The following portions of the patient's history were reviewed and updated as appropriate: allergies, current medications, past family history, past medical history, past social history, past surgical history, and problem list.    Review of Systems   Respiratory:  Negative for shortness of breath.    Cardiovascular:  Negative for chest pain and leg swelling.   Gastrointestinal:  Positive for diarrhea.   Musculoskeletal:  Positive for arthralgias.        Right lateral leg pain       Objective   Physical Exam  Vitals and nursing note reviewed.   Constitutional:       Appearance: She is obese.   Eyes:      Extraocular Movements: Extraocular movements intact.      Pupils: Pupils are equal, round, and reactive to light.   Cardiovascular:      Rate and Rhythm: Normal rate and regular rhythm.   Pulmonary:      Effort: Pulmonary effort is normal.      Breath sounds: Normal breath sounds.   Musculoskeletal:         General: No swelling or tenderness.      Right lower leg: Edema present.      Left lower leg: Edema present.      Comments: Patient advised to get zip up GILBERT hose-Homans' sign negative-no cords no redness   Skin:     General: Skin is warm and dry.   Neurological:      General: No focal deficit present.      Mental Status: She is alert and oriented to person, place, and time.   Psychiatric:         Mood and Affect: Mood normal.         Behavior: Behavior normal.         Thought Content: Thought content normal.         Judgment: Judgment normal.         Assessment & Plan   Diagnoses and all orders for this visit:    1. Pain of right lower extremity (Primary)         Plan observation-probably positional ankle discomfort from sleeping with spontaneous resolution over a days time

## 2024-07-15 RX ORDER — CITALOPRAM 20 MG/1
TABLET ORAL
Qty: 90 TABLET | Refills: 3 | Status: SHIPPED | OUTPATIENT
Start: 2024-07-15

## 2024-07-15 NOTE — TELEPHONE ENCOUNTER
Rx Refill Note  Requested Prescriptions     Pending Prescriptions Disp Refills    citalopram (CeleXA) 20 MG tablet [Pharmacy Med Name: Citalopram Hydrobromide 20 MG Oral Tablet] 90 tablet 0     Sig: Take 1 tablet by mouth once daily      Last office visit with prescribing clinician: 7/3/2024   Last telemedicine visit with prescribing clinician: Visit date not found   Next office visit with prescribing clinician: 12/4/2024     La Nena Ha MA  07/15/24, 07:37 CDT

## 2024-08-11 DIAGNOSIS — I10 ESSENTIAL HYPERTENSION: ICD-10-CM

## 2024-08-12 RX ORDER — INDAPAMIDE 2.5 MG/1
TABLET ORAL
Qty: 90 TABLET | Refills: 0 | Status: SHIPPED | OUTPATIENT
Start: 2024-08-12

## 2024-08-12 NOTE — TELEPHONE ENCOUNTER
Rx Refill Note  Requested Prescriptions     Pending Prescriptions Disp Refills    indapamide (LOZOL) 2.5 MG tablet [Pharmacy Med Name: Indapamide 2.5 MG Oral Tablet] 90 tablet 0     Sig: Take 1 tablet by mouth once daily      Last office visit with prescribing clinician: Visit date not found   Last telemedicine visit with prescribing clinician: Visit date not found   Next office visit with prescribing clinician: Visit date not found                         Would you like a call back once the refill request has been completed: [] Yes [] No    If the office needs to give you a call back, can they leave a voicemail: [] Yes [] No    Shruti Goins, PCT  08/12/24, 08:11 CDT

## 2024-08-31 DIAGNOSIS — E78.2 MIXED HYPERLIPIDEMIA: ICD-10-CM

## 2024-09-03 RX ORDER — SIMVASTATIN 40 MG
TABLET ORAL
Qty: 45 TABLET | Refills: 3 | Status: SHIPPED | OUTPATIENT
Start: 2024-09-03

## 2024-09-03 NOTE — TELEPHONE ENCOUNTER
Rx Refill Note  Requested Prescriptions     Pending Prescriptions Disp Refills    simvastatin (ZOCOR) 40 MG tablet [Pharmacy Med Name: Simvastatin 40 MG Oral Tablet] 45 tablet 0     Sig: TAKE 1/2 (ONE-HALF) TABLET BY MOUTH IN THE EVENING      Last office visit with prescribing clinician: 7/3/2024   Last telemedicine visit with prescribing clinician: Visit date not found   Next office visit with prescribing clinician: 12/4/2024                         Would you like a call back once the refill request has been completed: [] Yes [] No    If the office needs to give you a call back, can they leave a voicemail: [] Yes [] No    La Nena Avalos MA  09/03/24, 14:11 CDT

## 2024-09-25 ENCOUNTER — OFFICE VISIT (OUTPATIENT)
Dept: FAMILY MEDICINE CLINIC | Facility: CLINIC | Age: 84
End: 2024-09-25
Payer: MEDICARE

## 2024-09-25 VITALS
RESPIRATION RATE: 18 BRPM | BODY MASS INDEX: 37.21 KG/M2 | HEART RATE: 70 BPM | OXYGEN SATURATION: 98 % | WEIGHT: 210 LBS | TEMPERATURE: 97 F | DIASTOLIC BLOOD PRESSURE: 68 MMHG | HEIGHT: 63 IN | SYSTOLIC BLOOD PRESSURE: 122 MMHG

## 2024-09-25 DIAGNOSIS — R05.1 ACUTE COUGH: Primary | ICD-10-CM

## 2024-09-25 DIAGNOSIS — J01.00 SUBACUTE MAXILLARY SINUSITIS: ICD-10-CM

## 2024-09-25 LAB
EXPIRATION DATE: NORMAL
FLUAV AG UPPER RESP QL IA.RAPID: NOT DETECTED
FLUBV AG UPPER RESP QL IA.RAPID: NOT DETECTED
INTERNAL CONTROL: NORMAL
Lab: NORMAL
SARS-COV-2 AG UPPER RESP QL IA.RAPID: NOT DETECTED

## 2024-09-25 PROCEDURE — 3078F DIAST BP <80 MM HG: CPT | Performed by: FAMILY MEDICINE

## 2024-09-25 PROCEDURE — 99213 OFFICE O/P EST LOW 20 MIN: CPT | Performed by: FAMILY MEDICINE

## 2024-09-25 PROCEDURE — 3074F SYST BP LT 130 MM HG: CPT | Performed by: FAMILY MEDICINE

## 2024-09-25 PROCEDURE — 87428 SARSCOV & INF VIR A&B AG IA: CPT | Performed by: FAMILY MEDICINE

## 2024-09-25 PROCEDURE — 1126F AMNT PAIN NOTED NONE PRSNT: CPT | Performed by: FAMILY MEDICINE

## 2024-09-25 RX ORDER — TRIAMCINOLONE ACETONIDE 40 MG/ML
40 INJECTION, SUSPENSION INTRA-ARTICULAR; INTRAMUSCULAR ONCE
Status: SHIPPED | OUTPATIENT
Start: 2024-09-25

## 2024-09-25 RX ADMIN — TRIAMCINOLONE ACETONIDE 40 MG: 40 INJECTION, SUSPENSION INTRA-ARTICULAR; INTRAMUSCULAR at 11:53

## 2024-10-01 ENCOUNTER — OFFICE VISIT (OUTPATIENT)
Dept: FAMILY MEDICINE CLINIC | Facility: CLINIC | Age: 84
End: 2024-10-01
Payer: MEDICARE

## 2024-10-01 VITALS
WEIGHT: 208.8 LBS | TEMPERATURE: 98.6 F | OXYGEN SATURATION: 96 % | SYSTOLIC BLOOD PRESSURE: 124 MMHG | HEART RATE: 81 BPM | BODY MASS INDEX: 37 KG/M2 | HEIGHT: 63 IN | RESPIRATION RATE: 18 BRPM | DIASTOLIC BLOOD PRESSURE: 82 MMHG

## 2024-10-01 DIAGNOSIS — G89.29 CHRONIC INTRACTABLE HEADACHE, UNSPECIFIED HEADACHE TYPE: Primary | ICD-10-CM

## 2024-10-01 DIAGNOSIS — R53.83 OTHER FATIGUE: ICD-10-CM

## 2024-10-01 DIAGNOSIS — R51.9 CHRONIC INTRACTABLE HEADACHE, UNSPECIFIED HEADACHE TYPE: Primary | ICD-10-CM

## 2024-10-01 PROCEDURE — 96372 THER/PROPH/DIAG INJ SC/IM: CPT | Performed by: FAMILY MEDICINE

## 2024-10-01 PROCEDURE — 3074F SYST BP LT 130 MM HG: CPT | Performed by: FAMILY MEDICINE

## 2024-10-01 PROCEDURE — 1160F RVW MEDS BY RX/DR IN RCRD: CPT | Performed by: FAMILY MEDICINE

## 2024-10-01 PROCEDURE — 1125F AMNT PAIN NOTED PAIN PRSNT: CPT | Performed by: FAMILY MEDICINE

## 2024-10-01 PROCEDURE — 1159F MED LIST DOCD IN RCRD: CPT | Performed by: FAMILY MEDICINE

## 2024-10-01 PROCEDURE — 3079F DIAST BP 80-89 MM HG: CPT | Performed by: FAMILY MEDICINE

## 2024-10-01 PROCEDURE — 99213 OFFICE O/P EST LOW 20 MIN: CPT | Performed by: FAMILY MEDICINE

## 2024-10-01 RX ORDER — PREDNISONE 20 MG/1
TABLET ORAL
Qty: 10 TABLET | Refills: 0 | Status: SHIPPED | OUTPATIENT
Start: 2024-10-01

## 2024-10-01 RX ORDER — TRIAMCINOLONE ACETONIDE 40 MG/ML
40 INJECTION, SUSPENSION INTRA-ARTICULAR; INTRAMUSCULAR ONCE
Status: COMPLETED | OUTPATIENT
Start: 2024-10-01 | End: 2024-10-01

## 2024-10-01 RX ADMIN — TRIAMCINOLONE ACETONIDE 40 MG: 40 INJECTION, SUSPENSION INTRA-ARTICULAR; INTRAMUSCULAR at 16:11

## 2024-10-01 NOTE — PROGRESS NOTES
"Markie Dickerson is a 84 y.o. female.     History of Present Illness  84-year-old with persistent headache over the last 10 to 14 days and increasing breathlessness      The following portions of the patient's history were reviewed and updated as appropriate: allergies, current medications, past family history, past medical history, past social history, past surgical history, and problem list.    Review of Systems   Respiratory:  Positive for shortness of breath.    Cardiovascular:  Negative for chest pain and leg swelling.   Musculoskeletal:  Positive for neck pain.   Neurological:  Positive for headaches.        Headache distribution over typical muscle contraction headache  \"Tylenol heat help\"       Objective   Physical Exam  Vitals and nursing note reviewed.   Constitutional:       Appearance: She is obese.   HENT:      Right Ear: Tympanic membrane and ear canal normal.      Left Ear: Tympanic membrane and ear canal normal.      Mouth/Throat:      Mouth: Mucous membranes are moist.      Pharynx: Oropharynx is clear.   Eyes:      Extraocular Movements: Extraocular movements intact.      Pupils: Pupils are equal, round, and reactive to light.   Cardiovascular:      Rate and Rhythm: Normal rate and regular rhythm.   Pulmonary:      Effort: Pulmonary effort is normal.      Breath sounds: Normal breath sounds.   Musculoskeletal:      Cervical back: Rigidity and tenderness present.      Right lower leg: No edema.      Left lower leg: No edema.   Skin:     General: Skin is warm and dry.   Neurological:      General: No focal deficit present.      Mental Status: She is oriented to person, place, and time.   Psychiatric:         Mood and Affect: Mood normal.         Behavior: Behavior normal.         Thought Content: Thought content normal.         Judgment: Judgment normal.         Assessment & Plan   Diagnoses and all orders for this visit:    1. Chronic intractable headache, unspecified headache type " (Primary)  -     triamcinolone acetonide (KENALOG-40) injection 40 mg    2. Other fatigue  -     Ambulatory Referral to Cardiology    Other orders  -     predniSONE (DELTASONE) 20 MG tablet; Starting Wednesday morning take 2 pills on Wednesday Thursday Friday then 1 pill till medicine gone  Dispense: 10 tablet; Refill: 0       Plan urgent cardiology visit follow-up from last year-steroid trial for neck return Thursday week

## 2024-10-10 ENCOUNTER — PRIOR AUTHORIZATION (OUTPATIENT)
Dept: FAMILY MEDICINE CLINIC | Facility: CLINIC | Age: 84
End: 2024-10-10
Payer: MEDICARE

## 2024-10-10 ENCOUNTER — OFFICE VISIT (OUTPATIENT)
Dept: FAMILY MEDICINE CLINIC | Facility: CLINIC | Age: 84
End: 2024-10-10
Payer: MEDICARE

## 2024-10-10 VITALS
HEIGHT: 63 IN | DIASTOLIC BLOOD PRESSURE: 70 MMHG | OXYGEN SATURATION: 98 % | HEART RATE: 86 BPM | TEMPERATURE: 96.9 F | SYSTOLIC BLOOD PRESSURE: 120 MMHG | RESPIRATION RATE: 18 BRPM | WEIGHT: 208 LBS | BODY MASS INDEX: 36.86 KG/M2

## 2024-10-10 DIAGNOSIS — M54.2 NECK PAIN: Primary | ICD-10-CM

## 2024-10-10 PROCEDURE — 99213 OFFICE O/P EST LOW 20 MIN: CPT | Performed by: FAMILY MEDICINE

## 2024-10-10 PROCEDURE — 1160F RVW MEDS BY RX/DR IN RCRD: CPT | Performed by: FAMILY MEDICINE

## 2024-10-10 PROCEDURE — 3074F SYST BP LT 130 MM HG: CPT | Performed by: FAMILY MEDICINE

## 2024-10-10 PROCEDURE — 3078F DIAST BP <80 MM HG: CPT | Performed by: FAMILY MEDICINE

## 2024-10-10 PROCEDURE — 1159F MED LIST DOCD IN RCRD: CPT | Performed by: FAMILY MEDICINE

## 2024-10-10 PROCEDURE — 1125F AMNT PAIN NOTED PAIN PRSNT: CPT | Performed by: FAMILY MEDICINE

## 2024-10-10 RX ORDER — CHLORZOXAZONE 250 MG/1
250 TABLET ORAL 4 TIMES DAILY PRN
Qty: 40 TABLET | Refills: 2 | Status: SHIPPED | OUTPATIENT
Start: 2024-10-10

## 2024-10-10 NOTE — PROGRESS NOTES
Markie Dickerson is a 84 y.o. female.     History of Present Illness  84-year-old female complaining of neck pain and headaches-positive response to steroids for headache      The following portions of the patient's history were reviewed and updated as appropriate: allergies, current medications, past family history, past medical history, past social history, past surgical history, and problem list.    Review of Systems   Respiratory:  Negative for shortness of breath.    Cardiovascular:  Negative for chest pain.        Cardiology tomorrow   Musculoskeletal:  Positive for arthralgias and neck pain.   Neurological:  Positive for headaches.       Objective   Physical Exam  Vitals and nursing note reviewed.   Constitutional:       Appearance: Normal appearance.   HENT:      Right Ear: Tympanic membrane and ear canal normal.      Left Ear: Tympanic membrane and ear canal normal.      Mouth/Throat:      Mouth: Mucous membranes are moist.      Pharynx: Oropharynx is clear.   Cardiovascular:      Rate and Rhythm: Normal rate and regular rhythm.   Pulmonary:      Effort: Pulmonary effort is normal.      Breath sounds: Normal breath sounds.   Musculoskeletal:      Cervical back: Rigidity and tenderness present.      Right lower leg: No edema.      Left lower leg: No edema.   Skin:     General: Skin is warm and dry.   Neurological:      General: No focal deficit present.      Mental Status: She is alert and oriented to person, place, and time.   Psychiatric:         Mood and Affect: Mood normal.         Behavior: Behavior normal.         Thought Content: Thought content normal.         Judgment: Judgment normal.       Assessment & Plan   Diagnoses and all orders for this visit:    1. Neck pain (Primary)  -     XR Spine Cervical Complete 4 or 5 View; Future    Other orders  -     chlorzoxazone (PARAFON FORTE) 250 MG tablet; Take 1 tablet by mouth 4 (Four) Times a Day As Needed for Muscle Spasms.  Dispense: 40  tablet; Refill: 2

## 2024-10-10 NOTE — TELEPHONE ENCOUNTER
Prior auth submitted through cover my meds for chlorzoxazone.    Approval received and faxed to pharmacy.

## 2024-10-15 DIAGNOSIS — M54.2 NECK PAIN: Primary | ICD-10-CM

## 2024-10-29 DIAGNOSIS — M85.89 OTHER SPECIFIED DISORDERS OF BONE DENSITY AND STRUCTURE, MULTIPLE SITES: Primary | ICD-10-CM

## 2024-11-03 DIAGNOSIS — I10 ESSENTIAL HYPERTENSION: ICD-10-CM

## 2024-11-04 ENCOUNTER — HOSPITAL ENCOUNTER (OUTPATIENT)
Dept: ULTRASOUND IMAGING | Age: 84
Discharge: HOME | End: 2024-11-04
Payer: MEDICARE

## 2024-11-04 DIAGNOSIS — M85.89 OTHER SPECIFIED DISORDERS OF BONE DENSITY AND STRUCTURE, MULTIPLE SITES: ICD-10-CM

## 2024-11-04 PROCEDURE — 77080 DXA BONE DENSITY AXIAL: CPT

## 2024-11-04 RX ORDER — INDAPAMIDE 2.5 MG/1
TABLET ORAL
Qty: 90 TABLET | Refills: 2 | Status: SHIPPED | OUTPATIENT
Start: 2024-11-04

## 2024-11-04 NOTE — TELEPHONE ENCOUNTER
Rx Refill Note  Requested Prescriptions     Pending Prescriptions Disp Refills    indapamide (LOZOL) 2.5 MG tablet [Pharmacy Med Name: Indapamide 2.5 MG Oral Tablet] 90 tablet 0     Sig: Take 1 tablet by mouth once daily      Last office visit with prescribing clinician: 10/10/24  Last telemedicine visit with prescribing clinician: Visit date not found   Next office visit with prescribing clinician: 12/4/24    {TIP  Please add Last Relevant Lab 5/21/24    La Nena Ha MA  11/04/24, 09:16 CST

## 2024-11-06 ENCOUNTER — OFFICE VISIT (OUTPATIENT)
Age: 84
End: 2024-11-06
Payer: MEDICARE

## 2024-11-06 VITALS
TEMPERATURE: 97.8 F | DIASTOLIC BLOOD PRESSURE: 78 MMHG | HEIGHT: 62 IN | RESPIRATION RATE: 18 BRPM | SYSTOLIC BLOOD PRESSURE: 130 MMHG | OXYGEN SATURATION: 99 % | HEART RATE: 68 BPM | WEIGHT: 209 LBS | BODY MASS INDEX: 38.46 KG/M2

## 2024-11-06 DIAGNOSIS — M85.852 OSTEOPENIA OF NECK OF LEFT FEMUR: Primary | ICD-10-CM

## 2024-11-06 PROCEDURE — 99203 OFFICE O/P NEW LOW 30 MIN: CPT | Performed by: FAMILY MEDICINE

## 2024-11-06 PROCEDURE — 1123F ACP DISCUSS/DSCN MKR DOCD: CPT | Performed by: FAMILY MEDICINE

## 2024-11-06 ASSESSMENT — ENCOUNTER SYMPTOMS
ABDOMINAL PAIN: 0
CHEST TIGHTNESS: 0
BLOOD IN STOOL: 0
WHEEZING: 0
SHORTNESS OF BREATH: 0

## 2024-11-06 NOTE — PROGRESS NOTES
Ana María Taylor (:  1940) is a 84 y.o. female,New patient, here for evaluation of the following chief complaint(s):  Follow-up (Pt here for 1 year f/u for osteo clinic/Pt takes 2000 IU Vit D and Multi Vit w/ Calcium)      Assessment & Plan   ASSESSMENT/PLAN:  1. Osteopenia of neck of left femur      I reviewed patient's DEXA scan with her she has a current T-score of -1.1 which is improved over last year.  Will maintain current therapy.  Patient is getting exercise and would encourage at least 150 minutes of weightbearing exercise weekly.  Continue vitamin D and calcium supplementation        Return in about 1 year (around 2025).           Subjective   SUBJECTIVE/OBJECTIVE:  Ana María Taylor is a 84 y.o. female who presents for annual follow-up.  Patient has a history of borderline osteoporosis.  She has been taking calcium and vitamin D daily and takes approximately 2000 units of vitamin D though is unsure how many milligrams of calcium she gets.  She has not had any falls or bone pain.  She has been off of Fosamax now for around 2 to 3 years        Review of Systems   Constitutional:  Negative for activity change and fever.   HENT:  Negative for congestion.    Eyes:  Negative for visual disturbance.   Respiratory:  Negative for chest tightness, shortness of breath and wheezing.    Cardiovascular:  Negative for chest pain.   Gastrointestinal:  Negative for abdominal pain and blood in stool.   Genitourinary:  Negative for difficulty urinating and urgency.   Neurological:  Negative for weakness and headaches.   Psychiatric/Behavioral:  Negative for confusion.           Objective   Physical Exam  Vitals reviewed.   Constitutional:       General: She is not in acute distress.     Appearance: Normal appearance. She is not ill-appearing.   HENT:      Head: Normocephalic and atraumatic.   Cardiovascular:      Rate and Rhythm: Normal rate and regular rhythm.      Pulses: Normal pulses.      Heart sounds: Normal

## 2024-11-12 ENCOUNTER — FLU SHOT (OUTPATIENT)
Dept: FAMILY MEDICINE CLINIC | Facility: CLINIC | Age: 84
End: 2024-11-12
Payer: MEDICARE

## 2024-11-12 DIAGNOSIS — Z23 NEED FOR INFLUENZA VACCINATION: Primary | ICD-10-CM

## 2024-11-12 PROCEDURE — G0008 ADMIN INFLUENZA VIRUS VAC: HCPCS | Performed by: FAMILY MEDICINE

## 2024-11-12 PROCEDURE — 90662 IIV NO PRSV INCREASED AG IM: CPT | Performed by: FAMILY MEDICINE

## 2024-11-26 RX ORDER — FAMOTIDINE 20 MG/1
TABLET, FILM COATED ORAL
Qty: 180 TABLET | Refills: 3 | Status: SHIPPED | OUTPATIENT
Start: 2024-11-26

## 2024-11-26 NOTE — TELEPHONE ENCOUNTER
Rx Refill Note  Requested Prescriptions     Pending Prescriptions Disp Refills    famotidine (PEPCID) 20 MG tablet [Pharmacy Med Name: Famotidine 20 MG Oral Tablet] 180 tablet 0     Sig: TAKE 2 TABLETS BY MOUTH AT BEDTIME      Last office visit with prescribing clinician: 10/10/2024   Last telemedicine visit with prescribing clinician: Visit date not found   Next office visit with prescribing clinician: 12/4/2024     La Nena Ha MA  11/26/24, 07:23 CST

## 2024-12-04 ENCOUNTER — OFFICE VISIT (OUTPATIENT)
Dept: FAMILY MEDICINE CLINIC | Facility: CLINIC | Age: 84
End: 2024-12-04
Payer: MEDICARE

## 2024-12-04 VITALS
RESPIRATION RATE: 18 BRPM | TEMPERATURE: 98.1 F | DIASTOLIC BLOOD PRESSURE: 78 MMHG | HEART RATE: 78 BPM | OXYGEN SATURATION: 98 % | SYSTOLIC BLOOD PRESSURE: 124 MMHG | HEIGHT: 63 IN | BODY MASS INDEX: 36.86 KG/M2 | WEIGHT: 208 LBS

## 2024-12-04 DIAGNOSIS — R53.83 OTHER FATIGUE: ICD-10-CM

## 2024-12-04 DIAGNOSIS — E78.2 MIXED HYPERLIPIDEMIA: ICD-10-CM

## 2024-12-04 DIAGNOSIS — Z23 NEED FOR COVID-19 VACCINE: ICD-10-CM

## 2024-12-04 DIAGNOSIS — I10 ESSENTIAL HYPERTENSION: Primary | ICD-10-CM

## 2024-12-04 NOTE — PROGRESS NOTES
Markie Dickerson is a 84 y.o. female.     History of Present Illness  84-year-old female follow-up for hyperlipidemia etc.      The following portions of the patient's history were reviewed and updated as appropriate: allergies, current medications, past family history, past medical history, past social history, past surgical history, and problem list.    Review of Systems   Respiratory:  Negative for shortness of breath.    Cardiovascular:  Negative for chest pain and leg swelling.   Musculoskeletal:  Positive for arthralgias, back pain and neck pain.   Neurological:  Positive for headaches.        Headaches in the past-Fiorinal helped       Objective   Physical Exam  Vitals and nursing note reviewed.   Constitutional:       Appearance: She is obese.   Eyes:      Extraocular Movements: Extraocular movements intact.      Pupils: Pupils are equal, round, and reactive to light.   Cardiovascular:      Rate and Rhythm: Normal rate and regular rhythm.   Pulmonary:      Effort: Pulmonary effort is normal.      Breath sounds: Normal breath sounds.   Musculoskeletal:      Cervical back: Tenderness present.      Right lower leg: No edema.      Left lower leg: No edema.   Skin:     General: Skin is warm and dry.   Neurological:      General: No focal deficit present.      Mental Status: She is alert and oriented to person, place, and time.   Psychiatric:         Mood and Affect: Mood normal.         Behavior: Behavior normal.         Thought Content: Thought content normal.         Judgment: Judgment normal.         Assessment & Plan   Diagnoses and all orders for this visit:    1. Essential hypertension (Primary)  -     POC Urinalysis Dipstick, Multipro    2. Other fatigue  -     CBC & Differential    3. Mixed hyperlipidemia  -     Comprehensive Metabolic Panel  -     Lipid Panel       Plan above-advised COVID shot

## 2024-12-05 LAB
ALBUMIN SERPL-MCNC: 4.3 G/DL (ref 3.5–5.2)
ALBUMIN/GLOB SERPL: 1.7 G/DL
ALP SERPL-CCNC: 55 U/L (ref 39–117)
ALT SERPL-CCNC: 12 U/L (ref 1–33)
AST SERPL-CCNC: 15 U/L (ref 1–32)
BASOPHILS # BLD AUTO: 0.07 10*3/MM3 (ref 0–0.2)
BASOPHILS NFR BLD AUTO: 1.1 % (ref 0–1.5)
BILIRUB SERPL-MCNC: 0.3 MG/DL (ref 0–1.2)
BUN SERPL-MCNC: 35 MG/DL (ref 8–23)
BUN/CREAT SERPL: 26.7 (ref 7–25)
CALCIUM SERPL-MCNC: 9.1 MG/DL (ref 8.6–10.5)
CHLORIDE SERPL-SCNC: 98 MMOL/L (ref 98–107)
CHOLEST SERPL-MCNC: 174 MG/DL (ref 0–200)
CO2 SERPL-SCNC: 24.4 MMOL/L (ref 22–29)
CREAT SERPL-MCNC: 1.31 MG/DL (ref 0.57–1)
EGFRCR SERPLBLD CKD-EPI 2021: 40 ML/MIN/1.73
EOSINOPHIL # BLD AUTO: 0.14 10*3/MM3 (ref 0–0.4)
EOSINOPHIL NFR BLD AUTO: 2.3 % (ref 0.3–6.2)
ERYTHROCYTE [DISTWIDTH] IN BLOOD BY AUTOMATED COUNT: 12.1 % (ref 12.3–15.4)
GLOBULIN SER CALC-MCNC: 2.5 GM/DL
GLUCOSE SERPL-MCNC: 88 MG/DL (ref 65–99)
HCT VFR BLD AUTO: 38.6 % (ref 34–46.6)
HDLC SERPL-MCNC: 54 MG/DL (ref 40–60)
HGB BLD-MCNC: 13.3 G/DL (ref 12–15.9)
IMM GRANULOCYTES # BLD AUTO: 0.02 10*3/MM3 (ref 0–0.05)
IMM GRANULOCYTES NFR BLD AUTO: 0.3 % (ref 0–0.5)
LDLC SERPL CALC-MCNC: 98 MG/DL (ref 0–100)
LYMPHOCYTES # BLD AUTO: 1.71 10*3/MM3 (ref 0.7–3.1)
LYMPHOCYTES NFR BLD AUTO: 28 % (ref 19.6–45.3)
MCH RBC QN AUTO: 33.6 PG (ref 26.6–33)
MCHC RBC AUTO-ENTMCNC: 34.5 G/DL (ref 31.5–35.7)
MCV RBC AUTO: 97.5 FL (ref 79–97)
MONOCYTES # BLD AUTO: 0.64 10*3/MM3 (ref 0.1–0.9)
MONOCYTES NFR BLD AUTO: 10.5 % (ref 5–12)
NEUTROPHILS # BLD AUTO: 3.52 10*3/MM3 (ref 1.7–7)
NEUTROPHILS NFR BLD AUTO: 57.8 % (ref 42.7–76)
NRBC BLD AUTO-RTO: 0 /100 WBC (ref 0–0.2)
PLATELET # BLD AUTO: 303 10*3/MM3 (ref 140–450)
POTASSIUM SERPL-SCNC: 4.5 MMOL/L (ref 3.5–5.2)
PROT SERPL-MCNC: 6.8 G/DL (ref 6–8.5)
RBC # BLD AUTO: 3.96 10*6/MM3 (ref 3.77–5.28)
SODIUM SERPL-SCNC: 138 MMOL/L (ref 136–145)
TRIGL SERPL-MCNC: 125 MG/DL (ref 0–150)
VLDLC SERPL CALC-MCNC: 22 MG/DL (ref 5–40)
WBC # BLD AUTO: 6.1 10*3/MM3 (ref 3.4–10.8)

## 2025-01-03 ENCOUNTER — OFFICE VISIT (OUTPATIENT)
Dept: FAMILY MEDICINE CLINIC | Facility: CLINIC | Age: 85
End: 2025-01-03
Payer: MEDICARE

## 2025-01-03 VITALS
HEIGHT: 63 IN | TEMPERATURE: 98.4 F | OXYGEN SATURATION: 96 % | BODY MASS INDEX: 36.93 KG/M2 | WEIGHT: 208.4 LBS | HEART RATE: 79 BPM | SYSTOLIC BLOOD PRESSURE: 118 MMHG | DIASTOLIC BLOOD PRESSURE: 76 MMHG

## 2025-01-03 DIAGNOSIS — J02.9 SORE THROAT: ICD-10-CM

## 2025-01-03 DIAGNOSIS — J00 ACUTE NASOPHARYNGITIS: Primary | ICD-10-CM

## 2025-01-03 DIAGNOSIS — R05.1 ACUTE COUGH: ICD-10-CM

## 2025-01-03 DIAGNOSIS — R09.81 NASAL CONGESTION: ICD-10-CM

## 2025-01-03 PROCEDURE — 99213 OFFICE O/P EST LOW 20 MIN: CPT | Performed by: NURSE PRACTITIONER

## 2025-01-03 PROCEDURE — 87428 SARSCOV & INF VIR A&B AG IA: CPT | Performed by: NURSE PRACTITIONER

## 2025-01-03 PROCEDURE — 3074F SYST BP LT 130 MM HG: CPT | Performed by: NURSE PRACTITIONER

## 2025-01-03 PROCEDURE — 3078F DIAST BP <80 MM HG: CPT | Performed by: NURSE PRACTITIONER

## 2025-01-03 PROCEDURE — 1126F AMNT PAIN NOTED NONE PRSNT: CPT | Performed by: NURSE PRACTITIONER

## 2025-01-03 RX ORDER — FLUTICASONE PROPIONATE 50 MCG
2 SPRAY, SUSPENSION (ML) NASAL DAILY
Qty: 16 G | Refills: 0 | Status: SHIPPED | OUTPATIENT
Start: 2025-01-03

## 2025-01-03 RX ORDER — CETIRIZINE HYDROCHLORIDE 10 MG/1
10 TABLET ORAL DAILY
Qty: 30 TABLET | Refills: 1 | Status: SHIPPED | OUTPATIENT
Start: 2025-01-03 | End: 2025-02-02

## 2025-01-03 NOTE — PROGRESS NOTES
CC:   Chief Complaint   Patient presents with    Hoarse     Symptoms started on Wednedsay    Cough    Nasal Congestion    Sore Throat        History:  Hailey Dickerson is a 84 y.o. female who presents today for evaluation of the above problems.      HPI  Patient presents for illness.  Reports symptoms started 2 days ago with nasal congestion, sore throat and hoarseness.  Only has cough at night due to postnasal drainage.  Afebrile.  Denies any difficulty breathing.  States her hoarseness and sore throat have improved some today.    Allergies   Allergen Reactions    Lortab [Hydrocodone-Acetaminophen] Nausea And Vomiting    Ciprocinonide [Fluocinolone] Rash    Sulfa Antibiotics Rash     Past Medical History:   Diagnosis Date    Anxiety     Arthritis     GERD (gastroesophageal reflux disease)     Hypertension      Past Surgical History:   Procedure Laterality Date    ANKLE SURGERY      CHOLECYSTECTOMY      DILATATION AND CURETTAGE      TOTAL KNEE ARTHROPLASTY Left 11/6/2019    Procedure: TOTAL KNEE REPLACEMENT;  Surgeon: Rashaad Capps MD;  Location: [facility];  Service: Orthopedics     Family History   Problem Relation Age of Onset    Heart attack Mother     Cancer Father         Lung    No Known Problems Maternal Grandmother     No Known Problems Maternal Grandfather     No Known Problems Paternal Grandmother     No Known Problems Paternal Grandfather       reports that she has never smoked. She has never used smokeless tobacco. She reports that she does not drink alcohol and does not use drugs.      Current Outpatient Medications:     aspirin 81 MG EC tablet, Take 1 tablet by mouth Daily., Disp: 30 tablet, Rfl: 0    butalbital-aspirin-caffeine (FIORINAL) -40 MG per capsule, Take 1 capsule by mouth Every 6 (Six) Hours As Needed for Headache., Disp: 25 capsule, Rfl: 0    cetirizine (zyrTEC) 10 MG tablet, Take 1 tablet by mouth Daily for 30 days., Disp: 30 tablet, Rfl: 1    Cholecalciferol (Vitamin D) 50 MCG  "(2000 UT) capsule, Take  by mouth Daily., Disp: , Rfl:     citalopram (CeleXA) 20 MG tablet, Take 1 tablet by mouth once daily, Disp: 90 tablet, Rfl: 3    Cranberry 125 MG tablet, Take 1 tablet by mouth Daily., Disp: , Rfl:     famotidine (PEPCID) 20 MG tablet, TAKE 2 TABLETS BY MOUTH AT BEDTIME, Disp: 180 tablet, Rfl: 3    FIBER PO, Take  by mouth Daily., Disp: , Rfl:     indapamide (LOZOL) 2.5 MG tablet, Take 1 tablet by mouth once daily, Disp: 90 tablet, Rfl: 2    lisinopril (PRINIVIL,ZESTRIL) 10 MG tablet, Take 1 tablet by mouth once daily, Disp: 90 tablet, Rfl: 3    Multiple Vitamins-Minerals (MULTIVITAMIN WITH MINERALS) tablet tablet, Take 1 tablet by mouth Daily., Disp: , Rfl:     Probiotic Product (PROBIOTIC-10 PO), Take  by mouth Daily., Disp: , Rfl:     simvastatin (ZOCOR) 40 MG tablet, TAKE 1/2 (ONE-HALF) TABLET BY MOUTH IN THE EVENING, Disp: 45 tablet, Rfl: 3    chlorzoxazone (PARAFON FORTE) 250 MG tablet, Take 1 tablet by mouth 4 (Four) Times a Day As Needed for Muscle Spasms. (Patient not taking: Reported on 1/3/2025), Disp: 40 tablet, Rfl: 2    fluticasone (FLONASE) 50 MCG/ACT nasal spray, Administer 2 sprays into the nostril(s) as directed by provider Daily., Disp: 16 g, Rfl: 0    OBJECTIVE:  /76 (BP Location: Left arm, Patient Position: Sitting, Cuff Size: Large Adult)   Pulse 79   Temp 98.4 °F (36.9 °C) (Temporal)   Ht 160 cm (63\")   Wt 94.5 kg (208 lb 6.4 oz)   LMP  (LMP Unknown)   SpO2 96%   BMI 36.92 kg/m²    Estimated body mass index is 36.92 kg/m² as calculated from the following:    Height as of this encounter: 160 cm (63\").    Weight as of this encounter: 94.5 kg (208 lb 6.4 oz).                  Physical Exam  Vitals reviewed.   Constitutional:       General: She is not in acute distress.     Appearance: Normal appearance.   HENT:      Head: Normocephalic and atraumatic.      Nose:      Right Sinus: No maxillary sinus tenderness or frontal sinus tenderness.      Left Sinus: No " maxillary sinus tenderness or frontal sinus tenderness.      Mouth/Throat:      Pharynx: Oropharynx is clear.   Cardiovascular:      Rate and Rhythm: Normal rate and regular rhythm.   Pulmonary:      Effort: No respiratory distress.      Breath sounds: Normal breath sounds. No wheezing.   Lymphadenopathy:      Cervical: No cervical adenopathy.   Skin:     General: Skin is warm and dry.   Neurological:      Mental Status: She is alert and oriented to person, place, and time.   Psychiatric:         Mood and Affect: Mood normal.         Behavior: Behavior normal.              Assessment/Plan    Diagnoses and all orders for this visit:    1. Acute nasopharyngitis (Primary)  -     cetirizine (zyrTEC) 10 MG tablet; Take 1 tablet by mouth Daily for 30 days.  Dispense: 30 tablet; Refill: 1  -     fluticasone (FLONASE) 50 MCG/ACT nasal spray; Administer 2 sprays into the nostril(s) as directed by provider Daily.  Dispense: 16 g; Refill: 0    Negative for flu and COVID.  Discussed common cold and viral nature.  Will treat symptoms with antihistamine and nasal steroid spray.  Encouraged increased hydration.  If any fever or worsening symptoms in the next 2 to 3 days-patient to call and I will send her in an antibiotic.    2. Acute cough  -     POCT SARS-CoV-2 Antigen MARIAN + Flu    3. Nasal congestion  -     POCT SARS-CoV-2 Antigen MARIAN + Flu    4. Sore throat  -     POCT SARS-CoV-2 Antigen MARIAN + Flu                An After Visit Summary was printed and given to the patient at discharge.  Return if symptoms worsen or fail to improve.

## 2025-01-06 ENCOUNTER — TELEPHONE (OUTPATIENT)
Dept: FAMILY MEDICINE CLINIC | Facility: CLINIC | Age: 85
End: 2025-01-06
Payer: MEDICARE

## 2025-01-06 RX ORDER — AZITHROMYCIN 250 MG/1
TABLET, FILM COATED ORAL
Qty: 6 TABLET | Refills: 0 | Status: SHIPPED | OUTPATIENT
Start: 2025-01-06

## 2025-01-06 NOTE — TELEPHONE ENCOUNTER
Pt calling and was advised if not feeling better to call office today--says she is not any better.  Awful cough and hoarseness.  Unknown fever.  Wanting meds called.in.    [facility]

## 2025-01-22 ENCOUNTER — OFFICE VISIT (OUTPATIENT)
Dept: FAMILY MEDICINE CLINIC | Facility: CLINIC | Age: 85
End: 2025-01-22
Payer: MEDICARE

## 2025-01-22 VITALS
SYSTOLIC BLOOD PRESSURE: 122 MMHG | BODY MASS INDEX: 37.49 KG/M2 | TEMPERATURE: 98.2 F | DIASTOLIC BLOOD PRESSURE: 80 MMHG | HEIGHT: 63 IN | OXYGEN SATURATION: 97 % | WEIGHT: 211.6 LBS | HEART RATE: 104 BPM | RESPIRATION RATE: 18 BRPM

## 2025-01-22 DIAGNOSIS — R05.2 SUBACUTE COUGH: Primary | ICD-10-CM

## 2025-01-22 RX ORDER — TRIAMCINOLONE ACETONIDE 40 MG/ML
40 INJECTION, SUSPENSION INTRA-ARTICULAR; INTRAMUSCULAR ONCE
Status: COMPLETED | OUTPATIENT
Start: 2025-01-22 | End: 2025-01-22

## 2025-01-22 RX ORDER — DOXYCYCLINE 100 MG/1
100 TABLET ORAL 2 TIMES DAILY
Qty: 20 TABLET | Refills: 0 | Status: SHIPPED | OUTPATIENT
Start: 2025-01-22

## 2025-01-22 RX ADMIN — TRIAMCINOLONE ACETONIDE 40 MG: 40 INJECTION, SUSPENSION INTRA-ARTICULAR; INTRAMUSCULAR at 11:24

## 2025-01-22 NOTE — PROGRESS NOTES
Markie Dickerson is a 84 y.o. female.     History of Present Illness  84-year-old female with continued cough congestion      The following portions of the patient's history were reviewed and updated as appropriate: allergies, current medications, past family history, past medical history, past social history, past surgical history, and problem list.    Review of Systems   HENT:  Positive for postnasal drip.    Respiratory:  Positive for cough. Negative for shortness of breath.    Cardiovascular:  Negative for chest pain and leg swelling.       Objective   Physical Exam  Vitals and nursing note reviewed.   Constitutional:       Appearance: She is obese.   HENT:      Mouth/Throat:      Mouth: Mucous membranes are moist.      Pharynx: Oropharynx is clear.   Eyes:      Extraocular Movements: Extraocular movements intact.      Pupils: Pupils are equal, round, and reactive to light.   Cardiovascular:      Rate and Rhythm: Normal rate and regular rhythm.   Pulmonary:      Effort: Pulmonary effort is normal.      Breath sounds: Normal breath sounds. No wheezing or rhonchi.   Musculoskeletal:      Right lower leg: No edema.      Left lower leg: No edema.   Skin:     General: Skin is warm and dry.   Neurological:      General: No focal deficit present.      Mental Status: She is alert and oriented to person, place, and time.   Psychiatric:         Mood and Affect: Mood normal.         Behavior: Behavior normal.         Assessment & Plan   Diagnoses and all orders for this visit:    1. Subacute cough (Primary)  -     XR Chest PA & Lateral; Future  -     triamcinolone acetonide (KENALOG-40) injection 40 mg    Other orders  -     doxycycline (ADOXA) 100 MG tablet; Take 1 tablet by mouth 2 (Two) Times a Day.  Dispense: 20 tablet; Refill: 0         Plan-above-COVID flu negative

## 2025-02-27 DIAGNOSIS — J00 ACUTE NASOPHARYNGITIS: ICD-10-CM

## 2025-02-27 RX ORDER — CETIRIZINE HYDROCHLORIDE 10 MG/1
10 TABLET ORAL DAILY
Qty: 90 TABLET | Refills: 3 | Status: SHIPPED | OUTPATIENT
Start: 2025-02-27

## 2025-02-27 NOTE — TELEPHONE ENCOUNTER
Rx Refill Note  Requested Prescriptions     Pending Prescriptions Disp Refills    cetirizine (zyrTEC) 10 MG tablet [Pharmacy Med Name: Cetirizine HCl 10 MG Oral Tablet] 30 tablet 0     Sig: Take 1 tablet by mouth once daily      Last office visit with prescribing clinician: 1/22/25   Last telemedicine visit with prescribing clinician: Visit date not found   Next office visit with prescribing clinician: 5/27/25    La Nena Ha MA  02/27/25, 07:34 CST

## 2025-05-16 DIAGNOSIS — I10 ESSENTIAL HYPERTENSION: ICD-10-CM

## 2025-05-16 RX ORDER — LISINOPRIL 10 MG/1
10 TABLET ORAL DAILY
Qty: 90 TABLET | Refills: 0 | Status: SHIPPED | OUTPATIENT
Start: 2025-05-16

## 2025-05-16 NOTE — TELEPHONE ENCOUNTER
Rx Refill Note  Requested Prescriptions     Pending Prescriptions Disp Refills    lisinopril (PRINIVIL,ZESTRIL) 10 MG tablet [Pharmacy Med Name: Lisinopril 10 MG Oral Tablet] 90 tablet 0     Sig: Take 1 tablet by mouth once daily      Last office visit with prescribing clinician: 1/22/2025   Last telemedicine visit with prescribing clinician: Visit date not found   Next office visit with prescribing clinician: 5/27/2025   CPE done                       Would you like a call back once the refill request has been completed: [] Yes [] No    If the office needs to give you a call back, can they leave a voicemail: [] Yes [] No    La Nena Avalos MA  05/16/25, 15:49 CDT

## 2025-06-03 ENCOUNTER — TELEPHONE (OUTPATIENT)
Dept: FAMILY MEDICINE CLINIC | Facility: CLINIC | Age: 85
End: 2025-06-03

## 2025-06-03 ENCOUNTER — OFFICE VISIT (OUTPATIENT)
Dept: FAMILY MEDICINE CLINIC | Facility: CLINIC | Age: 85
End: 2025-06-03
Payer: MEDICARE

## 2025-06-03 VITALS
RESPIRATION RATE: 18 BRPM | WEIGHT: 214 LBS | SYSTOLIC BLOOD PRESSURE: 114 MMHG | HEIGHT: 63 IN | OXYGEN SATURATION: 99 % | HEART RATE: 97 BPM | TEMPERATURE: 98.5 F | DIASTOLIC BLOOD PRESSURE: 70 MMHG | BODY MASS INDEX: 37.92 KG/M2

## 2025-06-03 DIAGNOSIS — I10 ESSENTIAL HYPERTENSION: Primary | ICD-10-CM

## 2025-06-03 DIAGNOSIS — Z00.00 MEDICARE ANNUAL WELLNESS VISIT, SUBSEQUENT: ICD-10-CM

## 2025-06-03 DIAGNOSIS — R41.3 MEMORY LOSS: ICD-10-CM

## 2025-06-03 DIAGNOSIS — R10.2 PELVIC PAIN: ICD-10-CM

## 2025-06-03 DIAGNOSIS — Z12.31 SCREENING MAMMOGRAM FOR BREAST CANCER: ICD-10-CM

## 2025-06-03 DIAGNOSIS — R53.83 OTHER FATIGUE: ICD-10-CM

## 2025-06-03 DIAGNOSIS — Z74.09 IMPAIRED FUNCTIONAL MOBILITY, BALANCE, GAIT, AND ENDURANCE: ICD-10-CM

## 2025-06-03 DIAGNOSIS — R06.02 SHORTNESS OF BREATH: ICD-10-CM

## 2025-06-03 DIAGNOSIS — E55.9 VITAMIN D DEFICIENCY: ICD-10-CM

## 2025-06-03 DIAGNOSIS — L60.2 INCREASED THICKNESS OF NAIL: Primary | ICD-10-CM

## 2025-06-03 DIAGNOSIS — E78.2 MIXED HYPERLIPIDEMIA: ICD-10-CM

## 2025-06-03 DIAGNOSIS — Z12.4 SCREENING FOR MALIGNANT NEOPLASM OF THE CERVIX: ICD-10-CM

## 2025-06-03 LAB
BILIRUB BLD-MCNC: NEGATIVE MG/DL
CLARITY, POC: CLEAR
COLOR UR: YELLOW
GLUCOSE UR STRIP-MCNC: NEGATIVE MG/DL
KETONES UR QL: NEGATIVE
LEUKOCYTE EST, POC: ABNORMAL
NITRITE UR-MCNC: NEGATIVE MG/ML
PH UR: 6.5 [PH] (ref 5–8)
PROT UR STRIP-MCNC: NEGATIVE MG/DL
RBC # UR STRIP: NEGATIVE /UL
SP GR UR: 1.01 (ref 1–1.03)
UROBILINOGEN UR QL: NORMAL

## 2025-06-03 NOTE — PROGRESS NOTES
Subjective   The ABCs of the Annual Wellness Visit  Medicare Wellness Visit      Hailey Dickerson is a 84 y.o. patient who presents for a Medicare Wellness Visit.    The following portions of the patient's history were reviewed and   updated as appropriate: allergies, current medications, past family history, past medical history, past social history, past surgical history, and problem list.    Compared to one year ago, the patient's physical   health is the same.  Compared to one year ago, the patient's mental   health is the same.    Recent Hospitalizations:  She was not admitted to the hospital during the last year.     Current Medical Providers:  Patient Care Team:  Titi Cotton MD as PCP - General (Family Medicine)    Outpatient Medications Prior to Visit   Medication Sig Dispense Refill    aspirin 81 MG EC tablet Take 1 tablet by mouth Daily. 30 tablet 0    butalbital-aspirin-caffeine (FIORINAL) -40 MG per capsule Take 1 capsule by mouth Every 6 (Six) Hours As Needed for Headache. 25 capsule 0    cetirizine (zyrTEC) 10 MG tablet Take 1 tablet by mouth once daily 90 tablet 3    chlorzoxazone (PARAFON FORTE) 250 MG tablet Take 1 tablet by mouth 4 (Four) Times a Day As Needed for Muscle Spasms. 40 tablet 2    Cholecalciferol (Vitamin D) 50 MCG (2000 UT) capsule Take  by mouth Daily.      citalopram (CeleXA) 20 MG tablet Take 1 tablet by mouth once daily 90 tablet 3    Cranberry 125 MG tablet Take 1 tablet by mouth Daily.      famotidine (PEPCID) 20 MG tablet TAKE 2 TABLETS BY MOUTH AT BEDTIME 180 tablet 3    FIBER PO Take  by mouth Daily.      fluticasone (FLONASE) 50 MCG/ACT nasal spray Administer 2 sprays into the nostril(s) as directed by provider Daily. 16 g 0    indapamide (LOZOL) 2.5 MG tablet Take 1 tablet by mouth once daily 90 tablet 2    lisinopril (PRINIVIL,ZESTRIL) 10 MG tablet Take 1 tablet by mouth once daily 90 tablet 0    Multiple Vitamins-Minerals (MULTIVITAMIN WITH MINERALS)  "tablet tablet Take 1 tablet by mouth Daily.      Probiotic Product (PROBIOTIC-10 PO) Take  by mouth Daily.      simvastatin (ZOCOR) 40 MG tablet TAKE 1/2 (ONE-HALF) TABLET BY MOUTH IN THE EVENING 45 tablet 3    doxycycline (ADOXA) 100 MG tablet Take 1 tablet by mouth 2 (Two) Times a Day. 20 tablet 0     No facility-administered medications prior to visit.     No opioid medication identified on active medication list. I have reviewed chart for other potential  high risk medication/s and harmful drug interactions in the elderly.      Aspirin is on active medication list. Aspirin use is indicated based on review of current medical condition/s. Pros and cons of this therapy have been discussed today. Benefits of this medication outweigh potential harm.  Patient has been encouraged to continue taking this medication.  .      Patient Active Problem List   Diagnosis    Essential hypertension    Anxiety    GERD (gastroesophageal reflux disease)    Primary osteoarthritis of left knee    Osteoarthritis of left knee    Mixed hyperlipidemia    Morbidly obese     Advance Care Planning Advance Directive is not on file.  ACP discussion was held with the patient during this visit. Patient does not have an advance directive, declines further assistance.            Objective   Vitals:    06/03/25 1241   BP: 114/70   BP Location: Left arm   Patient Position: Sitting   Cuff Size: Large Adult   Pulse: 97   Resp: 18   Temp: 98.5 °F (36.9 °C)   TempSrc: Temporal   SpO2: 99%   Weight: 97.1 kg (214 lb)   Height: 160 cm (63\")   PainSc: 0-No pain       Estimated body mass index is 37.91 kg/m² as calculated from the following:    Height as of this encounter: 160 cm (63\").    Weight as of this encounter: 97.1 kg (214 lb).                Does the patient have evidence of cognitive impairment? No                                                                                                Health  Risk Assessment    Smoking Status:  Social " History     Tobacco Use   Smoking Status Never   Smokeless Tobacco Never     Alcohol Consumption:  Social History     Substance and Sexual Activity   Alcohol Use No       Fall Risk Screen  STEADI Fall Risk Assessment was completed, and patient is at MODERATE risk for falls. Assessment completed on:6/3/2025    Depression Screening   Little interest or pleasure in doing things? Not at all   Feeling down, depressed, or hopeless? Not at all   PHQ-2 Total Score 0      Health Habits and Functional and Cognitive Screenin/3/2025    12:44 PM   Functional & Cognitive Status   Do you have difficulty preparing food and eating? No   Do you have difficulty bathing yourself, getting dressed or grooming yourself? No   Do you have difficulty using the toilet? No   Do you have difficulty moving around from place to place? No   Do you have trouble with steps or getting out of a bed or a chair? Yes   Current Diet Well Balanced Diet   Dental Exam Up to date   Eye Exam Up to date   Exercise (times per week) 0 times per week   Current Exercises Include No Regular Exercise   Do you need help using the phone?  No   Are you deaf or do you have serious difficulty hearing?  No   Do you need help to go to places out of walking distance? No   Do you need help shopping? No   Do you need help preparing meals?  No   Do you need help with housework?  No   Do you need help with laundry? No   Do you need help taking your medications? No   Do you need help managing money? No   Do you ever drive or ride in a car without wearing a seat belt? No   Have you felt unusual stress, anger or loneliness in the last month? No   Who do you live with? Spouse   If you need help, do you have trouble finding someone available to you? No   Have you been bothered in the last four weeks by sexual problems? No   Do you have difficulty concentrating, remembering or making decisions? No           Age-appropriate Screening Schedule:  Refer to the list below for  future screening recommendations based on patient's age, sex and/or medical conditions. Orders for these recommended tests are listed in the plan section. The patient has been provided with a written plan.    Health Maintenance List  Health Maintenance   Topic Date Due    TDAP/TD VACCINES (2 - Td or Tdap) 12/18/2022    COVID-19 Vaccine (9 - 2024-25 season) 06/04/2025    COLORECTAL CANCER SCREENING  06/13/2025    INFLUENZA VACCINE  07/01/2025    LIPID PANEL  12/04/2025    ANNUAL WELLNESS VISIT  06/03/2026    DXA SCAN  11/04/2026    RSV Vaccine - Adults  Completed    Pneumococcal Vaccine 50+  Completed    ZOSTER VACCINE  Discontinued                                                                                                                                                CMS Preventative Services Quick Reference  Risk Factors Identified During Encounter  Fall Risk-High or Moderate: Information on Fall Prevention Shared in After Visit Summary    The above risks/problems have been discussed with the patient.  Pertinent information has been shared with the patient in the After Visit Summary.  An After Visit Summary and PPPS were made available to the patient.    Follow Up:   Next Medicare Wellness visit to be scheduled in 1 year.         Additional E&M Note during same encounter follows:  Patient has additional, significant, and separately identifiable condition(s)/problem(s) that require work above and beyond the Medicare Wellness Visit     Chief Complaint  Medicare Wellness-subsequent (Declined offer for Paps anymore per patient)    Subjective   84-year-old female for annual Medicare wellness exam      Hailey is also being seen today for an annual adult preventative physical exam.     Review of Systems   Respiratory:  Negative for shortness of breath.    Cardiovascular:  Negative for chest pain and leg swelling.        Saw cardiology last week-no recommendations   Gastrointestinal:         Cologuard good to the end  "of June this year   Genitourinary:         Refuses Pap smear   Musculoskeletal:  Positive for arthralgias and neck pain.   All other systems reviewed and are negative.             Objective   Vital Signs:  /70 (BP Location: Left arm, Patient Position: Sitting, Cuff Size: Large Adult)   Pulse 97   Temp 98.5 °F (36.9 °C) (Temporal)   Resp 18   Ht 160 cm (63\")   Wt 97.1 kg (214 lb)   SpO2 99%   BMI 37.91 kg/m²   Physical Exam  Vitals and nursing note reviewed.   Constitutional:       Appearance: She is obese.   HENT:      Right Ear: Tympanic membrane and ear canal normal.      Left Ear: Tympanic membrane and ear canal normal.      Mouth/Throat:      Mouth: Mucous membranes are moist.   Eyes:      Extraocular Movements: Extraocular movements intact.      Pupils: Pupils are equal, round, and reactive to light.   Neck:      Vascular: No carotid bruit.   Cardiovascular:      Rate and Rhythm: Normal rate and regular rhythm.      Pulses: Normal pulses.      Heart sounds: Normal heart sounds.   Pulmonary:      Effort: Pulmonary effort is normal.      Breath sounds: Normal breath sounds.      Comments: Breast no masses noted  Abdominal:      Palpations: Abdomen is soft. There is no mass.   Genitourinary:     Comments:  Refuses Pap smear and pelvic  Musculoskeletal:      Right lower leg: No edema.      Left lower leg: No edema.   Lymphadenopathy:      Cervical: No cervical adenopathy.   Skin:     General: Skin is warm and dry.   Neurological:      General: No focal deficit present.      Mental Status: She is alert and oriented to person, place, and time.   Psychiatric:         Mood and Affect: Mood normal.         Behavior: Behavior normal.         Thought Content: Thought content normal.         Judgment: Judgment normal.         The following data was reviewed by: Titi Cotton MD on 06/03/2025:           Assessment and Plan      Essential hypertension      Orders:    POC Urinalysis Dipstick, " Multipro    Other fatigue    Orders:    TSH    T4, free    CBC & Differential    Mixed hyperlipidemia       Orders:    Comprehensive Metabolic Panel    Lipid Panel    Vitamin D deficiency    Orders:    Vitamin D,25-Hydroxy    Memory loss    Orders:    Vitamin B12    Folate    Screening for malignant neoplasm of the cervix         Screening mammogram for breast cancer    Orders:    Mammo Screening Digital Tomosynthesis Bilateral With CAD; Future    Medicare annual wellness visit, subsequent         Shortness of breath    Orders:    BNP    Pelvic pain    Orders:    US Pelvis Complete; Future  Plan above-cardiology evaluated last week          Follow Up   Return in about 6 months (around 12/3/2025), or if symptoms worsen or fail to improve.  Patient was given instructions and counseling regarding her condition or for health maintenance advice. Please see specific information pulled into the AVS if appropriate.

## 2025-06-03 NOTE — PATIENT INSTRUCTIONS
Advance Care Planning and Advance Directives     You make decisions on a daily basis - decisions about where you want to live, your career, your home, your life. Perhaps one of the most important decisions you face is your choice for future medical care. Take time to talk with your family and your healthcare team and start planning today.  Advance Care Planning is a process that can help you:  Understand possible future healthcare decisions in light of your own experiences  Reflect on those decision in light of your goals and values  Discuss your decisions with those closest to you and the healthcare professionals that care for you  Make a plan by creating a document that reflects your wishes    Surrogate Decision Maker  In the event of a medical emergency, which has left you unable to communicate or to make your own decisions, you would need someone to make decisions for you.  It is important to discuss your preferences for medical treatment with this person while you are in good health.     Qualities of a surrogate decision maker:  Willing to take on this role and responsibility  Knows what you want for future medical care  Willing to follow your wishes even if they don't agree with them  Able to make difficult medical decisions under stressful circumstances    Advance Directives  These are legal documents you can create that will guide your healthcare team and decision maker(s) when needed. These documents can be stored in the electronic medical record.    Living Will - a legal document to guide your care if you have a terminal condition or a serious illness and are unable to communicate. States vary by statute in document names/types, but most forms may include one or more of the following:        -  Directions regarding life-prolonging treatments        -  Directions regarding artificially provided nutrition/hydration        -  Choosing a healthcare decision maker        -  Direction regarding organ/tissue  donation    Durable Power of  for Healthcare - this document names an -in-fact to make medical decisions for you, but it may also allow this person to make personal and financial decisions for you. Please seek the advice of an  if you need this type of document.    **Advance Directives are not required and no one may discriminate against you if you do not sign one.    Medical Orders  Many states allow specific forms/orders signed by your physician to record your wishes for medical treatment in your current state of health. This form, signed in personal communication with your physician, addresses resuscitation and other medical interventions that you may or may not want.      For more information or to schedule a time with a [facility] Advance Care Planning Facilitator contact: [employer] or call [phone] and someone will contact you directly.    Medicare Wellness  Personal Prevention Plan of Service     Date of Office Visit:    Encounter Provider:  Titi Cotton MD  Place of Service:  [facility]  Patient Name: Hailey Dickerson  :  1940    As part of the Medicare Wellness portion of your visit today, we are providing you with this personalized preventive plan of services (PPPS). This plan is based upon recommendations of the [place] Preventive Services Task Force (USPSTF) and the Advisory Committee on Immunization Practices (ACIP).    This lists the preventive care services that should be considered, and provides dates of when you are due. Items listed as completed are up-to-date and do not require any further intervention.    Health Maintenance   Topic Date Due   • TDAP/TD VACCINES (2 - Td or Tdap) 2022   • ANNUAL WELLNESS VISIT  2025   • COVID-19 Vaccine ( season) 2025   • COLORECTAL CANCER SCREENING  2025   • INFLUENZA VACCINE  2025   • LIPID PANEL  2025   • DXA SCAN   11/04/2026   • RSV Vaccine - Adults  Completed   • Pneumococcal Vaccine 50+  Completed   • ZOSTER VACCINE  Discontinued       No orders of the defined types were placed in this encounter.      No follow-ups on file.

## 2025-06-03 NOTE — TELEPHONE ENCOUNTER
Upon checkout, pt mentioned needing referral to podiatry for toenail trimming. Ok for [facility].

## 2025-06-04 LAB
25(OH)D3+25(OH)D2 SERPL-MCNC: 72.5 NG/ML (ref 30–100)
ALBUMIN SERPL-MCNC: 4.5 G/DL (ref 3.5–5.2)
ALBUMIN/GLOB SERPL: 1.8 G/DL
ALP SERPL-CCNC: 71 U/L (ref 39–117)
ALT SERPL-CCNC: 9 U/L (ref 1–33)
AST SERPL-CCNC: 18 U/L (ref 1–32)
BASOPHILS # BLD AUTO: 0.06 10*3/MM3 (ref 0–0.2)
BASOPHILS NFR BLD AUTO: 0.7 % (ref 0–1.5)
BILIRUB SERPL-MCNC: 0.3 MG/DL (ref 0–1.2)
BNP SERPL-MCNC: 57.6 PG/ML (ref 0–100)
BUN SERPL-MCNC: 20 MG/DL (ref 8–23)
BUN/CREAT SERPL: 16.4 (ref 7–25)
CALCIUM SERPL-MCNC: 9.5 MG/DL (ref 8.6–10.5)
CHLORIDE SERPL-SCNC: 97 MMOL/L (ref 98–107)
CHOLEST SERPL-MCNC: 165 MG/DL (ref 0–200)
CO2 SERPL-SCNC: 24.4 MMOL/L (ref 22–29)
CREAT SERPL-MCNC: 1.22 MG/DL (ref 0.57–1)
EGFRCR SERPLBLD CKD-EPI 2021: 43.6 ML/MIN/1.73
EOSINOPHIL # BLD AUTO: 0.12 10*3/MM3 (ref 0–0.4)
EOSINOPHIL NFR BLD AUTO: 1.5 % (ref 0.3–6.2)
ERYTHROCYTE [DISTWIDTH] IN BLOOD BY AUTOMATED COUNT: 11.8 % (ref 12.3–15.4)
FOLATE SERPL-MCNC: >20 NG/ML (ref 4.78–24.2)
GLOBULIN SER CALC-MCNC: 2.5 GM/DL
GLUCOSE SERPL-MCNC: 94 MG/DL (ref 65–99)
HCT VFR BLD AUTO: 40.3 % (ref 34–46.6)
HDLC SERPL-MCNC: 46 MG/DL (ref 40–60)
HGB BLD-MCNC: 13.1 G/DL (ref 12–15.9)
IMM GRANULOCYTES # BLD AUTO: 0.03 10*3/MM3 (ref 0–0.05)
IMM GRANULOCYTES NFR BLD AUTO: 0.4 % (ref 0–0.5)
LDLC SERPL CALC-MCNC: 86 MG/DL (ref 0–100)
LYMPHOCYTES # BLD AUTO: 2.07 10*3/MM3 (ref 0.7–3.1)
LYMPHOCYTES NFR BLD AUTO: 25.6 % (ref 19.6–45.3)
MCH RBC QN AUTO: 32.8 PG (ref 26.6–33)
MCHC RBC AUTO-ENTMCNC: 32.5 G/DL (ref 31.5–35.7)
MCV RBC AUTO: 101 FL (ref 79–97)
MONOCYTES # BLD AUTO: 0.75 10*3/MM3 (ref 0.1–0.9)
MONOCYTES NFR BLD AUTO: 9.3 % (ref 5–12)
NEUTROPHILS # BLD AUTO: 5.07 10*3/MM3 (ref 1.7–7)
NEUTROPHILS NFR BLD AUTO: 62.5 % (ref 42.7–76)
NRBC BLD AUTO-RTO: 0 /100 WBC (ref 0–0.2)
PLATELET # BLD AUTO: 292 10*3/MM3 (ref 140–450)
POTASSIUM SERPL-SCNC: 4.6 MMOL/L (ref 3.5–5.2)
PROT SERPL-MCNC: 7 G/DL (ref 6–8.5)
RBC # BLD AUTO: 3.99 10*6/MM3 (ref 3.77–5.28)
SODIUM SERPL-SCNC: 137 MMOL/L (ref 136–145)
T4 FREE SERPL-MCNC: 1.11 NG/DL (ref 0.92–1.68)
TRIGL SERPL-MCNC: 194 MG/DL (ref 0–150)
TSH SERPL DL<=0.005 MIU/L-ACNC: 2.47 UIU/ML (ref 0.27–4.2)
VIT B12 SERPL-MCNC: 741 PG/ML (ref 211–946)
VLDLC SERPL CALC-MCNC: 33 MG/DL (ref 5–40)
WBC # BLD AUTO: 8.1 10*3/MM3 (ref 3.4–10.8)

## 2025-07-07 RX ORDER — CITALOPRAM HYDROBROMIDE 20 MG/1
20 TABLET ORAL DAILY
Qty: 90 TABLET | Refills: 0 | Status: SHIPPED | OUTPATIENT
Start: 2025-07-07

## 2025-07-07 NOTE — TELEPHONE ENCOUNTER
Rx Refill Note  Requested Prescriptions     Pending Prescriptions Disp Refills    citalopram (CeleXA) 20 MG tablet [Pharmacy Med Name: Citalopram Hydrobromide 20 MG Oral Tablet] 90 tablet 0     Sig: Take 1 tablet by mouth once daily      Last office visit with prescribing clinician: 6/3/2025   Last telemedicine visit with prescribing clinician: Visit date not found   Next office visit with prescribing clinician: 12/10/2025                         Would you like a call back once the refill request has been completed: [] Yes [] No    If the office needs to give you a call back, can they leave a voicemail: [] Yes [] No    La Nena Avalos MA  07/07/25, 15:26 CDT

## 2025-07-22 DIAGNOSIS — I10 ESSENTIAL HYPERTENSION: ICD-10-CM

## 2025-07-22 RX ORDER — INDAPAMIDE 2.5 MG/1
2.5 TABLET ORAL DAILY
Qty: 90 TABLET | Refills: 3 | Status: SHIPPED | OUTPATIENT
Start: 2025-07-22

## 2025-07-22 NOTE — TELEPHONE ENCOUNTER
Rx Refill Note  Requested Prescriptions     Pending Prescriptions Disp Refills    indapamide (LOZOL) 2.5 MG tablet [Pharmacy Med Name: Indapamide 2.5 MG Oral Tablet] 90 tablet 0     Sig: Take 1 tablet by mouth once daily      Last office visit with prescribing clinician: 6/3/25  Last telemedicine visit with prescribing clinician: Visit date not found   Next office visit with prescribing clinician: 12/10/25    {TIP  Please add Last Relevant Lab 6/3/25    La Nena Ha MA  07/22/25, 07:36 CDT

## 2025-08-14 DIAGNOSIS — I10 ESSENTIAL HYPERTENSION: ICD-10-CM

## 2025-08-14 RX ORDER — LISINOPRIL 10 MG/1
10 TABLET ORAL DAILY
Qty: 90 TABLET | Refills: 3 | Status: SHIPPED | OUTPATIENT
Start: 2025-08-14

## 2025-08-23 DIAGNOSIS — E78.2 MIXED HYPERLIPIDEMIA: ICD-10-CM

## 2025-08-25 RX ORDER — SIMVASTATIN 40 MG
20 TABLET ORAL EVERY EVENING
Qty: 45 TABLET | Refills: 0 | Status: SHIPPED | OUTPATIENT
Start: 2025-08-25

## (undated) DEVICE — CLTH CLENS READYCLEANSE PERI CARE PK/5

## (undated) DEVICE — SHORT LENS-STERILE

## (undated) DEVICE — 4-PORT MANIFOLD: Brand: NEPTUNE 2

## (undated) DEVICE — TRY PREP SCRB VAG PVP

## (undated) DEVICE — SUT GUT CHRM 2/0 CT1 36IN 923H

## (undated) DEVICE — DUAL CUT SAGITTAL BLADE

## (undated) DEVICE — FOAM BUMP ROUND LARGE: Brand: MEDLINE INDUSTRIES, INC.

## (undated) DEVICE — DRSNG SURESITE WNDW 4X4.5

## (undated) DEVICE — RECIPROCATING BLADE HEAVY DUTY LONG, OFFSET  (77.6 X 0.77 X 11.2MM)

## (undated) DEVICE — KT DRN EVAC WND PVC PCH WTROC RND 10F400

## (undated) DEVICE — GLV SURG TRIUMPH PF LTX 7.5 STRL

## (undated) DEVICE — CVR BRD ARM 13X30

## (undated) DEVICE — PK TURNOVER RM ADV

## (undated) DEVICE — RECIPROCATING BLADE, DOUBLE SIDED, OFFSET  (70.0 X 1.0 X 12.5MM)

## (undated) DEVICE — PENCL E/S PLUMEPEN HLSTR 3/8IN 10FT CA/5

## (undated) DEVICE — DRSNG WND GZ CURAD OIL EMULSION 3X8IN STRL PK/3

## (undated) DEVICE — PK KN TOTL 30

## (undated) DEVICE — CONCISE CEMENT SCULPS KIT: Brand: CONCISE

## (undated) DEVICE — ANTIBACTERIAL UNDYED BRAIDED (POLYGLACTIN 910), SYNTHETIC ABSORBABLE SUTURE: Brand: COATED VICRYL

## (undated) DEVICE — BIPOLAR SEALER 23-112-1 AQM 6.0: Brand: AQUAMANTYS™

## (undated) DEVICE — DISPOSABLE TOURNIQUET CUFF SINGLE BLADDER, SINGLE PORT AND QUICK CONNECT CONNECTOR: Brand: COLOR CUFF

## (undated) DEVICE — COTTON UNDERCAST PADDING,REGULAR FINISH: Brand: WEBRIL

## (undated) DEVICE — SUT ETHLN 2/0 FSLX 30IN 1674H

## (undated) DEVICE — SUT ETHLN 3/0 FSLX 30IN 1673H

## (undated) DEVICE — PCH SURG STRL INST SLF/SEAL 3.5X9IN

## (undated) DEVICE — CEMENT MIXING SYSTEM WITH FEMORAL BREAKWAY NOZZLE: Brand: REVOLUTION

## (undated) DEVICE — GLV SURG SENSICARE PI LF PF 8 GRN STRL